# Patient Record
Sex: FEMALE | Race: BLACK OR AFRICAN AMERICAN | Employment: PART TIME | URBAN - METROPOLITAN AREA
[De-identification: names, ages, dates, MRNs, and addresses within clinical notes are randomized per-mention and may not be internally consistent; named-entity substitution may affect disease eponyms.]

---

## 2017-07-04 ENCOUNTER — HOSPITAL ENCOUNTER (EMERGENCY)
Age: 22
Discharge: HOME | End: 2017-07-04
Attending: EMERGENCY MEDICINE | Admitting: EMERGENCY MEDICINE
Payer: SELF-PAY

## 2017-07-04 VITALS
WEIGHT: 102 LBS | DIASTOLIC BLOOD PRESSURE: 75 MMHG | TEMPERATURE: 98.9 F | OXYGEN SATURATION: 98 % | SYSTOLIC BLOOD PRESSURE: 107 MMHG | BODY MASS INDEX: 18.07 KG/M2 | RESPIRATION RATE: 16 BRPM | HEART RATE: 95 BPM | HEIGHT: 63 IN

## 2017-07-04 DIAGNOSIS — A59.9 TRICHIMONIASIS: ICD-10-CM

## 2017-07-04 DIAGNOSIS — B96.89 BV (BACTERIAL VAGINOSIS): ICD-10-CM

## 2017-07-04 DIAGNOSIS — N76.0 BV (BACTERIAL VAGINOSIS): ICD-10-CM

## 2017-07-04 DIAGNOSIS — N30.00 ACUTE CYSTITIS WITHOUT HEMATURIA: ICD-10-CM

## 2017-07-04 DIAGNOSIS — K29.70 GASTRITIS WITHOUT BLEEDING, UNSPECIFIED CHRONICITY, UNSPECIFIED GASTRITIS TYPE: Primary | ICD-10-CM

## 2017-07-04 LAB
ALBUMIN SERPL BCP-MCNC: 3.7 G/DL (ref 3.5–5)
ALBUMIN/GLOB SERPL: 0.9 {RATIO} (ref 1.1–2.2)
ALP SERPL-CCNC: 85 U/L (ref 45–117)
ALT SERPL-CCNC: 16 U/L (ref 12–78)
ANION GAP BLD CALC-SCNC: 10 MMOL/L (ref 5–15)
APPEARANCE UR: ABNORMAL
AST SERPL W P-5'-P-CCNC: 17 U/L (ref 15–37)
BACTERIA URNS QL MICRO: ABNORMAL /HPF
BASOPHILS # BLD AUTO: 0 K/UL (ref 0–0.1)
BASOPHILS # BLD: 0 % (ref 0–1)
BILIRUB SERPL-MCNC: 0.6 MG/DL (ref 0.2–1)
BILIRUB UR QL CFM: NEGATIVE
BUN SERPL-MCNC: 8 MG/DL (ref 6–20)
BUN/CREAT SERPL: 11 (ref 12–20)
CALCIUM SERPL-MCNC: 8.5 MG/DL (ref 8.5–10.1)
CHLORIDE SERPL-SCNC: 102 MMOL/L (ref 97–108)
CLUE CELLS VAG QL WET PREP: NORMAL
CO2 SERPL-SCNC: 28 MMOL/L (ref 21–32)
COLOR UR: ABNORMAL
CREAT SERPL-MCNC: 0.73 MG/DL (ref 0.55–1.02)
EOSINOPHIL # BLD: 0.1 K/UL (ref 0–0.4)
EOSINOPHIL NFR BLD: 1 % (ref 0–7)
EPITH CASTS URNS QL MICRO: ABNORMAL /LPF
ERYTHROCYTE [DISTWIDTH] IN BLOOD BY AUTOMATED COUNT: 12 % (ref 11.5–14.5)
GLOBULIN SER CALC-MCNC: 4.3 G/DL (ref 2–4)
GLUCOSE SERPL-MCNC: 82 MG/DL (ref 65–100)
GLUCOSE UR STRIP.AUTO-MCNC: NEGATIVE MG/DL
HCG UR QL: NEGATIVE
HCT VFR BLD AUTO: 37.4 % (ref 35–47)
HGB BLD-MCNC: 12.7 G/DL (ref 11.5–16)
HGB UR QL STRIP: ABNORMAL
KETONES UR QL STRIP.AUTO: NEGATIVE MG/DL
KOH PREP SPEC: NORMAL
LACTATE SERPL-SCNC: 1 MMOL/L (ref 0.4–2)
LEUKOCYTE ESTERASE UR QL STRIP.AUTO: ABNORMAL
LIPASE SERPL-CCNC: 68 U/L (ref 73–393)
LYMPHOCYTES # BLD AUTO: 14 % (ref 12–49)
LYMPHOCYTES # BLD: 1.2 K/UL (ref 0.8–3.5)
MCH RBC QN AUTO: 29.6 PG (ref 26–34)
MCHC RBC AUTO-ENTMCNC: 34 G/DL (ref 30–36.5)
MCV RBC AUTO: 87.2 FL (ref 80–99)
MONOCYTES # BLD: 0.8 K/UL (ref 0–1)
MONOCYTES NFR BLD AUTO: 10 % (ref 5–13)
NEUTS SEG # BLD: 6.4 K/UL (ref 1.8–8)
NEUTS SEG NFR BLD AUTO: 75 % (ref 32–75)
NITRITE UR QL STRIP.AUTO: NEGATIVE
PH UR STRIP: 6 [PH] (ref 5–8)
PLATELET # BLD AUTO: 205 K/UL (ref 150–400)
POTASSIUM SERPL-SCNC: 3.8 MMOL/L (ref 3.5–5.1)
PROT SERPL-MCNC: 8 G/DL (ref 6.4–8.2)
PROT UR STRIP-MCNC: 100 MG/DL
RBC # BLD AUTO: 4.29 M/UL (ref 3.8–5.2)
RBC #/AREA URNS HPF: ABNORMAL /HPF (ref 0–5)
SERVICE CMNT-IMP: NORMAL
SODIUM SERPL-SCNC: 140 MMOL/L (ref 136–145)
SP GR UR REFRACTOMETRY: 1.02 (ref 1–1.03)
T VAGINALIS VAG QL WET PREP: NORMAL
UA: UC IF INDICATED,UAUC: ABNORMAL
UROBILINOGEN UR QL STRIP.AUTO: 1 EU/DL (ref 0.2–1)
WBC # BLD AUTO: 8.5 K/UL (ref 3.6–11)
WBC URNS QL MICRO: ABNORMAL /HPF (ref 0–4)

## 2017-07-04 PROCEDURE — 74011250637 HC RX REV CODE- 250/637: Performed by: PHYSICIAN ASSISTANT

## 2017-07-04 PROCEDURE — 81025 URINE PREGNANCY TEST: CPT

## 2017-07-04 PROCEDURE — 85025 COMPLETE CBC W/AUTO DIFF WBC: CPT | Performed by: PHYSICIAN ASSISTANT

## 2017-07-04 PROCEDURE — 87210 SMEAR WET MOUNT SALINE/INK: CPT | Performed by: PHYSICIAN ASSISTANT

## 2017-07-04 PROCEDURE — 80053 COMPREHEN METABOLIC PANEL: CPT | Performed by: PHYSICIAN ASSISTANT

## 2017-07-04 PROCEDURE — 87491 CHLMYD TRACH DNA AMP PROBE: CPT | Performed by: PHYSICIAN ASSISTANT

## 2017-07-04 PROCEDURE — 83690 ASSAY OF LIPASE: CPT | Performed by: PHYSICIAN ASSISTANT

## 2017-07-04 PROCEDURE — 81001 URINALYSIS AUTO W/SCOPE: CPT | Performed by: EMERGENCY MEDICINE

## 2017-07-04 PROCEDURE — 83605 ASSAY OF LACTIC ACID: CPT | Performed by: PHYSICIAN ASSISTANT

## 2017-07-04 PROCEDURE — 87147 CULTURE TYPE IMMUNOLOGIC: CPT | Performed by: EMERGENCY MEDICINE

## 2017-07-04 PROCEDURE — 87086 URINE CULTURE/COLONY COUNT: CPT | Performed by: EMERGENCY MEDICINE

## 2017-07-04 PROCEDURE — 96360 HYDRATION IV INFUSION INIT: CPT

## 2017-07-04 PROCEDURE — 99283 EMERGENCY DEPT VISIT LOW MDM: CPT

## 2017-07-04 PROCEDURE — 74011250636 HC RX REV CODE- 250/636: Performed by: PHYSICIAN ASSISTANT

## 2017-07-04 PROCEDURE — 36415 COLL VENOUS BLD VENIPUNCTURE: CPT | Performed by: PHYSICIAN ASSISTANT

## 2017-07-04 RX ORDER — METRONIDAZOLE 500 MG/1
500 TABLET ORAL 2 TIMES DAILY
Qty: 1414 TAB | Refills: 0 | Status: SHIPPED | OUTPATIENT
Start: 2017-07-04 | End: 2017-07-11

## 2017-07-04 RX ORDER — NITROFURANTOIN 25; 75 MG/1; MG/1
100 CAPSULE ORAL 2 TIMES DAILY
Qty: 10 CAP | Refills: 0 | Status: SHIPPED | OUTPATIENT
Start: 2017-07-04 | End: 2017-07-09

## 2017-07-04 RX ORDER — SODIUM CHLORIDE 0.9 % (FLUSH) 0.9 %
5-10 SYRINGE (ML) INJECTION AS NEEDED
Status: DISCONTINUED | OUTPATIENT
Start: 2017-07-04 | End: 2017-07-04 | Stop reason: HOSPADM

## 2017-07-04 RX ORDER — AZITHROMYCIN 250 MG/1
2000 TABLET, FILM COATED ORAL
Status: COMPLETED | OUTPATIENT
Start: 2017-07-04 | End: 2017-07-04

## 2017-07-04 RX ORDER — SODIUM CHLORIDE 0.9 % (FLUSH) 0.9 %
5-10 SYRINGE (ML) INJECTION EVERY 8 HOURS
Status: DISCONTINUED | OUTPATIENT
Start: 2017-07-04 | End: 2017-07-04 | Stop reason: HOSPADM

## 2017-07-04 RX ADMIN — AZITHROMYCIN 2000 MG: 250 TABLET, FILM COATED ORAL at 19:06

## 2017-07-04 RX ADMIN — SODIUM CHLORIDE 1000 ML: 900 INJECTION, SOLUTION INTRAVENOUS at 19:09

## 2017-07-04 NOTE — DISCHARGE INSTRUCTIONS
Gastritis: Care Instructions  Your Care Instructions    Gastritis is a sore and upset stomach. It happens when something irritates the stomach lining. Many things can cause it. These include an infection such as the flu or something you ate or drank. Medicines or a sore on the lining of the stomach (ulcer) also can cause it. Your belly may bloat and ache. You may belch, vomit, and feel sick to your stomach. You should be able to relieve the problem by taking medicine. And it may help to change your diet. If gastritis lasts, your doctor may prescribe medicine. Follow-up care is a key part of your treatment and safety. Be sure to make and go to all appointments, and call your doctor if you are having problems. It's also a good idea to know your test results and keep a list of the medicines you take. How can you care for yourself at home? · If your doctor prescribed antibiotics, take them as directed. Do not stop taking them just because you feel better. You need to take the full course of antibiotics. · Be safe with medicines. If your doctor prescribed medicine to decrease stomach acid, take it as directed. Call your doctor if you think you are having a problem with your medicine. · Do not take any other medicine, including over-the-counter pain relievers, without talking to your doctor first.  · If your doctor recommends over-the-counter medicine to reduce stomach acid, such as Pepcid AC, Prilosec, Tagamet HB, or Zantac 75, follow the directions on the label. · Drink plenty of fluids (enough so that your urine is light yellow or clear like water) to prevent dehydration. Choose water and other caffeine-free clear liquids. If you have kidney, heart, or liver disease and have to limit fluids, talk with your doctor before you increase the amount of fluids you drink. · Limit how much alcohol you drink. · Avoid coffee, tea, cola drinks, chocolate, and other foods with caffeine.  They increase stomach acid.  When should you call for help? Call 911 anytime you think you may need emergency care. For example, call if:  · You vomit blood or what looks like coffee grounds. · You pass maroon or very bloody stools. Call your doctor now or seek immediate medical care if:  · You start breathing fast and have not produced urine in the last 8 hours. · You cannot keep fluids down. Watch closely for changes in your health, and be sure to contact your doctor if:  · You do not get better as expected. Where can you learn more? Go to http://sandy-asmita.info/. Enter [id number] in the search box to learn more about \"Gastritis: Care Instructions. \"  Current as of: August 9, 2016  Content Version: 11.3  © 2999-5653 [employer]. Care instructions adapted under license by [employer] (which disclaims liability or warranty for this information). If you have questions about a medical condition or this instruction, always ask your healthcare professional. [place] [address] any warranty or liability for your use of this information. Bacterial Vaginosis: Care Instructions  Your Care Instructions    Bacterial vaginosis is a type of vaginal infection. It is caused by excess growth of certain bacteria that are normally found in the vagina. Symptoms can include itching, swelling, pain when you urinate or have sex, and a gray or yellow discharge with a \"fishy\" odor. It is not considered an infection that is spread through sexual contact. Although symptoms can be annoying and uncomfortable, bacterial vaginosis does not usually cause other health problems. However, if you have it while you are pregnant, it can cause complications. While the infection may go away on its own, most doctors use antibiotics to treat it. You may have been prescribed pills or vaginal cream. With treatment, bacterial vaginosis usually clears up in 5 to 7 days.   Follow-up care is a key part of your treatment and safety. Be sure to make and go to all appointments, and call your doctor if you are having problems. It's also a good idea to know your test results and keep a list of the medicines you take. How can you care for yourself at home? · Take your antibiotics as directed. Do not stop taking them just because you feel better. You need to take the full course of antibiotics. · Do not eat or drink anything that contains alcohol if you are taking metronidazole (Flagyl). · Keep using your medicine if you start your period. Use pads instead of tampons while using a vaginal cream or suppository. Tampons can absorb the medicine. · Wear loose cotton clothing. Do not wear nylon and other materials that hold body heat and moisture close to the skin. · Do not scratch. Relieve itching with a cold pack or a cool bath. · Do not wash your vaginal area more than once a day. Use plain water or a mild, unscented soap. Do not douche. When should you call for help? Watch closely for changes in your health, and be sure to contact your doctor if:  · You have unexpected vaginal bleeding. · You have a fever. · You have new or increased pain in your vagina or pelvis. · You are not getting better after 1 week. · Your symptoms return after you finish the course of your medicine. Where can you learn more? Go to http://sandy-asmita.info/. Tammi Brittle in the search box to learn more about \"Bacterial Vaginosis: Care Instructions. \"  Current as of: October 13, 2016  Content Version: 11.3  © 0105-3322 [employer]. Care instructions adapted under license by [employer] (which disclaims liability or warranty for this information). If you have questions about a medical condition or this instruction, always ask your healthcare professional. [place] [address] any warranty or liability for your use of this information.          Trichomoniasis: Care Instructions  Your Care Instructions  Trichomoniasis is a sexually transmitted infection (STI) that is spread by having sex with an infected partner. Trichomoniasis is commonly called trich (say \"trick\"). In women, trich may cause vaginal itching and a smelly discharge. But in many cases, especially in men, there are no symptoms. [place] is treated so that you do not spread it to others. Both you and your sex partner or partners should be treated at the same time so you do not infect each other again. Trich may cause problems with pregnancy. Your doctor will talk with you about treatment for Trich if you are pregnant. Follow-up care is a key part of your treatment and safety. Be sure to make and go to all appointments, and call your doctor if you are having problems. Its also a good idea to know your test results and keep a list of the medicines you take. How can you care for yourself at home? · Take your antibiotics as directed. Do not stop taking them just because you feel better. You need to take the full course of antibiotics. · Do not have sex while you are being treated. If your doctor gave you a single dose of antibiotics, do not have sex for one week after being treated and until your partner also has been treated. · Tell your sex partner (or partners) that he or she will also need to be tested and treated. · Use a cold water compress or cool baths to relieve itching. To prevent trichomoniasis in the future  · Use latex condoms every time you have sex. Use them from the beginning to the end of sexual contact. · Talk to your partner before having sex. Find out if he or she has or is at risk for trich or any other STI. Keep in mind that a person may be able to spread an STI even if he or she does not have symptoms. · Do not have sex if you are being treated for trich or any other STI. · Do not have sex with anyone who has symptoms of an STI, such as sores on the genitals or mouth.   · Having one sex partner (who does not have STIs and does not have sex with anyone else) is a good way to avoid STIs. When should you call for help? Call your doctor now or seek immediate medical care if:  · You have unusual vaginal bleeding. · You have a fever. · You have new discharge from the vagina or penis. · You have pelvic pain. Watch closely for changes in your health, and be sure to contact your doctor if:  · You do not get better as expected. · You have any new symptoms or your symptoms get worse. Where can you learn more? Go to http://sandy-asmita.info/. Enter V077 in the search box to learn more about \"Trichomoniasis: Care Instructions. \"  Current as of: March 20, 2017  Content Version: 11.3  © 6755-0984 [employer]. Care instructions adapted under license by [employer] (which disclaims liability or warranty for this information). If you have questions about a medical condition or this instruction, always ask your healthcare professional. [address] any warranty or liability for your use of this information.

## 2017-07-04 NOTE — ED PROVIDER NOTES
HPI Comments: Pt reports sharp epigastric pain that began today after eaing chicken. Pt reports others ate food. No one had same reaction. Reports nausea, no emesis. Reports BM shortly after pain. Denies blood or loose stool. Reports decreased appetite today. Denies fever/chills, urinary sx, change in vaginal discharge. Pt admits to unprotected intercourse recently, and she is concerned for STDs. Would like to be tested and treated. Patient is a 25 y.o. female presenting with abdominal pain. The history is provided by the patient. Abdominal Pain    This is a new problem. The pain is located in the epigastric region. Associated symptoms include anorexia, flatus and nausea. Pertinent negatives include no fever, no belching, no diarrhea, no hematochezia, no melena, no vomiting, no constipation, no dysuria, no frequency, no hematuria, no headaches, no arthralgias, no myalgias, no trauma, no chest pain and no back pain. Nothing worsens the pain. The pain is relieved by nothing. Past Medical History:   Diagnosis Date    Asthma     Second hand smoke exposure     Seizure [facility])     mother unsure what kind of seizure, last one was in 1st grade       History reviewed. No pertinent surgical history. History reviewed. No pertinent family history. Social History     Social History    Marital status: SINGLE     Spouse name: N/A    Number of children: N/A    Years of education: N/A     Occupational History    Not on file. Social History Main Topics    Smoking status: Former Smoker     Packs/day: 0.25    Smokeless tobacco: Never Used    Alcohol use No    Drug use: No    Sexual activity: Yes     Birth control/ protection: Condom     Other Topics Concern    Not on file     Social History Narrative         ALLERGIES: Amoxicillin    Review of Systems   Constitutional: Positive for appetite change. Negative for activity change, chills and fever.    Eyes: Negative for photophobia and visual disturbance. Respiratory: Positive for cough. Negative for chest tightness, wheezing and stridor. Cardiovascular: Negative for chest pain. Gastrointestinal: Positive for abdominal pain, anorexia, flatus and nausea. Negative for abdominal distention, blood in stool, constipation, diarrhea, hematochezia, melena, rectal pain and vomiting. Genitourinary: Negative for dysuria, flank pain, frequency and hematuria. Musculoskeletal: Negative for arthralgias, back pain and myalgias. Skin: Negative for color change, pallor, rash and wound. Neurological: Negative for dizziness, weakness, light-headedness and headaches. All other systems reviewed and are negative. Vitals:    07/04/17 1819   BP: 107/75   Pulse: 95   Resp: 16   Temp: 98.9 °F (37.2 °C)   SpO2: 98%   Weight: 46.3 kg (102 lb)   Height: 5' 3\" (1.6 m)            Physical Exam   Constitutional: She is oriented to person, place, and time. She appears well-developed and well-nourished. No distress. HENT:   Head: Normocephalic and atraumatic. Eyes: Conjunctivae are normal.   Cardiovascular: Normal rate, regular rhythm and normal heart sounds. Pulmonary/Chest: Effort normal and breath sounds normal. No respiratory distress. Abdominal: Soft. Bowel sounds are normal. She exhibits no distension. Genitourinary: No labial fusion. There is no rash, tenderness, lesion or injury on the right labia. There is no rash, tenderness, lesion or injury on the left labia. Uterus is not tender. Cervix exhibits discharge. Cervix exhibits no motion tenderness and no friability. Right adnexum displays no tenderness. Left adnexum displays no tenderness. No erythema, tenderness or bleeding in the vagina. No foreign body in the vagina. No signs of injury around the vagina. Vaginal discharge (frothy white discharge, no odor) found. Genitourinary Comments: Cervical os closed   Musculoskeletal: Normal range of motion.    Neurological: She is alert and oriented to person, place, and time. Skin: Skin is warm. No rash noted. No erythema. Psychiatric: She has a normal mood and affect. Her behavior is normal.   Nursing note and vitals reviewed. MDM  Number of Diagnoses or Management Options  Acute cystitis without hematuria:   BV (bacterial vaginosis):   Gastritis without bleeding, unspecified chronicity, unspecified gastritis type:   Trichimoniasis:   Diagnosis management comments: DDx: UTI, Trich, BV, Yeast, Gonorrhea, Chlamydia, Gastritis, Gastroenteritis, Pancreatitis, Viral Illness, Food Borne Illness       Amount and/or Complexity of Data Reviewed  Clinical lab tests: ordered and reviewed  Tests in the radiology section of CPT®: ordered and reviewed      ED Course       Procedures           Discussed results with pt. Discussed that partner needs to be treated. All questions answered. Pt voiced she understood. Pt reports improvement of epigastric pain after fluids.          LABORATORY TESTS:  Recent Results (from the past 12 hour(s))   URINALYSIS W/ REFLEX CULTURE    Collection Time: 07/04/17  6:37 PM   Result Value Ref Range    Color DARK YELLOW      Appearance HAZY (A) CLEAR      Specific gravity 1.025 1.003 - 1.030      pH (UA) 6.0 5.0 - 8.0      Protein 100 (A) NEG mg/dL    Glucose NEGATIVE  NEG mg/dL    Ketone NEGATIVE  NEG mg/dL    Blood TRACE (A) NEG      Urobilinogen 1.0 0.2 - 1.0 EU/dL    Nitrites NEGATIVE  NEG      Leukocyte Esterase SMALL (A) NEG      Bilirubin UA, confirm NEGATIVE  NEG      WBC 5-10 0 - 4 /hpf    RBC 5-10 0 - 5 /hpf    Epithelial cells MODERATE (A) FEW /lpf    Bacteria 2+ (A) NEG /hpf    UA:UC IF INDICATED URINE CULTURE ORDERED (A) CNI     HCG URINE, QL. - POC    Collection Time: 07/04/17  6:38 PM   Result Value Ref Range    Pregnancy test,urine (POC) NEGATIVE  NEG     CBC WITH AUTOMATED DIFF    Collection Time: 07/04/17  6:53 PM   Result Value Ref Range    WBC 8.5 3.6 - 11.0 K/uL    RBC 4.29 3.80 - 5.20 M/uL    HGB 12.7 11.5 - 16.0 g/dL HCT 37.4 35.0 - 47.0 %    MCV 87.2 80.0 - 99.0 FL    MCH 29.6 26.0 - 34.0 PG    MCHC 34.0 30.0 - 36.5 g/dL    RDW 12.0 11.5 - 14.5 %    PLATELET 346 825 - 919 K/uL    NEUTROPHILS 75 32 - 75 %    LYMPHOCYTES 14 12 - 49 %    MONOCYTES 10 5 - 13 %    EOSINOPHILS 1 0 - 7 %    BASOPHILS 0 0 - 1 %    ABS. NEUTROPHILS 6.4 1.8 - 8.0 K/UL    ABS. LYMPHOCYTES 1.2 0.8 - 3.5 K/UL    ABS. MONOCYTES 0.8 0.0 - 1.0 K/UL    ABS. EOSINOPHILS 0.1 0.0 - 0.4 K/UL    ABS. BASOPHILS 0.0 0.0 - 0.1 K/UL   METABOLIC PANEL, COMPREHENSIVE    Collection Time: 07/04/17  6:53 PM   Result Value Ref Range    Sodium 140 136 - 145 mmol/L    Potassium 3.8 3.5 - 5.1 mmol/L    Chloride 102 97 - 108 mmol/L    CO2 28 21 - 32 mmol/L    Anion gap 10 5 - 15 mmol/L    Glucose 82 65 - 100 mg/dL    BUN 8 6 - 20 MG/DL    Creatinine 0.73 0.55 - 1.02 MG/DL    BUN/Creatinine ratio 11 (L) 12 - 20      GFR est AA >60 >60 ml/min/1.73m2    GFR est non-AA >60 >60 ml/min/1.73m2    Calcium 8.5 8.5 - 10.1 MG/DL    Bilirubin, total 0.6 0.2 - 1.0 MG/DL    ALT (SGPT) 16 12 - 78 U/L    AST (SGOT) 17 15 - 37 U/L    Alk.  phosphatase 85 45 - 117 U/L    Protein, total 8.0 6.4 - 8.2 g/dL    Albumin 3.7 3.5 - 5.0 g/dL    Globulin 4.3 (H) 2.0 - 4.0 g/dL    A-G Ratio 0.9 (L) 1.1 - 2.2     LIPASE    Collection Time: 07/04/17  6:53 PM   Result Value Ref Range    Lipase 68 (L) 73 - 393 U/L   LACTIC ACID    Collection Time: 07/04/17  6:53 PM   Result Value Ref Range    Lactic acid 1.0 0.4 - 2.0 MMOL/L   RODY, OTHER SOURCES    Collection Time: 07/04/17  6:53 PM   Result Value Ref Range    Special Requests: NO SPECIAL REQUESTS      KOH NO YEAST SEEN     WET PREP    Collection Time: 07/04/17  6:53 PM   Result Value Ref Range    Clue cells CLUE CELLS PRESENT      Wet prep TRICHOMONAS PRESENT         IMAGING RESULTS:  No orders to display       MEDICATIONS GIVEN:  Medications   sodium chloride 0.9 % bolus infusion 1,000 mL (0 mL IntraVENous IV Completed 7/4/17 2007)   sodium chloride (NS) flush 5-10 mL (not administered)   sodium chloride (NS) flush 5-10 mL (not administered)   azithromycin (ZITHROMAX) tablet 2,000 mg (2,000 mg Oral Given 7/4/17 1902)       IMPRESSION:  1. Gastritis without bleeding, unspecified chronicity, unspecified gastritis type    2. Acute cystitis without hematuria    3. BV (bacterial vaginosis)    4. Trichimoniasis        PLAN:  1. Discharge Medication List as of 7/4/2017  7:48 PM      START taking these medications    Details   metroNIDAZOLE (FLAGYL) 500 mg tablet Take 1 Tab by mouth two (2) times a day for 7 days. , Normal, Disp-1414 Tab, R-0      nitrofurantoin, macrocrystal-monohydrate, (MACROBID) 100 mg capsule Take 1 Cap by mouth two (2) times a day for 5 days. , Normal, Disp-10 Cap, R-0           2.    Follow-up Information     Follow up With Details Comments 2001 for[MD] [facility],[address] [facility] Schedule an appointment as soon as possible for a visit in 2 days for PCP follow up [address] [facility] [address] [facility] [address]    Erwin Rucker NP Schedule an appointment as soon as possible for a visit in 1 day for gyn follow up [address] [facility]  [phone]          Return to ED if worse

## 2017-07-04 NOTE — LETTER
7/6/2017 Lee Gutiérrez [address] [id number] Dear Ms. Wenceslao Villanueva You were seen in the Emergency Department of [address] [place] [address] on 7/4/2017 and had lab and/or radiology tests performed. The Urine culture from your Emergency Department visit on 7/4/2017 was positive. If you have not improved or are worsening, please follow up with your primary care doctor or Emergency department as soon as possible. Your antibiotic may need to be changed. If you have any questions please contact the Emergency Department at [phone]. Sincerely, Laura Strauss PA-C 
 
[facility] EMERGENCY DEPT 
[address] [place] [phone] [phone]

## 2017-07-04 NOTE — ED TRIAGE NOTES
Intermittent sharp abd pain starting today with decreased appetite x years, denies n/v/diarrhea  Prod cough with yellow sputum and nasal congestion x 1 week or so

## 2017-07-05 LAB
C TRACH DNA SPEC QL NAA+PROBE: NEGATIVE
N GONORRHOEA DNA SPEC QL NAA+PROBE: NEGATIVE
SAMPLE TYPE: NORMAL
SERVICE CMNT-IMP: NORMAL
SPECIMEN SOURCE: NORMAL

## 2017-07-06 LAB
BACTERIA SPEC CULT: ABNORMAL
BACTERIA SPEC CULT: ABNORMAL
CC UR VC: ABNORMAL
SERVICE CMNT-IMP: ABNORMAL

## 2017-09-13 ENCOUNTER — HOSPITAL ENCOUNTER (EMERGENCY)
Age: 22
Discharge: HOME | End: 2017-09-13
Attending: EMERGENCY MEDICINE
Payer: SELF-PAY

## 2017-09-13 VITALS
HEART RATE: 76 BPM | DIASTOLIC BLOOD PRESSURE: 81 MMHG | SYSTOLIC BLOOD PRESSURE: 124 MMHG | OXYGEN SATURATION: 99 % | TEMPERATURE: 98.4 F | RESPIRATION RATE: 20 BRPM

## 2017-09-13 DIAGNOSIS — L03.116 CELLULITIS OF LEFT LOWER EXTREMITY: Primary | ICD-10-CM

## 2017-09-13 DIAGNOSIS — L02.91 ABSCESS: ICD-10-CM

## 2017-09-13 PROCEDURE — 74011250637 HC RX REV CODE- 250/637: Performed by: EMERGENCY MEDICINE

## 2017-09-13 PROCEDURE — 99282 EMERGENCY DEPT VISIT SF MDM: CPT

## 2017-09-13 RX ORDER — IBUPROFEN 400 MG/1
800 TABLET ORAL
Status: COMPLETED | OUTPATIENT
Start: 2017-09-13 | End: 2017-09-13

## 2017-09-13 RX ORDER — SULFAMETHOXAZOLE AND TRIMETHOPRIM 800; 160 MG/1; MG/1
2 TABLET ORAL 2 TIMES DAILY
Qty: 40 TAB | Refills: 0 | Status: SHIPPED | OUTPATIENT
Start: 2017-09-13 | End: 2017-09-23

## 2017-09-13 RX ORDER — CEPHALEXIN 250 MG/1
500 CAPSULE ORAL
Status: COMPLETED | OUTPATIENT
Start: 2017-09-13 | End: 2017-09-13

## 2017-09-13 RX ORDER — CEPHALEXIN 500 MG/1
500 CAPSULE ORAL 4 TIMES DAILY
Qty: 40 CAP | Refills: 0 | Status: SHIPPED | OUTPATIENT
Start: 2017-09-13 | End: 2017-09-23

## 2017-09-13 RX ORDER — SULFAMETHOXAZOLE AND TRIMETHOPRIM 800; 160 MG/1; MG/1
2 TABLET ORAL
Status: COMPLETED | OUTPATIENT
Start: 2017-09-13 | End: 2017-09-13

## 2017-09-13 RX ORDER — IBUPROFEN 800 MG/1
800 TABLET ORAL
Qty: 1 TAB | Refills: 0 | Status: SHIPPED | OUTPATIENT
Start: 2017-09-13 | End: 2018-04-20

## 2017-09-13 RX ADMIN — SULFAMETHOXAZOLE AND TRIMETHOPRIM 2 TABLET: 800; 160 TABLET ORAL at 01:00

## 2017-09-13 RX ADMIN — IBUPROFEN 800 MG: 400 TABLET, FILM COATED ORAL at 01:00

## 2017-09-13 RX ADMIN — CEPHALEXIN 500 MG: 250 CAPSULE ORAL at 01:00

## 2017-09-13 NOTE — ED NOTES
Discharge Instructions Reviewed with patient per this nurse. Discharge instructions given to patient per this nurse. Patient able to return verbalize discharge instructions. Paper copy of discharge instructions given. 3 RX given to patient per this ann. Patient condition stable, Respiratory status WNL, Neurostatus intact.  Ambultory out of er, to home with family

## 2017-09-13 NOTE — ED PROVIDER NOTES
HPI Comments: 20yo female presents with possible bug bite x several days which has become increasingly swollen and painful with an increasing area of surrounding erythema. Denies drainage, fever, injury to area or history of same. Patient is a 25 y.o. female presenting with skin problem. The history is provided by the patient. Skin Problem    This is a new problem. The current episode started more than 2 days ago. The problem has been gradually worsening. The problem is associated with an unknown factor. There has been no fever. The rash is present on the left upper leg. The pain is mild. The pain has been constant since onset. Treatments tried: warm compress. Past Medical History:   Diagnosis Date    Asthma     Second hand smoke exposure     Seizure [facility])     mother unsure what kind of seizure, last one was in 1st grade       No past surgical history on file. No family history on file. Social History     Social History    Marital status: SINGLE     Spouse name: N/A    Number of children: N/A    Years of education: N/A     Occupational History    Not on file. Social History Main Topics    Smoking status: Former Smoker     Packs/day: 0.25    Smokeless tobacco: Never Used    Alcohol use No    Drug use: No    Sexual activity: Yes     Birth control/ protection: Condom     Other Topics Concern    Not on file     Social History Narrative         ALLERGIES: Amoxicillin    Review of Systems   Constitutional: Negative. Negative for chills, fever and unexpected weight change. HENT: Negative. Negative for congestion and trouble swallowing. Eyes: Negative for discharge. Respiratory: Negative. Negative for cough, chest tightness and shortness of breath. Cardiovascular: Negative. Negative for chest pain. Gastrointestinal: Negative. Negative for abdominal distention, abdominal pain, constipation, diarrhea and nausea. Endocrine: Negative. Genitourinary: Negative.   Negative for difficulty urinating, dysuria, frequency and urgency. Musculoskeletal: Negative. Negative for arthralgias and myalgias. Skin: Positive for color change and rash. Wound: ?possible insect bit left proximal medial thigh. Allergic/Immunologic: Negative. Neurological: Negative. Negative for dizziness, speech difficulty and headaches. Hematological: Negative. Psychiatric/Behavioral: Negative. Negative for agitation and confusion. All other systems reviewed and are negative. Vitals:    09/13/17 0019   BP: 124/81   Pulse: 76   Resp: 20   Temp: 98.4 °F (36.9 °C)   SpO2: 99%            Physical Exam   Constitutional: She is oriented to person, place, and time. She appears well-developed and well-nourished. HENT:   Head: Normocephalic and atraumatic. Eyes: Conjunctivae and EOM are normal.   Neck: Neck supple. Cardiovascular: Normal rate, regular rhythm and intact distal pulses. Pulmonary/Chest: Effort normal. No respiratory distress. Abdominal: Soft. There is no tenderness. Musculoskeletal: Normal range of motion. She exhibits no deformity. Neurological: She is alert and oriented to person, place, and time. Skin: Skin is warm and dry. Abscess left inner thigh which is firm to palpation and exquisitely tender. No fluctuance, no active drainage. Surrounding erythema approximately 7cm in diameter. Psychiatric: She has a normal mood and affect. Her behavior is normal. Thought content normal.   Vitals reviewed. MDM  Number of Diagnoses or Management Options  Abscess:   Cellulitis of left lower extremity:     ED Course       Procedures    I recommended I&D, but patient was barely able tolerate palpation of area and had supreme anxiety about the procedure. She would like to try antibiotics, closely monitor the site, and return if she does not see significant improvement within several days.  Pt aware that infection could worsen or extend systemically without I&D but still refuses procedure.

## 2017-09-13 NOTE — DISCHARGE INSTRUCTIONS
Skin Abscess: Care Instructions  Your Care Instructions    A skin abscess is a bacterial infection that forms a pocket of pus. A boil is a kind of skin abscess. The doctor may have cut an opening in the abscess so that the pus can drain out. You may have gauze in the cut so that the abscess will stay open and keep draining. You may need antibiotics. You will need to follow up with your doctor to make sure the infection has gone away. The doctor has checked you carefully, but problems can develop later. If you notice any problems or new symptoms, get medical treatment right away. Follow-up care is a key part of your treatment and safety. Be sure to make and go to all appointments, and call your doctor if you are having problems. It's also a good idea to know your test results and keep a list of the medicines you take. How can you care for yourself at home? · Apply warm and dry compresses, a heating pad set on low, or a hot water bottle 3 or 4 times a day for pain. Keep a cloth between the heat source and your skin. · If your doctor prescribed antibiotics, take them as directed. Do not stop taking them just because you feel better. You need to take the full course of antibiotics. · Take pain medicines exactly as directed. ¨ If the doctor gave you a prescription medicine for pain, take it as prescribed. ¨ If you are not taking a prescription pain medicine, ask your doctor if you can take an over-the-counter medicine. · Keep your bandage clean and dry. Change the bandage whenever it gets wet or dirty, or at least one time a day. · If the abscess was packed with gauze:  ¨ Keep follow-up appointments to have the gauze changed or removed. If the doctor instructed you to remove the gauze, gently pull out all of the gauze when your doctor tells you to. ¨ After the gauze is removed, soak the area in warm water for 15 to 20 minutes 2 times a day, until the wound closes. When should you call for help?   Call your doctor now or seek immediate medical care if:  · You have signs of worsening infection, such as:  ¨ Increased pain, swelling, warmth, or redness. ¨ Red streaks leading from the infected skin. ¨ Pus draining from the wound. ¨ A fever. Watch closely for changes in your health, and be sure to contact your doctor if:  · You do not get better as expected. Where can you learn more? Go to http://sandy-asmita.info/. Enter Q721 in the search box to learn more about \"Skin Abscess: Care Instructions. \"  Current as of: October 13, 2016  Content Version: 11.3  © 5201-9918 [employer]. Care instructions adapted under license by [employer] (which disclaims liability or warranty for this information). If you have questions about a medical condition or this instruction, always ask your healthcare professional. [place] [address] any warranty or liability for your use of this information. Cellulitis: Care Instructions  Your Care Instructions    Cellulitis is a skin infection. It often occurs after a break in the skin from a scrape, cut, bite, or puncture, or after a rash. The doctor has checked you carefully, but problems can develop later. If you notice any problems or new symptoms, get medical treatment right away. Follow-up care is a key part of your treatment and safety. Be sure to make and go to all appointments, and call your doctor if you are having problems. It's also a good idea to know your test results and keep a list of the medicines you take. How can you care for yourself at home? · Take your antibiotics as directed. Do not stop taking them just because you feel better. You need to take the full course of antibiotics. · Prop up the infected area on pillows to reduce pain and swelling. Try to keep the area above the level of your heart as often as you can.   · If your doctor told you how to care for your wound, follow your doctor's instructions. If you did not get instructions, follow this general advice:  ¨ Wash the wound with clean water 2 times a day. Don't use hydrogen peroxide or alcohol, which can slow healing. ¨ You may cover the wound with a thin layer of petroleum jelly, such as Vaseline, and a nonstick bandage. ¨ Apply more petroleum jelly and replace the bandage as needed. · Be safe with medicines. Take pain medicines exactly as directed. ¨ If the doctor gave you a prescription medicine for pain, take it as prescribed. ¨ If you are not taking a prescription pain medicine, ask your doctor if you can take an over-the-counter medicine. To prevent cellulitis in the future  · Try to prevent cuts, scrapes, or other injuries to your skin. Cellulitis most often occurs where there is a break in the skin. · If you get a scrape, cut, mild burn, or bite, wash the wound with clean water as soon as you can to help avoid infection. Don't use hydrogen peroxide or alcohol, which can slow healing. · If you have swelling in your legs (edema), support stockings and good skin care may help prevent leg sores and cellulitis. · Take care of your feet, especially if you have diabetes or other conditions that increase the risk of infection. Wear shoes and socks. Do not go barefoot. If you have athlete's foot or other skin problems on your feet, talk to your doctor about how to treat them. When should you call for help? Call your doctor now or seek immediate medical care if:  · You have signs that your infection is getting worse, such as:  ¨ Increased pain, swelling, warmth, or redness. ¨ Red streaks leading from the area. ¨ Pus draining from the area. ¨ A fever. · You get a rash. Watch closely for changes in your health, and be sure to contact your doctor if:  · You are not getting better after 1 day (24 hours). · You do not get better as expected. Where can you learn more? Go to http://vladislav.info/.   Abby Ruvalcaba in the search box to learn more about \"Cellulitis: Care Instructions. \"  Current as of: October 13, 2016  Content Version: 11.3  © 8542-4542 [employer]. Care instructions adapted under license by [employer] (which disclaims liability or warranty for this information). If you have questions about a medical condition or this instruction, always ask your healthcare professional. [place] [address] any warranty or liability for your use of this information.

## 2017-09-28 ENCOUNTER — HOSPITAL ENCOUNTER (EMERGENCY)
Age: 22
Discharge: HOME | End: 2017-09-28
Attending: INTERNAL MEDICINE
Payer: SELF-PAY

## 2017-09-28 VITALS
SYSTOLIC BLOOD PRESSURE: 145 MMHG | TEMPERATURE: 98.7 F | HEART RATE: 79 BPM | WEIGHT: 102 LBS | BODY MASS INDEX: 18.77 KG/M2 | OXYGEN SATURATION: 100 % | HEIGHT: 62 IN | DIASTOLIC BLOOD PRESSURE: 89 MMHG | RESPIRATION RATE: 18 BRPM

## 2017-09-28 DIAGNOSIS — A59.9 TRICHIMONIASIS: ICD-10-CM

## 2017-09-28 DIAGNOSIS — N30.00 ACUTE CYSTITIS WITHOUT HEMATURIA: Primary | ICD-10-CM

## 2017-09-28 LAB
APPEARANCE UR: ABNORMAL
BACTERIA URNS QL MICRO: ABNORMAL /HPF
BILIRUB UR QL: NEGATIVE
COLOR UR: ABNORMAL
EPITH CASTS URNS QL MICRO: ABNORMAL /LPF
GLUCOSE UR STRIP.AUTO-MCNC: NEGATIVE MG/DL
HCG UR QL: NEGATIVE
HGB UR QL STRIP: ABNORMAL
KETONES UR QL STRIP.AUTO: NEGATIVE MG/DL
LEUKOCYTE ESTERASE UR QL STRIP.AUTO: ABNORMAL
NITRITE UR QL STRIP.AUTO: NEGATIVE
PH UR STRIP: 6.5 [PH] (ref 5–8)
PROT UR STRIP-MCNC: 30 MG/DL
RBC #/AREA URNS HPF: ABNORMAL /HPF (ref 0–5)
SP GR UR REFRACTOMETRY: 1.02 (ref 1–1.03)
TRICHOMONAS UR QL MICRO: PRESENT
UA: UC IF INDICATED,UAUC: ABNORMAL
UROBILINOGEN UR QL STRIP.AUTO: 1 EU/DL (ref 0.2–1)
WBC URNS QL MICRO: ABNORMAL /HPF (ref 0–4)

## 2017-09-28 PROCEDURE — 87086 URINE CULTURE/COLONY COUNT: CPT | Performed by: EMERGENCY MEDICINE

## 2017-09-28 PROCEDURE — 99283 EMERGENCY DEPT VISIT LOW MDM: CPT

## 2017-09-28 PROCEDURE — 81025 URINE PREGNANCY TEST: CPT

## 2017-09-28 PROCEDURE — 87077 CULTURE AEROBIC IDENTIFY: CPT | Performed by: EMERGENCY MEDICINE

## 2017-09-28 PROCEDURE — 81001 URINALYSIS AUTO W/SCOPE: CPT | Performed by: EMERGENCY MEDICINE

## 2017-09-28 RX ORDER — METRONIDAZOLE 500 MG/1
2000 TABLET ORAL
Qty: 4 TAB | Refills: 0 | Status: SHIPPED | OUTPATIENT
Start: 2017-09-28 | End: 2017-09-28

## 2017-09-28 RX ORDER — NITROFURANTOIN 25; 75 MG/1; MG/1
100 CAPSULE ORAL 2 TIMES DAILY
Qty: 10 CAP | Refills: 0 | Status: SHIPPED | OUTPATIENT
Start: 2017-09-28 | End: 2017-10-03

## 2017-09-28 RX ORDER — METRONIDAZOLE 500 MG/1
500 TABLET ORAL 2 TIMES DAILY
Qty: 14 TAB | Refills: 0 | Status: SHIPPED | OUTPATIENT
Start: 2017-09-28 | End: 2017-09-28

## 2017-09-29 NOTE — ED NOTES
NP Lisa Letters discharged pt and provided pt w/ after care instructions, no pain reassessment given to RN.

## 2017-09-29 NOTE — DISCHARGE INSTRUCTIONS
Urinary Tract Infection in Women: Care Instructions  Your Care Instructions    A urinary tract infection, or UTI, is a general term for an infection anywhere between the kidneys and the urethra (where urine comes out). Most UTIs are bladder infections. They often cause pain or burning when you urinate. UTIs are caused by bacteria and can be cured with antibiotics. Be sure to complete your treatment so that the infection goes away. Follow-up care is a key part of your treatment and safety. Be sure to make and go to all appointments, and call your doctor if you are having problems. It's also a good idea to know your test results and keep a list of the medicines you take. How can you care for yourself at home? · Take your antibiotics as directed. Do not stop taking them just because you feel better. You need to take the full course of antibiotics. · Drink extra water and other fluids for the next day or two. This may help wash out the bacteria that are causing the infection. (If you have kidney, heart, or liver disease and have to limit fluids, talk with your doctor before you increase your fluid intake.)  · Avoid drinks that are carbonated or have caffeine. They can irritate the bladder. · Urinate often. Try to empty your bladder each time. · To relieve pain, take a hot bath or lay a heating pad set on low over your lower belly or genital area. Never go to sleep with a heating pad in place. To prevent UTIs  · Drink plenty of water each day. This helps you urinate often, which clears bacteria from your system. (If you have kidney, heart, or liver disease and have to limit fluids, talk with your doctor before you increase your fluid intake.)  · Urinate when you need to. · Urinate right after you have sex. · Change sanitary pads often. · Avoid douches, bubble baths, feminine hygiene sprays, and other feminine hygiene products that have deodorants.   · After going to the bathroom, wipe from front to back.  When should you call for help? Call your doctor now or seek immediate medical care if:  · Symptoms such as fever, chills, nausea, or vomiting get worse or appear for the first time. · You have new pain in your back just below your rib cage. This is called flank pain. · There is new blood or pus in your urine. · You have any problems with your antibiotic medicine. Watch closely for changes in your health, and be sure to contact your doctor if:  · You are not getting better after taking an antibiotic for 2 days. · Your symptoms go away but then come back. Where can you learn more? Go to http://sandy-asmita.info/. Enter O127 in the search box to learn more about \"Urinary Tract Infection in Women: Care Instructions. \"  Current as of: November 28, 2016  Content Version: 11.3  © 5705-8111 [employer]. Care instructions adapted under license by [employer] (which disclaims liability or warranty for this information). If you have questions about a medical condition or this instruction, always ask your healthcare professional. [address] any warranty or liability for your use of this information. Trichomoniasis: Care Instructions  Your Care Instructions  Trichomoniasis is a sexually transmitted infection (STI) that is spread by having sex with an infected partner. Trichomoniasis is commonly called trich (say \"trick\"). In women, trich may cause vaginal itching and a smelly discharge. But in many cases, especially in men, there are no symptoms. Lafrances Mixer is treated so that you do not spread it to others. Both you and your sex partner or partners should be treated at the same time so you do not infect each other again. Trich may cause problems with pregnancy. Your doctor will talk with you about treatment for Trich if you are pregnant. Follow-up care is a key part of your treatment and safety.  Be sure to make and go to all appointments, and call your doctor if you are having problems. Its also a good idea to know your test results and keep a list of the medicines you take. How can you care for yourself at home? · Take your antibiotics as directed. Do not stop taking them just because you feel better. You need to take the full course of antibiotics. · Do not have sex while you are being treated. If your doctor gave you a single dose of antibiotics, do not have sex for one week after being treated and until your partner also has been treated. · Tell your sex partner (or partners) that he or she will also need to be tested and treated. · Use a cold water compress or cool baths to relieve itching. To prevent trichomoniasis in the future  · Use latex condoms every time you have sex. Use them from the beginning to the end of sexual contact. · Talk to your partner before having sex. Find out if he or she has or is at risk for trich or any other STI. Keep in mind that a person may be able to spread an STI even if he or she does not have symptoms. · Do not have sex if you are being treated for trich or any other STI. · Do not have sex with anyone who has symptoms of an STI, such as sores on the genitals or mouth. · Having one sex partner (who does not have STIs and does not have sex with anyone else) is a good way to avoid STIs. When should you call for help? Call your doctor now or seek immediate medical care if:  · You have unusual vaginal bleeding. · You have a fever. · You have new discharge from the vagina or penis. · You have pelvic pain. Watch closely for changes in your health, and be sure to contact your doctor if:  · You do not get better as expected. · You have any new symptoms or your symptoms get worse. Where can you learn more? Go to http://sandy-asmita.info/. Enter H937 in the search box to learn more about \"Trichomoniasis: Care Instructions. \"  Current as of: March 20, 2017  Content Version: 11.3  © 0331-5968 [employer]. Care instructions adapted under license by [employer] (which disclaims liability or warranty for this information). If you have questions about a medical condition or this instruction, always ask your healthcare professional. [place] [address] any warranty or liability for your use of this information.

## 2017-09-29 NOTE — ED NOTES
Pt in ED w/ complaint of dysuria w/ urinary frequency X 5 days. Pt denies vaginal discharge. Pt is A&O X 4 and appears in no distress. Emergency Department Nursing Plan of Care       The Nursing Plan of Care is developed from the Nursing assessment and Emergency Department Attending provider initial evaluation. The plan of care may be reviewed in the ED Provider note.     The Plan of Care was developed with the following considerations:   Patient / Family readiness to learn indicated by:verbalized understanding  Persons(s) to be included in education: patient  Barriers to Learning/Limitations:No    Signed     Jing De Guzman RN    9/28/2017   8:49 PM

## 2017-09-30 LAB
BACTERIA SPEC CULT: ABNORMAL
CC UR VC: ABNORMAL
SERVICE CMNT-IMP: ABNORMAL

## 2017-10-18 NOTE — ED PROVIDER NOTES
Patient is a 25 y.o. female presenting with urinary pain. The history is provided by the patient. Urinary Pain    This is a new problem. Episode onset: Pt reports urinary urgency x 5 days. Denies dysuria, hematuria, urinary frequency, fever/chills, abd pain, change in vaginal discharge. The pain is at a severity of 6/10. There has been no fever. She is not sexually active. Associated symptoms include urgency. Pertinent negatives include no chills, no sweats, no nausea, no vomiting, no discharge, no frequency, no hematuria, no hesitancy, no flank pain, no vaginal discharge, no abdominal pain and no back pain. She has tried nothing for the symptoms. Past Medical History:   Diagnosis Date    Asthma     Second hand smoke exposure     Seizure [facility])     mother unsure what kind of seizure, last one was in 1st grade       History reviewed. No pertinent surgical history. History reviewed. No pertinent family history. Social History     Social History    Marital status: SINGLE     Spouse name: N/A    Number of children: N/A    Years of education: N/A     Occupational History    Not on file. Social History Main Topics    Smoking status: Former Smoker     Packs/day: 0.25    Smokeless tobacco: Never Used    Alcohol use No    Drug use: No    Sexual activity: Yes     Birth control/ protection: Condom     Other Topics Concern    Not on file     Social History Narrative         ALLERGIES: Amoxicillin    Review of Systems   Constitutional: Negative for chills and fever. Eyes: Negative for photophobia and visual disturbance. Respiratory: Negative for chest tightness and shortness of breath. Gastrointestinal: Negative for abdominal pain, nausea and vomiting. Genitourinary: Positive for urgency. Negative for dyspareunia, dysuria, flank pain, frequency, hematuria, hesitancy, vaginal bleeding, vaginal discharge and vaginal pain. Musculoskeletal: Negative for back pain and myalgias.    Skin: Negative for color change, pallor, rash and wound. Neurological: Negative for dizziness, weakness and light-headedness. All other systems reviewed and are negative. Vitals:    09/28/17 1958   BP: 145/89   Pulse: 79   Resp: 18   Temp: 98.7 °F (37.1 °C)   SpO2: 100%   Weight: 46.3 kg (102 lb)   Height: 5' 2\" (1.575 m)            Physical Exam   Constitutional: She is oriented to person, place, and time. She appears well-developed and well-nourished. No distress. HENT:   Head: Normocephalic and atraumatic. Eyes: Conjunctivae are normal.   Cardiovascular: Normal rate, regular rhythm and normal heart sounds. Pulmonary/Chest: Effort normal and breath sounds normal. No respiratory distress. Abdominal: Soft. Bowel sounds are normal. She exhibits no distension. Musculoskeletal: Normal range of motion. Neurological: She is alert and oriented to person, place, and time. Skin: Skin is warm. No rash noted. No erythema. Psychiatric: She has a normal mood and affect. Her behavior is normal.   Nursing note and vitals reviewed. MDM  Number of Diagnoses or Management Options  Acute cystitis without hematuria:   Trichimoniasis:   Diagnosis management comments: DDx: UTI, Trich, Pyelo    ED Course       Procedures      LABORATORY TESTS:  No results found for this or any previous visit (from the past 12 hour(s)). IMAGING RESULTS:  No orders to display       MEDICATIONS GIVEN:  Medications - No data to display    IMPRESSION:  1. Acute cystitis without hematuria    2. Trichimoniasis        PLAN:  1. Discharge Medication List as of 9/28/2017  8:48 PM      START taking these medications    Details   nitrofurantoin, macrocrystal-monohydrate, (MACROBID) 100 mg capsule Take 1 Cap by mouth two (2) times a day for 5 days. , Normal, Disp-10 Cap, R-0      metroNIDAZOLE (FLAGYL) 500 mg tablet Take 4 Tabs by mouth now for 1 dose.  Take all 4 pills one time., Normal, Disp-4 Tab, R-0         CONTINUE these medications which have NOT CHANGED    Details   ibuprofen (MOTRIN) 800 mg tablet Take 1 Tab by mouth every eight (8) hours as needed for Pain for up to 30 doses. , Print, Disp-1 Tab, R-0           2.    Follow-up Information     Follow up With Details Comments Contact Info    [facility] Schedule an appointment as soon as possible for a visit in 2 days As needed [address]  [phone]        Return to ED if worse

## 2017-12-15 ENCOUNTER — HOSPITAL ENCOUNTER (EMERGENCY)
Age: 22
Discharge: HOME | End: 2017-12-15
Attending: EMERGENCY MEDICINE
Payer: SELF-PAY

## 2017-12-15 VITALS
BODY MASS INDEX: 17.72 KG/M2 | SYSTOLIC BLOOD PRESSURE: 122 MMHG | RESPIRATION RATE: 16 BRPM | DIASTOLIC BLOOD PRESSURE: 85 MMHG | HEART RATE: 67 BPM | OXYGEN SATURATION: 100 % | WEIGHT: 100 LBS | TEMPERATURE: 97.8 F | HEIGHT: 63 IN

## 2017-12-15 DIAGNOSIS — R11.11 NON-INTRACTABLE VOMITING WITHOUT NAUSEA, UNSPECIFIED VOMITING TYPE: Primary | ICD-10-CM

## 2017-12-15 LAB
APPEARANCE UR: ABNORMAL
BACTERIA URNS QL MICRO: ABNORMAL /HPF
BILIRUB UR QL: NEGATIVE
COLOR UR: ABNORMAL
EPITH CASTS URNS QL MICRO: ABNORMAL /LPF
GLUCOSE UR STRIP.AUTO-MCNC: NEGATIVE MG/DL
HCG UR QL: NEGATIVE
HGB UR QL STRIP: ABNORMAL
KETONES UR QL STRIP.AUTO: NEGATIVE MG/DL
LEUKOCYTE ESTERASE UR QL STRIP.AUTO: NEGATIVE
MUCOUS THREADS URNS QL MICRO: ABNORMAL /LPF
NITRITE UR QL STRIP.AUTO: NEGATIVE
PH UR STRIP: 7.5 [PH] (ref 5–8)
PROT UR STRIP-MCNC: 30 MG/DL
RBC #/AREA URNS HPF: ABNORMAL /HPF (ref 0–5)
SP GR UR REFRACTOMETRY: 1.02 (ref 1–1.03)
UA: UC IF INDICATED,UAUC: ABNORMAL
UROBILINOGEN UR QL STRIP.AUTO: 1 EU/DL (ref 0.2–1)
WBC URNS QL MICRO: ABNORMAL /HPF (ref 0–4)

## 2017-12-15 PROCEDURE — 81001 URINALYSIS AUTO W/SCOPE: CPT | Performed by: PHYSICIAN ASSISTANT

## 2017-12-15 PROCEDURE — 87086 URINE CULTURE/COLONY COUNT: CPT | Performed by: PHYSICIAN ASSISTANT

## 2017-12-15 PROCEDURE — 81025 URINE PREGNANCY TEST: CPT

## 2017-12-15 PROCEDURE — 99283 EMERGENCY DEPT VISIT LOW MDM: CPT

## 2017-12-15 NOTE — LETTER
[facility] EMERGENCY DEPT 
[address] [facility] [address] [id number] [phone] Work/School Note Date: 12/15/2017 To Whom It May concern: 
 
Kirstie Ramires was seen and treated today in the emergency room by the following provider(s): 
Physician Assistant: Denisha Pastrana PA-C. Kirstie Ramires may return to work on 12/16/2017. Sincerely, Denisha Pastrana PA-C

## 2017-12-16 NOTE — ED NOTES
Patient given copy of dc instructions. Patient verbalized understanding of instructions and script(s). Patient given a current medication reconciliation form and verbalized understanding of their medications. Patient verbalized understanding of the importance of discussing medications with  his or her physician or clinic when they follow up. Patient alert and oriented and in no acute distress. Pt verbalizes pain scale of 0 out of 10. Patient discharged home without assistance. Wheelchair was declined.

## 2017-12-16 NOTE — ED NOTES
Pt sts had abdominal pain and vomited X 1 at work today. Pt is asymptomatic at this time. Pt sts was sent home from work and advised that she needed clearance to return to work. Pt was eating when RN entered room. Pt denies fever, chills, body aches, congestion, cough. Emergency Department Nursing Plan of Care       The Nursing Plan of Care is developed from the Nursing assessment and Emergency Department Attending provider initial evaluation. The plan of care may be reviewed in the ED Provider note.     The Plan of Care was developed with the following considerations:   Patient / Family readiness to learn indicated by:verbalized understanding  Persons(s) to be included in education: patient  Barriers to Learning/Limitations:No    Signed     Joseph Strauss RN    12/15/2017   10:17 PM

## 2017-12-16 NOTE — ED PROVIDER NOTES
EMERGENCY DEPARTMENT HISTORY AND PHYSICAL EXAM      Date: 12/15/2017  Patient Name: Gricelda Camarena    History of Presenting Illness     Chief Complaint   Patient presents with    Letter for School/Work     reports vomited once at work today and her job made her come in       History Provided By: Patient    HPI: Gricelda Camarena, 25 y.o. female with PMHx significant for asthma, childhood seizures, and tobacco abuse, presents ambulatory to the ED with cc of 1 episode of emesis today at work and intermittent mild stomach cramping 3 x just pta. Pt denies nausea at this time and is tolerating po food. 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 Denies fever, chills, abd pain, dysuria, urgency, vaginal discharge, headache, cough, sob, wheezing. Endorses mild congestion. LMP unknown. Had Depo. No aggravating or relieving factors. PCP: None    There are no other complaints, changes, or physical findings at this time. Current Outpatient Prescriptions   Medication Sig Dispense Refill    ibuprofen (MOTRIN) 800 mg tablet Take 1 Tab by mouth every eight (8) hours as needed for Pain for up to 30 doses. 1 Tab 0       Past History     Past Medical History:  Past Medical History:   Diagnosis Date    Asthma     Second hand smoke exposure     Seizure [facility])     mother unsure what kind of seizure, last one was in 1st grade       Past Surgical History:  History reviewed. No pertinent surgical history. Family History:  History reviewed. No pertinent family history. Social History:  Social History   Substance Use Topics    Smoking status: Former Smoker     Packs/day: 0.25    Smokeless tobacco: Never Used    Alcohol use No       Allergies: Allergies   Allergen Reactions    Amoxicillin Hives         Review of Systems   Review of Systems   Constitutional: Negative for activity change, appetite change, chills, diaphoresis, fatigue and fever.    HENT: Positive for congestion. Negative for postnasal drip, rhinorrhea, sinus pain, sinus pressure, sore throat and voice change. Eyes: Negative. Respiratory: Negative. Negative for cough, chest tightness, shortness of breath and wheezing. Cardiovascular: Negative. Negative for chest pain. Gastrointestinal: Positive for vomiting. Negative for abdominal pain, constipation, diarrhea and nausea. Genitourinary: Positive for frequency. Negative for difficulty urinating, dysuria, flank pain, hematuria, pelvic pain, urgency, vaginal bleeding, vaginal discharge and vaginal pain. Musculoskeletal: Negative. Negative for back pain. Skin: Negative. Negative for rash. Neurological: Negative. Negative for headaches. Psychiatric/Behavioral: Negative. Physical Exam   Physical Exam   Constitutional: She is oriented to person, place, and time. She appears well-developed and well-nourished. No distress. Pt eating a sandwich in room in NAD. HENT:   Head: Normocephalic and atraumatic. Right Ear: Hearing, tympanic membrane, external ear and ear canal normal.   Left Ear: Hearing, tympanic membrane, external ear and ear canal normal.   Nose: Nose normal. No mucosal edema or rhinorrhea. Right sinus exhibits no maxillary sinus tenderness and no frontal sinus tenderness. Left sinus exhibits no maxillary sinus tenderness and no frontal sinus tenderness. Mouth/Throat: Uvula is midline, oropharynx is clear and moist and mucous membranes are normal. Mucous membranes are not dry. No oropharyngeal exudate, posterior oropharyngeal edema, posterior oropharyngeal erythema or tonsillar abscesses. Eyes: Conjunctivae and EOM are normal. Pupils are equal, round, and reactive to light. Neck: Normal range of motion. Cardiovascular: Intact distal pulses. Pulmonary/Chest: Effort normal. No respiratory distress. Abdominal: Soft. Normal appearance and bowel sounds are normal. She exhibits no distension and no mass.  There is no hepatosplenomegaly. There is no tenderness. There is no rigidity, no rebound, no guarding, no CVA tenderness, no tenderness at McBurney's point and negative Martino's sign. Musculoskeletal: Normal range of motion. Neurological: She is alert and oriented to person, place, and time. Skin: Skin is warm and dry. She is not diaphoretic. Psychiatric: She has a normal mood and affect. Her behavior is normal. Judgment and thought content normal.   Nursing note and vitals reviewed. Diagnostic Study Results     Labs -     Recent Results (from the past 12 hour(s))   URINALYSIS W/ REFLEX CULTURE    Collection Time: 12/15/17  7:42 PM   Result Value Ref Range    Color YELLOW/STRAW      Appearance CLOUDY (A) CLEAR      Specific gravity 1.025 1.003 - 1.030      pH (UA) 7.5 5.0 - 8.0      Protein 30 (A) NEG mg/dL    Glucose NEGATIVE  NEG mg/dL    Ketone NEGATIVE  NEG mg/dL    Bilirubin NEGATIVE  NEG      Blood SMALL (A) NEG      Urobilinogen 1.0 0.2 - 1.0 EU/dL    Nitrites NEGATIVE  NEG      Leukocyte Esterase NEGATIVE  NEG      WBC 0-4 0 - 4 /hpf    RBC 5-10 0 - 5 /hpf    Epithelial cells MODERATE (A) FEW /lpf    Bacteria 2+ (A) NEG /hpf    UA:UC IF INDICATED URINE CULTURE ORDERED (A) CNI      Mucus 3+ (A) NEG /lpf   HCG URINE, QL. - POC    Collection Time: 12/15/17  7:45 PM   Result Value Ref Range    Pregnancy test,urine (POC) NEGATIVE  NEG         Radiologic Studies -   No orders to display     CT Results  (Last 48 hours)    None        CXR Results  (Last 48 hours)    None            Medical Decision Making   I am the first provider for this patient. I reviewed the vital signs, available nursing notes, past medical history, past surgical history, family history and social history. Vital Signs-Reviewed the patient's vital signs.   Patient Vitals for the past 12 hrs:   Temp Pulse Resp BP SpO2   12/15/17 1917 97.8 °F (36.6 °C) 67 16 122/85 100 %       Records Reviewed: Nursing Notes    Provider Notes (Medical Decision Making):   DDx: gastroenteritis, food poisoning, uti, pregnancy, viral illness, flu    ED Course:   Initial assessment performed. The patients presenting problems have been discussed, and they are in agreement with the care plan formulated and outlined with them. I have encouraged them to ask questions as they arise throughout their visit. 8:19 PM  Moderate epithelial cells in urine sample w/ hx of similar. No urinary sxs; will not tx with abx at this time. Reinforce PO fluid hydration. Pt refused influenza rapid test. Tolerating PO food and fluids in room. Disposition:  8:19 PM  I have discussed with patient their diagnosis, treatment, and follow up plan. The patient agrees to follow up as outlined in discharge paperwork and also to return to the ED with any worsening. Lucas Barrios PA-C    PLAN:  1. Current Discharge Medication List      CONTINUE these medications which have NOT CHANGED    Details   ibuprofen (MOTRIN) 800 mg tablet Take 1 Tab by mouth every eight (8) hours as needed for Pain for up to 30 doses. Qty: 1 Tab, Refills: 0           2. Follow-up Information     Follow up With Details Comments [id number] [facility] [place] Schedule an appointment as soon as possible for a visit in 1 week As needed, If symptoms worsen [address] Gonzalez  [phone]        Return to ED if worse     Diagnosis     Clinical Impression:   1.  Non-intractable vomiting without nausea, unspecified vomiting type

## 2017-12-16 NOTE — ED TRIAGE NOTES
Pt noted to have a bag with a large subway sub, chips, a soda and a cookie in triage. Pt states \"I haven't eaten anything yet. \"

## 2017-12-16 NOTE — DISCHARGE INSTRUCTIONS
Nausea and Vomiting: Care Instructions  Your Care Instructions    When you are nauseated, you may feel weak and sweaty and notice a lot of saliva in your mouth. Nausea often leads to vomiting. Most of the time you do not need to worry about nausea and vomiting, but they can be signs of other illnesses. Two common causes of nausea and vomiting are stomach flu and food poisoning. Nausea and vomiting from viral stomach flu will usually start to improve within 24 hours. Nausea and vomiting from food poisoning may last from 12 to 48 hours. The doctor has checked you carefully, but problems can develop later. If you notice any problems or new symptoms, get medical treatment right away. Follow-up care is a key part of your treatment and safety. Be sure to make and go to all appointments, and call your doctor if you are having problems. It's also a good idea to know your test results and keep a list of the medicines you take. How can you care for yourself at home? · To prevent dehydration, drink plenty of fluids, enough so that your urine is light yellow or clear like water. Choose water and other caffeine-free clear liquids until you feel better. If you have kidney, heart, or liver disease and have to limit fluids, talk with your doctor before you increase the amount of fluids you drink. · Rest in bed until you feel better. · When you are able to eat, try clear soups, mild foods, and liquids until all symptoms are gone for 12 to 48 hours. Other good choices include dry toast, crackers, cooked cereal, and gelatin dessert, such as Jell-O. When should you call for help? Call 911 anytime you think you may need emergency care. For example, call if:  ? · You passed out (lost consciousness). ?Call your doctor now or seek immediate medical care if:  ? · You have symptoms of dehydration, such as:  ¨ Dry eyes and a dry mouth. ¨ Passing only a little dark urine.   ¨ Feeling thirstier than usual.   ? · You have new or worsening belly pain. ? · You have a new or higher fever. ? · You vomit blood or what looks like coffee grounds. ? Watch closely for changes in your health, and be sure to contact your doctor if:  ? · You have ongoing nausea and vomiting. ? · Your vomiting is getting worse. ? · Your vomiting lasts longer than 2 days. ? · You are not getting better as expected. Where can you learn more? Go to http://sandy-asmita.info/. Enter 25 [id number] in the search box to learn more about \"Nausea and Vomiting: Care Instructions. \"  Current as of: March 20, 2017  Content Version: 11.4  © 1529-9511 [employer]. Care instructions adapted under license by [employer] (which disclaims liability or warranty for this information). If you have questions about a medical condition or this instruction, always ask your healthcare professional. [address] any warranty or liability for your use of this information.

## 2017-12-17 LAB
BACTERIA SPEC CULT: NORMAL
CC UR VC: NORMAL
SERVICE CMNT-IMP: NORMAL

## 2018-04-20 ENCOUNTER — HOSPITAL ENCOUNTER (EMERGENCY)
Age: 23
Discharge: HOME | End: 2018-04-20
Attending: EMERGENCY MEDICINE
Payer: SELF-PAY

## 2018-04-20 VITALS
WEIGHT: 108.03 LBS | RESPIRATION RATE: 18 BRPM | OXYGEN SATURATION: 100 % | TEMPERATURE: 97.6 F | SYSTOLIC BLOOD PRESSURE: 146 MMHG | HEIGHT: 62 IN | HEART RATE: 87 BPM | DIASTOLIC BLOOD PRESSURE: 99 MMHG | BODY MASS INDEX: 19.88 KG/M2

## 2018-04-20 DIAGNOSIS — N91.2 AMENORRHEA: Primary | ICD-10-CM

## 2018-04-20 LAB
APPEARANCE UR: ABNORMAL
BACTERIA URNS QL MICRO: NEGATIVE /HPF
BILIRUB UR QL: NEGATIVE
CLUE CELLS VAG QL WET PREP: NORMAL
COLOR UR: ABNORMAL
EPITH CASTS URNS QL MICRO: ABNORMAL /LPF
GLUCOSE UR STRIP.AUTO-MCNC: NEGATIVE MG/DL
HCG UR QL: NEGATIVE
HGB UR QL STRIP: NEGATIVE
KETONES UR QL STRIP.AUTO: NEGATIVE MG/DL
KOH PREP SPEC: NORMAL
LEUKOCYTE ESTERASE UR QL STRIP.AUTO: ABNORMAL
NITRITE UR QL STRIP.AUTO: NEGATIVE
PH UR STRIP: 6 [PH] (ref 5–8)
PROT UR STRIP-MCNC: NEGATIVE MG/DL
RBC #/AREA URNS HPF: ABNORMAL /HPF (ref 0–5)
SERVICE CMNT-IMP: NORMAL
SP GR UR REFRACTOMETRY: 1.02 (ref 1–1.03)
T VAGINALIS VAG QL WET PREP: NORMAL
UA: UC IF INDICATED,UAUC: ABNORMAL
UROBILINOGEN UR QL STRIP.AUTO: 1 EU/DL (ref 0.2–1)
WBC URNS QL MICRO: ABNORMAL /HPF (ref 0–4)

## 2018-04-20 PROCEDURE — 81025 URINE PREGNANCY TEST: CPT

## 2018-04-20 PROCEDURE — 87210 SMEAR WET MOUNT SALINE/INK: CPT | Performed by: PHYSICIAN ASSISTANT

## 2018-04-20 PROCEDURE — 81001 URINALYSIS AUTO W/SCOPE: CPT | Performed by: PHYSICIAN ASSISTANT

## 2018-04-20 PROCEDURE — 99284 EMERGENCY DEPT VISIT MOD MDM: CPT

## 2018-04-20 PROCEDURE — 87491 CHLMYD TRACH DNA AMP PROBE: CPT | Performed by: PHYSICIAN ASSISTANT

## 2018-04-20 NOTE — ED NOTES
Pt presents ambulatory to ED complaining of not having a period since January. Pt reports she has been off Depo for 2 years and got her periods regularly November-January, but has not had a period since. Pt is alert and oriented x 4, RR even and unlabored, skin is warm and dry. Assesment completed and pt updated on plan of care. Emergency Department Nursing Plan of Care       The Nursing Plan of Care is developed from the Nursing assessment and Emergency Department Attending provider initial evaluation. The plan of care may be reviewed in the ED Provider note.     The Plan of Care was developed with the following considerations:   Patient / Family readiness to learn indicated by:verbalized understanding  Persons(s) to be included in education: patient  Barriers to Learning/Limitations:No    Signed     Lexie Dozier RN    4/20/2018   12:07 PM

## 2018-04-20 NOTE — ED PROVIDER NOTES
EMERGENCY DEPARTMENT HISTORY AND PHYSICAL EXAM    Date: 4/20/2018  Patient Name: Gely Lee    History of Presenting Illness     Chief Complaint   Patient presents with    Missed Menses     taken off Depo, LMP in January, had spotting in Feb, home test negative          History Provided By: Patient      HPI: Gely Lee is a 26 y.o. female with a PMH of asthma, seizure and tobacco exposure who presents with acute concern for pregnancy w/ LMP 2/2018. Pt denies any pain presently. Endorses hx of depo shots but discontinued 2 years ago. MP began 9/2017 and were regular. Last full MP in 1/2018 and spotting in 2/2018. Endorses intermittent mild lower abd cramping and urinary frequency. Endorses she has unprotected sex and would like to be checked for STI. Denies any new vaginal discharge, vaginal bleeding, dysuria, urgency, n/v, fever, chills, diarrhea, constipation. No modifying factors. PCP: None        Past History     Past Medical History:  Past Medical History:   Diagnosis Date    Asthma     Second hand smoke exposure     Seizure [facility])     mother unsure what kind of seizure, last one was in 1st grade       Past Surgical History:  History reviewed. No pertinent surgical history. Family History:  History reviewed. No pertinent family history. Social History:  Social History   Substance Use Topics    Smoking status: Former Smoker     Packs/day: 0.25    Smokeless tobacco: Never Used    Alcohol use No       Allergies: Allergies   Allergen Reactions    Amoxicillin Hives         Review of Systems   Review of Systems   Constitutional: Negative. Negative for activity change, appetite change, chills and fever. HENT: Negative. Negative for congestion, ear pain, postnasal drip and sore throat. Eyes: Negative. Negative for pain and visual disturbance. Respiratory: Negative. Negative for cough and shortness of breath. Cardiovascular: Negative. Negative for chest pain.    Gastrointestinal: Negative for abdominal pain, anal bleeding, diarrhea, nausea, rectal pain and vomiting. Genitourinary: Positive for frequency and menstrual problem. Negative for difficulty urinating, dysuria, flank pain, pelvic pain, urgency, vaginal bleeding, vaginal discharge and vaginal pain. Musculoskeletal: Negative. Negative for joint swelling. Skin: Negative. Negative for rash. Neurological: Negative. Negative for dizziness, light-headedness and headaches. Psychiatric/Behavioral: Negative. Physical Exam     Vitals:    04/20/18 1145   BP: (!) 146/99   Pulse: 87   Resp: 18   Temp: 97.6 °F (36.4 °C)   SpO2: 100%   Weight: 49 kg (108 lb 0.4 oz)   Height: 5' 2\" (1.575 m)     Physical Exam   Constitutional: She is oriented to person, place, and time. She appears well-developed and well-nourished. No distress. HENT:   Head: Normocephalic and atraumatic. Right Ear: Hearing and external ear normal.   Left Ear: Hearing and external ear normal.   Nose: Nose normal.   Eyes: Conjunctivae and EOM are normal. Pupils are equal, round, and reactive to light. Neck: Normal range of motion. Pulmonary/Chest: Effort normal. No respiratory distress. Musculoskeletal: Normal range of motion. Neurological: She is alert and oriented to person, place, and time. Skin: Skin is warm and dry. She is not diaphoretic. Psychiatric: She has a normal mood and affect. Her behavior is normal. Judgment and thought content normal.   Nursing note and vitals reviewed.         Diagnostic Study Results     Labs -     Recent Results (from the past 12 hour(s))   HCG URINE, QL. - POC    Collection Time: 04/20/18 11:57 AM   Result Value Ref Range    Pregnancy test,urine (POC) NEGATIVE  NEG     URINALYSIS W/ REFLEX CULTURE    Collection Time: 04/20/18 12:13 PM   Result Value Ref Range    Color YELLOW/STRAW      Appearance CLOUDY (A) CLEAR      Specific gravity 1.020 1.003 - 1.030      pH (UA) 6.0 5.0 - 8.0      Protein NEGATIVE  NEG mg/dL Glucose NEGATIVE  NEG mg/dL    Ketone NEGATIVE  NEG mg/dL    Bilirubin NEGATIVE  NEG      Blood NEGATIVE  NEG      Urobilinogen 1.0 0.2 - 1.0 EU/dL    Nitrites NEGATIVE  NEG      Leukocyte Esterase MODERATE (A) NEG      WBC 0-4 0 - 4 /hpf    RBC 0-5 0 - 5 /hpf    Epithelial cells MODERATE (A) FEW /lpf    Bacteria NEGATIVE  NEG /hpf    UA:UC IF INDICATED CULTURE NOT INDICATED BY UA RESULT CNI     WET PREP    Collection Time: 04/20/18 12:13 PM   Result Value Ref Range    Clue cells CLUE CELLS ABSENT      Wet prep NO TRICHOMONAS SEEN     KOH, OTHER SOURCES    Collection Time: 04/20/18 12:13 PM   Result Value Ref Range    Special Requests: NO SPECIAL REQUESTS      KOH NO YEAST SEEN         Radiologic Studies -   No orders to display     CT Results  (Last 48 hours)    None        CXR Results  (Last 48 hours)    None            Medical Decision Making   I am the first provider for this patient. I reviewed the vital signs, available nursing notes, past medical history, past surgical history, family history and social history. Vital Signs-Reviewed the patient's vital signs. Records Reviewed: Nursing Notes, Old Medical Records and Previous Laboratory Studies    ED Course:   Pt opting for self-directed vaginal sampling based on CDC recommendations. No pain, vaginal discharge, bleeding, rash. Pt denies concern for STI and refuses tx at this time. Educated pt on risk of PID, infertility and possible sepsis/ death. Pt endorses understanding. Disposition:    DISCHARGE NOTE:   12:39 PM      Care plan outlined and precautions discussed. Patient has no new complaints, changes, or physical findings. Results of labs were reviewed with the patient. All medications were reviewed with the patient; will d/c home with no medications. All of pt's questions and concerns were addressed. Patient was instructed and agrees to follow up with GYN, as well as to return to the ED upon further deterioration.  Patient is ready to go home.    Follow-up Information     Follow up With Details Comments [address] NP Schedule an appointment as soon as possible for a visit in 1 week As needed, If symptoms worsen [address] [place] [address]  [phone]            There are no discharge medications for this patient. Provider Notes (Medical Decision Making):   DDx: secondary amenorrhea, pregnancy, uti, bv, vaginal candidiasis, sti    Procedures:  Procedures        Diagnosis     Clinical Impression:   1.  Amenorrhea

## 2018-04-20 NOTE — ED NOTES
Discharge instructions were given to the patient by FAUZIA Jade. The patient left the Emergency Department ambulatory, alert and oriented and in no acute distress with 0 prescriptions. The patient was encouraged to call or return to the ED for worsening issues or problems and was encouraged to schedule a follow up appointment for continuing care. The patient verbalized understanding of discharge instructions and prescriptions, all questions were answered. The patient has no further concerns at this time.

## 2018-04-20 NOTE — DISCHARGE INSTRUCTIONS
Secondary Amenorrhea in Teens: Care Instructions  Your Care Instructions    Amenorrhea means you do not have menstrual periods. There are two types. Primary amenorrhea means you never start your periods. Secondary amenorrhea means you have had periods, and then they stop, especially for more than 3 months. Even if you don't have periods, you could still get pregnant. You may not know what caused your periods to stop. Possible causes include pregnancy, hormonal changes, or losing or gaining a lot of weight quickly. Some medicines and stress could also cause it. Being active in endurance sports can also cause you to miss your period or stop menstruating. Female athletes may try to lose or maintain weight in harmful ways. These include dieting too much or binging and purging. But doing these things can lead to eating disorders, amenorrhea, and osteoporosis. If you exercise less or gain a little weight, your periods will probably start again. Your doctor may order tests to find out why your periods have stopped. Your doctor may give you the hormone progestin. It can cause you to have a period. Follow-up care is a key part of your treatment and safety. Be sure to make and go to all appointments, and call your doctor if you are having problems. It's also a good idea to know your test results and keep a list of the medicines you take. How can you care for yourself at home? · Eat a healthy, balanced diet. This includes fruits, vegetables, whole grains, proteins, and low-fat dairy products. · Try not to overdo it when it comes to exercise, unless your doctor told you not to exercise at all. · Use birth control if you do not want to get pregnant. · Tell your doctor about any changes in your menstrual periods. When should you call for help? Call your doctor now or seek immediate medical care if:  ? · You have severe vaginal bleeding. ? · You have new or worse belly or pelvic pain. ? Watch closely for changes in your health, and be sure to contact your doctor if:  ? · You have unusual vaginal bleeding. ? · You think you might be pregnant. ? · You do not get better as expected. Where can you learn more? Go to http://sandy-asmita.info/. Enter I852 in the search box to learn more about \"Secondary Amenorrhea in Teens: Care Instructions. \"  Current as of: October 13, 2016  Content Version: 11.4  © 8172-0995 [employer]. Care instructions adapted under license by [employer] (which disclaims liability or warranty for this information). If you have questions about a medical condition or this instruction, always ask your healthcare professional. [address] any warranty or liability for your use of this information.

## 2018-07-15 ENCOUNTER — HOSPITAL ENCOUNTER (EMERGENCY)
Age: 23
Discharge: HOME | End: 2018-07-15
Attending: EMERGENCY MEDICINE
Payer: SELF-PAY

## 2018-07-15 VITALS
WEIGHT: 108 LBS | OXYGEN SATURATION: 99 % | HEART RATE: 86 BPM | TEMPERATURE: 97.9 F | SYSTOLIC BLOOD PRESSURE: 131 MMHG | RESPIRATION RATE: 18 BRPM | HEIGHT: 63 IN | BODY MASS INDEX: 19.14 KG/M2 | DIASTOLIC BLOOD PRESSURE: 93 MMHG

## 2018-07-15 DIAGNOSIS — N39.0 URINARY TRACT INFECTION WITHOUT HEMATURIA, SITE UNSPECIFIED: Primary | ICD-10-CM

## 2018-07-15 LAB
APPEARANCE UR: ABNORMAL
BACTERIA URNS QL MICRO: ABNORMAL /HPF
BILIRUB UR QL: NEGATIVE
CLUE CELLS VAG QL WET PREP: NORMAL
COLOR UR: ABNORMAL
EPITH CASTS URNS QL MICRO: ABNORMAL /LPF
GLUCOSE UR STRIP.AUTO-MCNC: NEGATIVE MG/DL
HCG UR QL: NEGATIVE
HGB UR QL STRIP: NEGATIVE
KETONES UR QL STRIP.AUTO: NEGATIVE MG/DL
KOH PREP SPEC: NORMAL
LEUKOCYTE ESTERASE UR QL STRIP.AUTO: ABNORMAL
MUCOUS THREADS URNS QL MICRO: ABNORMAL /LPF
NITRITE UR QL STRIP.AUTO: NEGATIVE
PH UR STRIP: 6.5 [PH] (ref 5–8)
PROT UR STRIP-MCNC: 30 MG/DL
RBC #/AREA URNS HPF: ABNORMAL /HPF (ref 0–5)
SERVICE CMNT-IMP: NORMAL
SP GR UR REFRACTOMETRY: 1.02 (ref 1–1.03)
T VAGINALIS VAG QL WET PREP: NORMAL
UA: UC IF INDICATED,UAUC: ABNORMAL
UROBILINOGEN UR QL STRIP.AUTO: 2 EU/DL (ref 0.2–1)
WBC URNS QL MICRO: >100 /HPF (ref 0–4)

## 2018-07-15 PROCEDURE — 87210 SMEAR WET MOUNT SALINE/INK: CPT | Performed by: PHYSICIAN ASSISTANT

## 2018-07-15 PROCEDURE — 74011250636 HC RX REV CODE- 250/636: Performed by: PHYSICIAN ASSISTANT

## 2018-07-15 PROCEDURE — 87086 URINE CULTURE/COLONY COUNT: CPT | Performed by: PHYSICIAN ASSISTANT

## 2018-07-15 PROCEDURE — 74011000250 HC RX REV CODE- 250: Performed by: PHYSICIAN ASSISTANT

## 2018-07-15 PROCEDURE — 87491 CHLMYD TRACH DNA AMP PROBE: CPT | Performed by: PHYSICIAN ASSISTANT

## 2018-07-15 PROCEDURE — 74011250637 HC RX REV CODE- 250/637: Performed by: PHYSICIAN ASSISTANT

## 2018-07-15 PROCEDURE — 81001 URINALYSIS AUTO W/SCOPE: CPT | Performed by: PHYSICIAN ASSISTANT

## 2018-07-15 PROCEDURE — 81025 URINE PREGNANCY TEST: CPT

## 2018-07-15 PROCEDURE — 96372 THER/PROPH/DIAG INJ SC/IM: CPT

## 2018-07-15 PROCEDURE — 99284 EMERGENCY DEPT VISIT MOD MDM: CPT

## 2018-07-15 RX ORDER — NITROFURANTOIN 25; 75 MG/1; MG/1
100 CAPSULE ORAL 2 TIMES DAILY
Qty: 14 CAP | Refills: 0 | Status: SHIPPED | OUTPATIENT
Start: 2018-07-15 | End: 2018-07-22

## 2018-07-15 RX ORDER — AZITHROMYCIN 500 MG/1
1000 TABLET, FILM COATED ORAL
Status: COMPLETED | OUTPATIENT
Start: 2018-07-15 | End: 2018-07-15

## 2018-07-15 RX ORDER — FLUCONAZOLE 150 MG/1
150 TABLET ORAL
Qty: 1 TAB | Refills: 0 | Status: SHIPPED | OUTPATIENT
Start: 2018-07-16 | End: 2018-07-16

## 2018-07-15 RX ADMIN — AZITHROMYCIN 1000 MG: 500 TABLET, FILM COATED ORAL at 22:41

## 2018-07-15 RX ADMIN — LIDOCAINE HYDROCHLORIDE 250 MG: 10 INJECTION, SOLUTION EPIDURAL; INFILTRATION; INTRACAUDAL; PERINEURAL at 22:41

## 2018-07-15 NOTE — LETTER
7/16/2018 Farzane[address] Beth Byron Willis [address] [facility] [address] [id number] Dear Ms. Mendota Gagrover You were seen in the Emergency Department of [address] [place] [address] on 7/15/18 and had lab and/or radiology tests performed. The gonorrhea from your Emergency Department visit on 7/15/18 was positive. You were treated appropriately during your visit. No further treatment is required. If you have not improved or are worsening, please follow up with your primary care doctor or Emergency department as soon as possible. Your partner needs to be treated. If you have any questions please contact the Emergency Department at [phone]. Sincerely, Fatmata Corrales PA-C 
 
[facility] EMERGENCY DEPT 
[address] [place] [address] [phone] [phone]

## 2018-07-16 LAB
C TRACH DNA SPEC QL NAA+PROBE: NEGATIVE
N GONORRHOEA DNA SPEC QL NAA+PROBE: POSITIVE
SAMPLE TYPE: ABNORMAL
SERVICE CMNT-IMP: ABNORMAL
SPECIMEN SOURCE: ABNORMAL

## 2018-07-16 NOTE — DISCHARGE INSTRUCTIONS
Urinary Tract Infection in Women: Care Instructions  Your Care Instructions    A urinary tract infection, or UTI, is a general term for an infection anywhere between the kidneys and the urethra (where urine comes out). Most UTIs are bladder infections. They often cause pain or burning when you urinate. UTIs are caused by bacteria and can be cured with antibiotics. Be sure to complete your treatment so that the infection goes away. Follow-up care is a key part of your treatment and safety. Be sure to make and go to all appointments, and call your doctor if you are having problems. It's also a good idea to know your test results and keep a list of the medicines you take. How can you care for yourself at home? · Take your antibiotics as directed. Do not stop taking them just because you feel better. You need to take the full course of antibiotics. · Drink extra water and other fluids for the next day or two. This may help wash out the bacteria that are causing the infection. (If you have kidney, heart, or liver disease and have to limit fluids, talk with your doctor before you increase your fluid intake.)  · Avoid drinks that are carbonated or have caffeine. They can irritate the bladder. · Urinate often. Try to empty your bladder each time. · To relieve pain, take a hot bath or lay a heating pad set on low over your lower belly or genital area. Never go to sleep with a heating pad in place. To prevent UTIs  · Drink plenty of water each day. This helps you urinate often, which clears bacteria from your system. (If you have kidney, heart, or liver disease and have to limit fluids, talk with your doctor before you increase your fluid intake.)  · Urinate when you need to. · Urinate right after you have sex. · Change sanitary pads often. · Avoid douches, bubble baths, feminine hygiene sprays, and other feminine hygiene products that have deodorants.   · After going to the bathroom, wipe from front to back.  When should you call for help? Call your doctor now or seek immediate medical care if:    · Symptoms such as fever, chills, nausea, or vomiting get worse or appear for the first time.     · You have new pain in your back just below your rib cage. This is called flank pain.     · There is new blood or pus in your urine.     · You have any problems with your antibiotic medicine.    Watch closely for changes in your health, and be sure to contact your doctor if:    · You are not getting better after taking an antibiotic for 2 days.     · Your symptoms go away but then come back. Where can you learn more? Go to http://sandy-asmita.info/. Enter H535 in the search box to learn more about \"Urinary Tract Infection in Women: Care Instructions. \"  Current as of: May 12, 2017  Content Version: 11.7  © 4960-6120 [employer]. Care instructions adapted under license by [employer] (which disclaims liability or warranty for this information). If you have questions about a medical condition or this instruction, always ask your healthcare professional. [address] any warranty or liability for your use of this information.

## 2018-07-16 NOTE — ED NOTES
Pt presents to ED ambulatory complaining of vaginal discharge x a few days. Pt states she thinks it's either a UTI or a yeast infection. Pt states she does have a new sexual partner but denies any chance of STDs or pregnancy. Pt is alert and oriented x 4, RR even and unlabored, skin is warm and dry. Assessment completed and pt updated on plan of care. Emergency Department Nursing Plan of Care       The Nursing Plan of Care is developed from the Nursing assessment and Emergency Department Attending provider initial evaluation. The plan of care may be reviewed in the ED Provider note.     The Plan of Care was developed with the following considerations:   Patient / Family readiness to learn indicated by:verbalized understanding  Persons(s) to be included in education: patient  Barriers to Learning/Limitations:No    Signed     Karla Lebron    7/15/2018   10:25 PM

## 2018-07-16 NOTE — ED PROVIDER NOTES
EMERGENCY DEPARTMENT HISTORY AND PHYSICAL EXAM    Date: 7/15/2018  Patient Name: Raine Crawford    History of Presenting Illness     Chief Complaint   Patient presents with   [facility] Vaginal Discharge     White chunky X 1 week w/ \"yeasty\" odor, itching, and dysuria         History Provided By: Patient      HPI: Raine Crawford is a 26 y.o. female with a PMH of asthma and seizure who presents with acute moderate dysuria and thick cheese-like malodorous pruritic vaginal discharge x 1 week. No modifying factors. Denies fever, chills, n/v, abd pain, pelvic pain, vaginal bleeding, constipation, diarrhea. LMP 6/18/18. No BC; +sexually active. PCP: None    Current Outpatient Prescriptions   Medication Sig Dispense Refill    nitrofurantoin, macrocrystal-monohydrate, (MACROBID) 100 mg capsule Take 1 Cap by mouth two (2) times a day for 7 days. 14 Cap 0    [START ON 7/16/2018] fluconazole (DIFLUCAN) 150 mg tablet Take 1 Tab by mouth now for 1 dose. FDA advises cautious prescribing of oral fluconazole in pregnancy. 1 Tab 0       Past History     Past Medical History:  Past Medical History:   Diagnosis Date    Asthma     Second hand smoke exposure     Seizure [facility])     mother unsure what kind of seizure, last one was in 1st grade       Past Surgical History:  History reviewed. No pertinent surgical history. Family History:  History reviewed. No pertinent family history. Social History:  Social History   Substance Use Topics    Smoking status: Former Smoker     Packs/day: 0.25    Smokeless tobacco: Never Used    Alcohol use No       Allergies: Allergies   Allergen Reactions    Amoxicillin Hives         Review of Systems   Review of Systems   Constitutional: Negative. Negative for activity change, appetite change, chills and fever. HENT: Negative. Negative for congestion, ear pain, postnasal drip and sore throat. Eyes: Negative. Negative for pain and visual disturbance. Respiratory: Negative.   Negative for cough and shortness of breath. Cardiovascular: Negative. Negative for chest pain. Gastrointestinal: Negative for abdominal pain, anal bleeding, diarrhea, nausea, rectal pain and vomiting. Genitourinary: Positive for dysuria and vaginal discharge. Negative for difficulty urinating, flank pain, frequency, menstrual problem, pelvic pain, urgency, vaginal bleeding and vaginal pain. Musculoskeletal: Negative. Negative for joint swelling. Skin: Negative. Negative for rash. Neurological: Negative. Negative for dizziness, light-headedness and headaches. Psychiatric/Behavioral: Negative. Physical Exam     Vitals:    07/15/18 2216   BP: (!) 131/93   Pulse: 86   Resp: 18   Temp: 97.9 °F (36.6 °C)   SpO2: 99%   Weight: 49 kg (108 lb)   Height: 5' 2.5\" (1.588 m)     Physical Exam   Constitutional: She is oriented to person, place, and time. She appears well-developed and well-nourished. No distress. HENT:   Head: Normocephalic and atraumatic. Right Ear: Hearing and external ear normal.   Left Ear: Hearing and external ear normal.   Nose: Nose normal.   Eyes: Conjunctivae and EOM are normal. Pupils are equal, round, and reactive to light. Neck: Normal range of motion. Cardiovascular: Normal rate, regular rhythm, normal heart sounds and intact distal pulses. Pulmonary/Chest: Effort normal and breath sounds normal. No respiratory distress. Abdominal: Soft. Bowel sounds are normal. There is no tenderness. There is no rebound and no guarding. Musculoskeletal: Normal range of motion. Neurological: She is alert and oriented to person, place, and time. Skin: Skin is warm and dry. She is not diaphoretic. Psychiatric: She has a normal mood and affect. Her behavior is normal. Judgment and thought content normal.   Nursing note and vitals reviewed.         Diagnostic Study Results     Labs -     Recent Results (from the past 12 hour(s))   HCG URINE, QL. - POC    Collection Time: 07/15/18 10:25 PM   Result Value Ref Range    Pregnancy test,urine (POC) NEGATIVE  NEG     URINALYSIS W/ REFLEX CULTURE    Collection Time: 07/15/18 10:27 PM   Result Value Ref Range    Color YELLOW/STRAW      Appearance CLOUDY (A) CLEAR      Specific gravity 1.020 1.003 - 1.030      pH (UA) 6.5 5.0 - 8.0      Protein 30 (A) NEG mg/dL    Glucose NEGATIVE  NEG mg/dL    Ketone NEGATIVE  NEG mg/dL    Bilirubin NEGATIVE  NEG      Blood NEGATIVE  NEG      Urobilinogen 2.0 (H) 0.2 - 1.0 EU/dL    Nitrites NEGATIVE  NEG      Leukocyte Esterase LARGE (A) NEG      WBC >100 (H) 0 - 4 /hpf    RBC 0-5 0 - 5 /hpf    Epithelial cells FEW FEW /lpf    Bacteria 1+ (A) NEG /hpf    UA:UC IF INDICATED URINE CULTURE ORDERED (A) CNI      Mucus 2+ (A) NEG /lpf   WET PREP    Collection Time: 07/15/18 10:46 PM   Result Value Ref Range    Clue cells CLUE CELLS ABSENT      Wet prep NO TRICHOMONAS SEEN     KOH, OTHER SOURCES    Collection Time: 07/15/18 10:46 PM   Result Value Ref Range    Special Requests: NO SPECIAL REQUESTS      KOH NO YEAST SEEN         Radiologic Studies -   No orders to display     CT Results  (Last 48 hours)    None        CXR Results  (Last 48 hours)    None            Medical Decision Making   I am the first provider for this patient. I reviewed the vital signs, available nursing notes, past medical history, past surgical history, family history and social history. Vital Signs-Reviewed the patient's vital signs. Records Reviewed: Nursing Notes, Old Medical Records and Previous Laboratory Studies    ED Course:     Disposition:    DISCHARGE NOTE:   11:08 PM      Care plan outlined and precautions discussed. Patient has no new complaints, changes, or physical findings. Results of labs were reviewed with the patient. All medications were reviewed with the patient; will d/c home with macrobid and diflucan. All of pt's questions and concerns were addressed.  Patient was instructed and agrees to follow up with PCP/ GYN, as well as to return to the ED upon further deterioration. Patient is ready to go home. Follow-up Information     Follow up With Details Comments [address] [place] NP Schedule an appointment as soon as possible for a visit in 1 week As needed, If symptoms worsen [address] [place] [address]  [phone]            Current Discharge Medication List      START taking these medications    Details   nitrofurantoin, macrocrystal-monohydrate, (MACROBID) 100 mg capsule Take 1 Cap by mouth two (2) times a day for 7 days. Qty: 14 Cap, Refills: 0      fluconazole (DIFLUCAN) 150 mg tablet Take 1 Tab by mouth now for 1 dose. FDA advises cautious prescribing of oral fluconazole in pregnancy. Qty: 1 Tab, Refills: 0             Provider Notes (Medical Decision Making):   DDx: bv, vaginal candidiasis, sti, pid, uti, pregnancy    Procedures:  Procedures        Diagnosis     Clinical Impression:   1.  Urinary tract infection without hematuria, site unspecified

## 2018-07-16 NOTE — ED NOTES
This RN assumes role as preceptor to Nurse Bill Dyer. This RN reviews all assessments, charting, medication administration, and skills performed by the preceptee.

## 2018-07-17 LAB
BACTERIA SPEC CULT: NORMAL
CC UR VC: NORMAL
SERVICE CMNT-IMP: NORMAL

## 2019-03-06 ENCOUNTER — HOSPITAL ENCOUNTER (EMERGENCY)
Age: 24
Discharge: HOME | End: 2019-03-06
Attending: EMERGENCY MEDICINE
Payer: COMMERCIAL

## 2019-03-06 VITALS
SYSTOLIC BLOOD PRESSURE: 122 MMHG | HEART RATE: 84 BPM | RESPIRATION RATE: 16 BRPM | TEMPERATURE: 97.9 F | HEIGHT: 63 IN | DIASTOLIC BLOOD PRESSURE: 84 MMHG | OXYGEN SATURATION: 100 % | WEIGHT: 140 LBS | BODY MASS INDEX: 24.8 KG/M2

## 2019-03-06 DIAGNOSIS — J06.9 VIRAL URI: Primary | ICD-10-CM

## 2019-03-06 LAB
FLUAV AG NPH QL IA: NEGATIVE
FLUBV AG NOSE QL IA: NEGATIVE

## 2019-03-06 PROCEDURE — 87804 INFLUENZA ASSAY W/OPTIC: CPT

## 2019-03-06 PROCEDURE — 99282 EMERGENCY DEPT VISIT SF MDM: CPT

## 2019-03-06 RX ORDER — OXYMETAZOLINE HCL 0.05 %
2 SPRAY, NON-AEROSOL (ML) NASAL 2 TIMES DAILY
Qty: 1 BOTTLE | Refills: 0 | Status: SHIPPED | OUTPATIENT
Start: 2019-03-06 | End: 2019-03-09

## 2019-03-06 NOTE — DISCHARGE INSTRUCTIONS
Patient Education        Saline Nasal Washes: Care Instructions  Your Care Instructions  Saline nasal washes help keep the nasal passages open by washing out thick or dried mucus. This simple remedy can help relieve symptoms of allergies, sinusitis, and colds. It also can make the nose feel more comfortable by keeping the mucous membranes moist. You may notice a little burning sensation in your nose the first few times you use the solution, but this usually gets better in a few days. Follow-up care is a key part of your treatment and safety. Be sure to make and go to all appointments, and call your doctor if you are having problems. It's also a good idea to know your test results and keep a list of the medicines you take. How can you care for yourself at home? · You can buy premixed saline solution in a squeeze bottle or other sinus rinse products at a drugstore. Read and follow the instructions on the label. · You also can make your own saline solution by adding 1 teaspoon of salt and 1 teaspoon of baking soda to 2 cups of distilled water. · If you use a homemade solution, pour a small amount into a clean bowl. Using a rubber bulb syringe, squeeze the syringe and place the tip in the salt water. Pull a small amount of the salt water into the syringe by relaxing your hand. · Sit down with your head tilted slightly back. Do not lie down. Put the tip of the bulb syringe or the squeeze bottle a little way into one of your nostrils. Gently drip or squirt a few drops into the nostril. Repeat with the other nostril. Some sneezing and gagging are normal at first.  · Gently blow your nose. · Wipe the syringe or bottle tip clean after each use. · Repeat this 2 or 3 times a day. · Use nasal washes gently if you have nosebleeds often. When should you call for help?   Watch closely for changes in your health, and be sure to contact your doctor if:    · You often get nosebleeds.     · You have problems doing the nasal washes. Where can you learn more? Go to http://sandy-asmita.info/. Enter [id number] in the search box to learn more about \"Saline Nasal Washes: Care Instructions. \"  Current as of: March 27, 2018  Content Version: 11.9  © 6894-1549 [employer]. Care instructions adapted under license by [employer] (which disclaims liability or warranty for this information). If you have questions about a medical condition or this instruction, always ask your healthcare professional. [place] [address] any warranty or liability for your use of this information. Patient Education        Upper Respiratory Infection (Cold): Care Instructions  Your Care Instructions    An upper respiratory infection, or URI, is an infection of the nose, sinuses, or throat. URIs are spread by coughs, sneezes, and direct contact. The common cold is the most frequent kind of URI. The flu and sinus infections are other kinds of URIs. Almost all URIs are caused by viruses. Antibiotics won't cure them. But you can treat most infections with home care. This may include drinking lots of fluids and taking over-the-counter pain medicine. You will probably feel better in 4 to 10 days. The doctor has checked you carefully, but problems can develop later. If you notice any problems or new symptoms, get medical treatment right away. Follow-up care is a key part of your treatment and safety. Be sure to make and go to all appointments, and call your doctor if you are having problems. It's also a good idea to know your test results and keep a list of the medicines you take. How can you care for yourself at home? · To prevent dehydration, drink plenty of fluids, enough so that your urine is light yellow or clear like water. Choose water and other caffeine-free clear liquids until you feel better.  If you have kidney, heart, or liver disease and have to limit fluids, talk with your doctor before you increase the amount of fluids you drink. · Take an over-the-counter pain medicine, such as acetaminophen (Tylenol), ibuprofen (Advil, Motrin), or naproxen (Aleve). Read and follow all instructions on the label. · Before you use cough and cold medicines, check the label. These medicines may not be safe for young children or for people with certain health problems. · Be careful when taking over-the-counter cold or flu medicines and Tylenol at the same time. Many of these medicines have acetaminophen, which is Tylenol. Read the labels to make sure that you are not taking more than the recommended dose. Too much acetaminophen (Tylenol) can be harmful. · Get plenty of rest.  · Do not smoke or allow others to smoke around you. If you need help quitting, talk to your doctor about stop-smoking programs and medicines. These can increase your chances of quitting for good. When should you call for help? Call 911 anytime you think you may need emergency care. For example, call if:    · You have severe trouble breathing.    Call your doctor now or seek immediate medical care if:    · You seem to be getting much sicker.     · You have new or worse trouble breathing.     · You have a new or higher fever.     · You have a new rash.    Watch closely for changes in your health, and be sure to contact your doctor if:    · You have a new symptom, such as a sore throat, an earache, or sinus pain.     · You cough more deeply or more often, especially if you notice more mucus or a change in the color of your mucus.     · You do not get better as expected. Where can you learn more? Go to http://sandy-asmita.info/. Enter N208 in the search box to learn more about \"Upper Respiratory Infection (Cold): Care Instructions. \"  Current as of: September 5, 2018  Content Version: 11.9  © 0930-1141 [employer]  Care instructions adapted under license by [employer] (which disclaims liability or warranty for this information). If you have questions about a medical condition or this instruction, always ask your healthcare professional. [place] [address] any warranty or liability for your use of this information.

## 2019-03-06 NOTE — ED PROVIDER NOTES
EMERGENCY DEPARTMENT HISTORY AND PHYSICAL EXAM      Date: 3/6/2019  Patient Name: Carmen Friedman    History of Presenting Illness     HPI: Carmen Friedman is a 22 y.o. female with PMHx of asthma, current smoker, seizure, presents to the ED for nasal congestion and dry cough x 2 days. Pt says she has generalized, 6/10, intermittent, body aches as well. She denies fever/chills, n/v, abd pain, among other assoc sx's. PCP: None        Past History     Past Medical History:  Past Medical History:   Diagnosis Date    Asthma     Second hand smoke exposure     Seizure [facility])     mother unsure what kind of seizure, last one was in 1st grade       Past Surgical History:  History reviewed. No pertinent surgical history. Family History:  History reviewed. No pertinent family history. Social History:  Social History     Tobacco Use    Smoking status: Former Smoker     Packs/day: 0.25    Smokeless tobacco: Never Used   Substance Use Topics    Alcohol use: No    Drug use: No       Allergies: Allergies   Allergen Reactions    Amoxicillin Hives         Review of Systems   Review of Systems   Constitutional: Negative for activity change, appetite change, chills, diaphoresis, fatigue, fever and unexpected weight change. HENT: Positive for congestion. Negative for dental problem, ear pain, facial swelling, postnasal drip, sinus pressure, sinus pain, sore throat, trouble swallowing and voice change. Eyes: Negative for photophobia and pain. Respiratory: Positive for cough. Negative for shortness of breath and wheezing. Cardiovascular: Negative for chest pain, palpitations and leg swelling. Gastrointestinal: Negative for abdominal pain, blood in stool, constipation, diarrhea, nausea and vomiting. Endocrine: Negative for polydipsia, polyphagia and polyuria. Genitourinary: Negative for dysuria, flank pain, frequency, hematuria, menstrual problem, pelvic pain, urgency, vaginal bleeding and vaginal discharge. Denies pregnancy   Musculoskeletal: Negative for back pain, joint swelling, myalgias, neck pain and neck stiffness. Skin: Negative for color change, pallor, rash and wound. Neurological: Negative for dizziness, syncope, speech difficulty, weakness, light-headedness, numbness and headaches. Physical Exam     Vitals:    03/06/19 1646   BP: 122/84   Pulse: 84   Resp: 16   Temp: 97.9 °F (36.6 °C)   SpO2: 100%   Weight: 63.5 kg (140 lb)   Height: 5' 3\" (1.6 m)     Physical Exam   Constitutional: She is oriented to person, place, and time. She appears well-developed and well-nourished. No distress. HENT:   Head: Normocephalic and atraumatic. Right Ear: External ear normal.   Left Ear: External ear normal.   Mouth/Throat: Oropharynx is clear and moist. No oropharyngeal exudate. TM's normal bilaterally   Neck: Normal range of motion. Neck supple. Cardiovascular: Normal rate, regular rhythm, normal heart sounds and intact distal pulses. Pulmonary/Chest: Effort normal and breath sounds normal. No respiratory distress. She has no wheezes. Musculoskeletal: She exhibits no edema. Lymphadenopathy:     She has no cervical adenopathy. Neurological: She is alert and oriented to person, place, and time. Skin: Skin is warm and dry. No rash noted. She is not diaphoretic. No erythema. No pallor. Psychiatric: She has a normal mood and affect. Her behavior is normal. Judgment and thought content normal.   Nursing note and vitals reviewed.         Diagnostic Study Results     Labs -     Recent Results (from the past 12 hour(s))   INFLUENZA A & B AG (RAPID TEST)    Collection Time: 03/06/19  5:46 PM   Result Value Ref Range    Influenza A Antigen NEGATIVE  NEG      Influenza B Antigen NEGATIVE  NEG         Radiologic Studies -   No orders to display     CT Results  (Last 48 hours)    None        CXR Results  (Last 48 hours)    None            Medical Decision Making   I am the first provider for this patient. I reviewed the vital signs, available nursing notes, past medical history, past surgical history, family history, social history    ED Course:   Initial assessment performed. The patients presenting problems have been discussed, and they are in agreement with the care plan formulated and outlined with them. I have encouraged them to ask questions as they arise throughout their visit. Vital Signs-Reviewed the patient's vital signs. Vitals:    03/06/19 1646   BP: 122/84   BP 1 Location: Left arm   BP Patient Position: Sitting   Pulse: 84   Resp: 16   Temp: 97.9 °F (36.6 °C)   SpO2: 100%   Weight: 63.5 kg (140 lb)   Height: 5' 3\" (1.6 m)       Medications Administered During ED Course  Medications - No data to display    Progress Note:  On re evaluation pt is resting comfortably, is requesting discharge, and has no new complaints, changes, or physical findings. Disposition:  D/c home    DISCHARGE NOTE:   I Counseled the patient on diagnosis and care plan. All available lab and imaging results have been reviewed by me and were discussed with the patient, including all incidental findings. The likelihood of other entities in the differential is insufficient to justify any further testing for them. This was explained to the patient. Patient agrees with plan and agrees to follow up with PCP as recommended, or return to the ED if their symptoms worsen. All medications were reviewed with the patient. All of pt's questions and concerns were addressed. The patient was advised that new or worsening symptoms would require further evaluation and should prompt immediate return to the Emergency Department. Discharge instructions have been provided and explained to the patient, along with reasons to return to the ED. Patient voices understanding and is agreeable with the plan for discharge. Patient is ready to go home.     Follow-up Information     Follow up With Specialties Details Why Contact Info    [facility]  Schedule an appointment as soon as possible for a visit  [address] [facility]  [place], [address] [place] [address] [phone]    [facility] EMERGENCY DEPT Emergency Medicine Go to If symptoms worsen Calista Hernandez          Discharge Medication List as of 3/6/2019  6:15 PM      START taking these medications    Details   oxymetazoline (AFRIN, OXYMETAZOLINE,) 0.05 % nasal spray 2 Sprays by Both Nostrils route two (2) times a day for 3 days. , Normal, Disp-1 Bottle, R-0             Provider Notes (Medical Decision Making):   DDx: viral rhinitis, allergic rhinitis, sinusitis, vasomotor rhinitis, nasal polyposis, rhinitis medicamentosa    Procedures:  Procedures    Critical Care Time: none      Diagnosis     Clinical Impression:   1.  Viral URI

## 2019-03-06 NOTE — LETTER
[facility] EMERGENCY DEPT 
[address] [facility] [address] [address] 
[phone] Work/School Note Date: 3/6/2019 To Whom It May concern: 
 
Pennie White was seen and treated today in the emergency room by the following provider(s): 
Attending Provider: Heavenly Schultz MD 
Physician Assistant: Blake Suresh   
 
Pennie White may return to work on 3/7/18 Sincerely, 
 
 
 
 
FAUZIA Gomez

## 2021-04-07 PROCEDURE — 96365 THER/PROPH/DIAG IV INF INIT: CPT

## 2021-04-07 PROCEDURE — 36415 COLL VENOUS BLD VENIPUNCTURE: CPT

## 2021-04-07 PROCEDURE — 96366 THER/PROPH/DIAG IV INF ADDON: CPT

## 2021-04-07 PROCEDURE — 99283 EMERGENCY DEPT VISIT LOW MDM: CPT

## 2021-04-07 PROCEDURE — 86140 C-REACTIVE PROTEIN: CPT

## 2021-04-07 PROCEDURE — 96375 TX/PRO/DX INJ NEW DRUG ADDON: CPT

## 2021-04-07 PROCEDURE — 80053 COMPREHEN METABOLIC PANEL: CPT

## 2021-04-07 PROCEDURE — 85025 COMPLETE CBC W/AUTO DIFF WBC: CPT

## 2021-04-08 ENCOUNTER — HOSPITAL ENCOUNTER (EMERGENCY)
Age: 26
Discharge: HOME | End: 2021-04-08
Attending: EMERGENCY MEDICINE

## 2021-04-08 VITALS
TEMPERATURE: 101.3 F | SYSTOLIC BLOOD PRESSURE: 105 MMHG | HEART RATE: 115 BPM | DIASTOLIC BLOOD PRESSURE: 69 MMHG | BODY MASS INDEX: 23.92 KG/M2 | RESPIRATION RATE: 16 BRPM | WEIGHT: 135 LBS | HEIGHT: 63 IN | OXYGEN SATURATION: 100 %

## 2021-04-08 DIAGNOSIS — R50.9 FEVER, UNSPECIFIED FEVER CAUSE: Primary | ICD-10-CM

## 2021-04-08 DIAGNOSIS — N39.0 URINARY TRACT INFECTION WITHOUT HEMATURIA, SITE UNSPECIFIED: ICD-10-CM

## 2021-04-08 LAB
ALBUMIN SERPL-MCNC: 3.7 G/DL (ref 3.5–5)
ALBUMIN/GLOB SERPL: 1 {RATIO} (ref 1.1–2.2)
ALP SERPL-CCNC: 73 U/L (ref 45–117)
ALT SERPL-CCNC: 18 U/L (ref 12–78)
AMORPH CRY URNS QL MICRO: ABNORMAL
ANION GAP SERPL CALC-SCNC: 8 MMOL/L (ref 5–15)
APPEARANCE UR: ABNORMAL
AST SERPL-CCNC: 17 U/L (ref 15–37)
BACTERIA URNS QL MICRO: ABNORMAL /HPF
BASOPHILS # BLD: 0 K/UL (ref 0–0.1)
BASOPHILS NFR BLD: 0 % (ref 0–1)
BILIRUB SERPL-MCNC: 0.4 MG/DL (ref 0.2–1)
BILIRUB UR QL: NEGATIVE
BUN SERPL-MCNC: 7 MG/DL (ref 6–20)
BUN/CREAT SERPL: 8 (ref 12–20)
CALCIUM SERPL-MCNC: 8.7 MG/DL (ref 8.5–10.1)
CHLORIDE SERPL-SCNC: 107 MMOL/L (ref 97–108)
CO2 SERPL-SCNC: 25 MMOL/L (ref 21–32)
COLOR UR: ABNORMAL
COMMENT, HOLDF: NORMAL
CREAT SERPL-MCNC: 0.86 MG/DL (ref 0.55–1.02)
CRP SERPL-MCNC: 10.5 MG/DL (ref 0–0.6)
DIFFERENTIAL METHOD BLD: ABNORMAL
EOSINOPHIL # BLD: 0 K/UL (ref 0–0.4)
EOSINOPHIL NFR BLD: 0 % (ref 0–7)
EPITH CASTS URNS QL MICRO: ABNORMAL /LPF
ERYTHROCYTE [DISTWIDTH] IN BLOOD BY AUTOMATED COUNT: 13.3 % (ref 11.5–14.5)
GLOBULIN SER CALC-MCNC: 3.7 G/DL (ref 2–4)
GLUCOSE SERPL-MCNC: 147 MG/DL (ref 65–100)
GLUCOSE UR STRIP.AUTO-MCNC: NEGATIVE MG/DL
HCT VFR BLD AUTO: 35.6 % (ref 35–47)
HGB BLD-MCNC: 11.8 G/DL (ref 11.5–16)
HGB UR QL STRIP: NEGATIVE
IMM GRANULOCYTES # BLD AUTO: 0 K/UL
IMM GRANULOCYTES NFR BLD AUTO: 0 %
KETONES UR QL STRIP.AUTO: ABNORMAL MG/DL
LACTATE SERPL-SCNC: 1.8 MMOL/L (ref 0.4–2)
LEUKOCYTE ESTERASE UR QL STRIP.AUTO: ABNORMAL
LYMPHOCYTES # BLD: 0.8 K/UL (ref 0.8–3.5)
LYMPHOCYTES NFR BLD: 8 % (ref 12–49)
MCH RBC QN AUTO: 30.7 PG (ref 26–34)
MCHC RBC AUTO-ENTMCNC: 33.1 G/DL (ref 30–36.5)
MCV RBC AUTO: 92.7 FL (ref 80–99)
MONOCYTES # BLD: 0.7 K/UL (ref 0–1)
MONOCYTES NFR BLD: 7 % (ref 5–13)
MUCOUS THREADS URNS QL MICRO: ABNORMAL /LPF
NEUTS BAND NFR BLD MANUAL: 8 % (ref 0–6)
NEUTS SEG # BLD: 9 K/UL (ref 1.8–8)
NEUTS SEG NFR BLD: 77 % (ref 32–75)
NITRITE UR QL STRIP.AUTO: NEGATIVE
NRBC # BLD: 0 K/UL (ref 0–0.01)
NRBC BLD-RTO: 0 PER 100 WBC
PH UR STRIP: 6 [PH] (ref 5–8)
PLATELET # BLD AUTO: 135 K/UL (ref 150–400)
PMV BLD AUTO: 12.2 FL (ref 8.9–12.9)
POTASSIUM SERPL-SCNC: 3.3 MMOL/L (ref 3.5–5.1)
PROT SERPL-MCNC: 7.4 G/DL (ref 6.4–8.2)
PROT UR STRIP-MCNC: 30 MG/DL
RBC # BLD AUTO: 3.84 M/UL (ref 3.8–5.2)
RBC #/AREA URNS HPF: ABNORMAL /HPF (ref 0–5)
RBC MORPH BLD: ABNORMAL
SAMPLES BEING HELD,HOLD: NORMAL
SODIUM SERPL-SCNC: 140 MMOL/L (ref 136–145)
SP GR UR REFRACTOMETRY: 1.03 (ref 1–1.03)
UR CULT HOLD, URHOLD: NORMAL
UROBILINOGEN UR QL STRIP.AUTO: 1 EU/DL (ref 0.2–1)
WBC # BLD AUTO: 10.5 K/UL (ref 3.6–11)
WBC URNS QL MICRO: ABNORMAL /HPF (ref 0–4)

## 2021-04-08 PROCEDURE — 74011250636 HC RX REV CODE- 250/636: Performed by: EMERGENCY MEDICINE

## 2021-04-08 PROCEDURE — 87086 URINE CULTURE/COLONY COUNT: CPT

## 2021-04-08 PROCEDURE — 74011000258 HC RX REV CODE- 258: Performed by: EMERGENCY MEDICINE

## 2021-04-08 PROCEDURE — 81001 URINALYSIS AUTO W/SCOPE: CPT

## 2021-04-08 PROCEDURE — 83605 ASSAY OF LACTIC ACID: CPT

## 2021-04-08 RX ORDER — KETOROLAC TROMETHAMINE 30 MG/ML
30 INJECTION, SOLUTION INTRAMUSCULAR; INTRAVENOUS
Status: COMPLETED | OUTPATIENT
Start: 2021-04-08 | End: 2021-04-08

## 2021-04-08 RX ORDER — CEPHALEXIN 500 MG/1
500 CAPSULE ORAL 3 TIMES DAILY
Qty: 21 CAP | Refills: 0 | Status: SHIPPED | OUTPATIENT
Start: 2021-04-08 | End: 2021-04-15

## 2021-04-08 RX ADMIN — SODIUM CHLORIDE 1000 ML: 9 INJECTION, SOLUTION INTRAVENOUS at 01:52

## 2021-04-08 RX ADMIN — KETOROLAC TROMETHAMINE 30 MG: 30 INJECTION, SOLUTION INTRAMUSCULAR at 01:53

## 2021-04-08 RX ADMIN — CEFTRIAXONE SODIUM 1 G: 1 INJECTION, POWDER, FOR SOLUTION INTRAMUSCULAR; INTRAVENOUS at 01:55

## 2021-04-08 NOTE — ED TRIAGE NOTES
Triage:  Pt to the ED due to concerns over generalized muscle pain, and periods of nausea and vomiting. 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 Pt states has taking Ibuprofen with slight relief.

## 2021-04-08 NOTE — LETTER
Brian Macias was seen and treated in our emergency department on 4/7/2021. She may return to work on 4/9/21 If you have any questions or concerns, please don't hesitate to call. Dr. Rubin Cantor

## 2021-04-08 NOTE — ED PROVIDER NOTES
History of asthma. She presents with complaints of a 2-day history of muscle and joint pain. She describes pains in her bilateral shoulders, wrists, and knees. She also reports generalized muscle aches. She denies any known fever until she had 1 at arrival here. She had chills at work earlier. She denies cough and congestion. 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 Her mom states that she has had chronic issues keeping weight on. Past Medical History:   Diagnosis Date    Asthma     Second hand smoke exposure     Seizure [facility])     mother unsure what kind of seizure, last one was in 1st grade       No past surgical history on file. History reviewed. No pertinent family history.     Social History     Socioeconomic History    Marital status: SINGLE     Spouse name: Not on file    Number of children: Not on file    Years of education: Not on file    Highest education level: Not on file   Occupational History    Not on file   Social Needs    Financial resource strain: Not on file    Food insecurity     Worry: Not on file     Inability: Not on file    Transportation needs     Medical: Not on file     Non-medical: Not on file   Tobacco Use    Smoking status: Former Smoker     Packs/day: 0.25    Smokeless tobacco: Never Used   Substance and Sexual Activity    Alcohol use: No    Drug use: No    Sexual activity: Yes     Birth control/protection: Condom   Lifestyle    Physical activity     Days per week: Not on file     Minutes per session: Not on file    Stress: Not on file   Relationships    Social connections     Talks on phone: Not on file     Gets together: Not on file     Attends Faith service: Not on file     Active member of club or organization: Not on file     Attends meetings of clubs or organizations: Not on file     Relationship status: Not on file    Intimate partner violence     Fear of current or ex partner: Not on file Emotionally abused: Not on file     Physically abused: Not on file     Forced sexual activity: Not on file   Other Topics Concern    Not on file   Social History Narrative    Not on file         ALLERGIES: Amoxicillin    Review of Systems   All other systems reviewed and are negative. Vitals:    04/07/21 2333 04/08/21 0215   BP: 106/70 109/66   Pulse: (!) 115    Resp: 16    Temp: (!) 101.3 °F (38.5 °C)    SpO2: 98% 100%   Weight: 61.2 kg (135 lb)    Height: 5' 3\" (1.6 m)             Physical Exam  Vitals signs and nursing note reviewed. Constitutional:       Appearance: She is well-developed. Comments: Thin   HENT:      Head: Normocephalic and atraumatic. Eyes:      Conjunctiva/sclera: Conjunctivae normal.   Neck:      Musculoskeletal: Neck supple. Trachea: No tracheal deviation. Cardiovascular:      Rate and Rhythm: Normal rate and regular rhythm. Heart sounds: Normal heart sounds. No murmur. No friction rub. No gallop. Pulmonary:      Effort: Pulmonary effort is normal.      Breath sounds: Normal breath sounds. Abdominal:      Palpations: Abdomen is soft. Tenderness: There is no abdominal tenderness. Musculoskeletal:         General: No deformity. Comments: Mild bilateral wrist and knee tenderness. No swelling noted. No redness or warmth. Skin:     General: Skin is warm and dry. Neurological:      Mental Status: She is alert. Comments: oriented          MDM       Procedures    Progress Note:  Results, treatment, and follow up plan have been discussed with patient/mom. Questions were answered. Mary Sánchez MD  3:23 AM      Assessment/plan: 2-day history of joint and muscle pain. She was febrile to 1-1.4 upon arrival.  Initial tachycardia has resolved. Reassuring appearance/exam with stable vital signs. CBC remarkable for a normal white blood cell count but she does have 77% neutrophils and 8% bands which is somewhat concerning.   Her urine is contaminated but shows moderate leukocyte esterase,  white blood cells, and 1+ bacteria. She received Rocephin in the ED. CMP and lactate are unremarkable. CRP is 10.5. Home with Keflex and PCP follow-up. Return precautions discussed.   Mary Sánchez MD  3:25 AM

## 2021-04-09 LAB
BACTERIA SPEC CULT: NORMAL
SERVICE CMNT-IMP: NORMAL

## 2021-12-01 ENCOUNTER — HOSPITAL ENCOUNTER (EMERGENCY)
Age: 26
Discharge: LEFT BEFORE DISCHARGE | End: 2021-12-01

## 2021-12-01 PROCEDURE — 75810000275 HC EMERGENCY DEPT VISIT NO LEVEL OF CARE

## 2021-12-06 ENCOUNTER — HOSPITAL ENCOUNTER (EMERGENCY)
Age: 26
Discharge: HOME | End: 2021-12-06
Attending: EMERGENCY MEDICINE

## 2021-12-06 VITALS
BODY MASS INDEX: 18.96 KG/M2 | OXYGEN SATURATION: 99 % | RESPIRATION RATE: 18 BRPM | WEIGHT: 107 LBS | HEART RATE: 82 BPM | DIASTOLIC BLOOD PRESSURE: 83 MMHG | TEMPERATURE: 98.2 F | HEIGHT: 63 IN | SYSTOLIC BLOOD PRESSURE: 115 MMHG

## 2021-12-06 DIAGNOSIS — N61.1 ABSCESS OF RIGHT BREAST: Primary | ICD-10-CM

## 2021-12-06 PROCEDURE — 99282 EMERGENCY DEPT VISIT SF MDM: CPT

## 2021-12-06 RX ORDER — CLINDAMYCIN HYDROCHLORIDE 300 MG/1
300 CAPSULE ORAL 4 TIMES DAILY
Qty: 28 CAPSULE | Refills: 0 | Status: SHIPPED | OUTPATIENT
Start: 2021-12-06 | End: 2021-12-13

## 2021-12-07 NOTE — DISCHARGE INSTRUCTIONS
It was a pleasure taking care of you at [facility] Emergency Department today. We know that when you come to [facility], you are entrusting us with your health, comfort, and safety. Our physicians and nurses honor that trust, and we truly appreciate the opportunity to care for you and your loved ones. We also value our feedback. If you receive a survey about your Emergency Department experience today, please fill it out. We care about our patients' feedback, and we listen to what you have to say. Thank you!

## 2021-12-07 NOTE — ED PROVIDER NOTES
EMERGENCY DEPARTMENT HISTORY AND PHYSICAL EXAM      Date: 12/6/2021  Patient Name: Rafia Jackson    History of Presenting Illness     Chief Complaint   Patient presents with    Skin Problem     History Provided By: Patient    HPI: Rafia Jackson, 23 y.o. female with medical history significant for asthma, seizure disorder who presents via self to the ED with cc of acute moderate aching right breast rash X 1 week. No fever, chills, nausea, vomiting, numbness, tingling, focal weakness, nipple discoloration or drainage. No medications or modifying factors. PCP: None    There are no other complaints, changes, or physical findings at this time. No current facility-administered medications on file prior to encounter. No current outpatient medications on file prior to encounter. Past History     Past Medical History:  Past Medical History:   Diagnosis Date    Asthma     Second hand smoke exposure     Seizure [facility])     mother unsure what kind of seizure, last one was in 1st grade     Past Surgical History:  No past surgical history on file. Family History:  History reviewed. No pertinent family history. Social History:  Social History     Tobacco Use    Smoking status: Former Smoker     Packs/day: 0.25    Smokeless tobacco: Never Used   Substance Use Topics    Alcohol use: No    Drug use: No     Allergies: Allergies   Allergen Reactions    Amoxicillin Hives     Review of Systems   Review of Systems   Constitutional: Negative. Negative for activity change, chills and fever. HENT: Negative. Negative for congestion, ear pain, facial swelling and sore throat. Eyes: Negative. Negative for pain. Respiratory: Negative. Negative for cough and shortness of breath. Cardiovascular: Negative for chest pain. Gastrointestinal: Negative. Negative for abdominal pain, constipation, diarrhea, nausea and vomiting. Genitourinary: Negative. Negative for dysuria. Musculoskeletal: Negative. Negative for joint swelling. Skin: Positive for rash. Neurological: Negative for dizziness, light-headedness, numbness and headaches. Psychiatric/Behavioral: Negative. Physical Exam   Physical Exam  Vitals and nursing note reviewed. Exam conducted with a chaperone present. Constitutional:       General: She is not in acute distress. Appearance: Normal appearance. She is well-developed. She is not ill-appearing, toxic-appearing or diaphoretic. HENT:      Head: Normocephalic and atraumatic. Right Ear: Hearing and external ear normal.      Left Ear: Hearing and external ear normal.      Nose: Nose normal.   Eyes:      Conjunctiva/sclera: Conjunctivae normal.      Pupils: Pupils are equal, round, and reactive to light. Pulmonary:      Effort: Pulmonary effort is normal. No respiratory distress. Chest:      Chest wall: Tenderness present. No lacerations, deformity, swelling, crepitus or edema. Breasts:      Right: No swelling, bleeding, inverted nipple, mass, nipple discharge, axillary adenopathy or supraclavicular adenopathy. Left: No swelling, bleeding, inverted nipple, mass, nipple discharge, skin change, tenderness, axillary adenopathy or supraclavicular adenopathy. Musculoskeletal:         General: Normal range of motion. Cervical back: Normal range of motion. Lymphadenopathy:      Upper Body:      Right upper body: No supraclavicular, axillary or pectoral adenopathy. Left upper body: No supraclavicular, axillary or pectoral adenopathy. Skin:     General: Skin is warm and dry. Findings: Abscess present. Neurological:      Mental Status: She is alert and oriented to person, place, and time. Psychiatric:         Behavior: Behavior normal.         Thought Content: Thought content normal.         Judgment: Judgment normal.       Diagnostic Study Results   Labs -   No results found for this or any previous visit (from the past 12 hour(s)).     Radiologic Studies -   No orders to display     No results found. Medical Decision Making   I am the first provider for this patient. I reviewed the vital signs, available nursing notes, past medical history, past surgical history, family history and social history. Vital Signs-Reviewed the patient's vital signs. Patient Vitals for the past 24 hrs:   Temp Pulse Resp BP SpO2   12/06/21 1924 98.2 °F (36.8 °C) 82 18 115/83 99 %     Pulse Oximetry Analysis - 99% on RA (normal)    Records Reviewed: Nursing Notes, Old Medical Records, Previous Radiology Studies and Previous Laboratory Studies    Provider Notes (Medical Decision Making):   Patient presents with fluctuant warm painful lesion concerning for abscess. No signs of sepsis at this time. Will do I+D in ER, send home with antibiotics and give abscess management and prevention instructions. DDx: mastitis, abscess, cellulitis, mrsa, mssa. ED Course:   Initial assessment performed. The patients presenting problems have been discussed, and they are in agreement with the care plan formulated and outlined with them. I have encouraged them to ask questions as they arise throughout their visit. ED Course as of 12/06/21 2024   Mon Dec 06, 2021   2004 Pt declining I&D today. Requesting abx and warm compresses at home. Will follow up in 2 days if sxs have not improved, or sooner if new/worsening sxs. Strict ED precautions and red flags sxs advised. [SM]      ED Course User Index  [SM] Bogdan Hernandez PA-C       Progress Note:   Updated pt on all returned results and findings. Discussed the importance of proper follow up as referred below along with return precautions. Pt in agreement with the care plan and expresses agreement with and understanding of all items discussed. Disposition:  8:09 PM  I have discussed with patient their diagnosis, treatment, and follow up plan.  The patient agrees to follow up as outlined in discharge paperwork and also to return to the ED with any worsening. Elmira Cross PA-C      PLAN:  1. Current Discharge Medication List      START taking these medications    Details   clindamycin (CLEOCIN) 300 mg capsule Take 1 Capsule by mouth four (4) times daily for 7 days. Qty: 28 Capsule, Refills: 0  Start date: 12/6/2021, End date: 12/13/2021           2. Follow-up Information     Follow up With Specialties Details Why Josee Paul  Schedule an appointment as soon as possible for a visit in 2 days As needed, If symptoms worsen [facility]  [phone]    [facility] EMERGENCY DEPT Emergency Medicine Go to  As needed, If symptoms worsen [address] [facility]    Shayy Ma MD General Surgery, Breast Surgery, Oncology Schedule an appointment as soon as possible for a visit in 1 week As needed [address] [place] [address]  [address] [facility]  [phone]          Return to ED if worse     Diagnosis     Clinical Impression:   1. Abscess of right breast            Please note that this dictation was completed with Dragon, computer voice recognition software. Quite often unanticipated grammatical, syntax, homophones, and other interpretive errors are inadvertently transcribed by the computer software. Please disregard these errors. Additionally, please excuse any errors that have escaped final proofreading.

## 2021-12-07 NOTE — ED NOTES
Pt presents to ED ambulatory complaining of skin problem to right breast x 1 week. Pt is alert and oriented x 4, RR even and unlabored. Assessment completed and pt updated on plan of care. Call bell in reach. Emergency Department Nursing Plan of Care       The Nursing Plan of Care is developed from the Nursing assessment and Emergency Department Attending provider initial evaluation. The plan of care may be reviewed in the ED Provider note.     The Plan of Care was developed with the following considerations:   Patient / Family readiness to learn indicated by:verbalized understanding  Persons(s) to be included in education: patient  Barriers to Learning/Limitations:No    Signed     Yonatan Cee    12/6/2021   7:57 PM

## 2021-12-07 NOTE — ED NOTES
Discharge instructions were given to the patient by Magi Herrmann RN. The patient left the Emergency Department ambulatory, alert and oriented and in no acute distress with 1 prescription. The patient was encouraged to call or return to the ED for worsening issues or problems and was encouraged to schedule a follow up appointment for continuing care. The patient verbalized understanding of discharge instructions and prescriptions, all questions were answered. The patient has no further concerns at this time.

## 2021-12-21 ENCOUNTER — HOSPITAL ENCOUNTER (EMERGENCY)
Age: 26
Discharge: HOME | End: 2021-12-21
Attending: EMERGENCY MEDICINE

## 2021-12-21 VITALS
TEMPERATURE: 98.7 F | HEIGHT: 63 IN | HEART RATE: 106 BPM | OXYGEN SATURATION: 98 % | BODY MASS INDEX: 18.96 KG/M2 | RESPIRATION RATE: 18 BRPM | WEIGHT: 107 LBS | SYSTOLIC BLOOD PRESSURE: 98 MMHG | DIASTOLIC BLOOD PRESSURE: 63 MMHG

## 2021-12-21 DIAGNOSIS — N76.0 BV (BACTERIAL VAGINOSIS): Primary | ICD-10-CM

## 2021-12-21 DIAGNOSIS — S01.81XA FACIAL LACERATION, INITIAL ENCOUNTER: ICD-10-CM

## 2021-12-21 DIAGNOSIS — B96.89 BV (BACTERIAL VAGINOSIS): Primary | ICD-10-CM

## 2021-12-21 DIAGNOSIS — A59.9 TRICHIMONIASIS: ICD-10-CM

## 2021-12-21 LAB
ALBUMIN SERPL-MCNC: 4 G/DL (ref 3.5–5)
ALBUMIN/GLOB SERPL: 1 {RATIO} (ref 1.1–2.2)
ALP SERPL-CCNC: 73 U/L (ref 45–117)
ALT SERPL-CCNC: 23 U/L (ref 12–78)
ANION GAP SERPL CALC-SCNC: 7 MMOL/L (ref 5–15)
APPEARANCE UR: ABNORMAL
AST SERPL-CCNC: 21 U/L (ref 15–37)
BACTERIA URNS QL MICRO: ABNORMAL /HPF
BASOPHILS # BLD: 0 K/UL (ref 0–0.1)
BASOPHILS NFR BLD: 1 % (ref 0–1)
BILIRUB SERPL-MCNC: 0.3 MG/DL (ref 0.2–1)
BILIRUB UR QL CFM: NEGATIVE
BUN SERPL-MCNC: 10 MG/DL (ref 6–20)
BUN/CREAT SERPL: 11 (ref 12–20)
CALCIUM SERPL-MCNC: 8.8 MG/DL (ref 8.5–10.1)
CHLORIDE SERPL-SCNC: 101 MMOL/L (ref 97–108)
CLUE CELLS VAG QL WET PREP: NORMAL
CO2 SERPL-SCNC: 30 MMOL/L (ref 21–32)
COLOR UR: ABNORMAL
CREAT SERPL-MCNC: 0.89 MG/DL (ref 0.55–1.02)
DIFFERENTIAL METHOD BLD: ABNORMAL
EOSINOPHIL # BLD: 0 K/UL (ref 0–0.4)
EOSINOPHIL NFR BLD: 0 % (ref 0–7)
EPITH CASTS URNS QL MICRO: ABNORMAL /LPF
ERYTHROCYTE [DISTWIDTH] IN BLOOD BY AUTOMATED COUNT: 13.6 % (ref 11.5–14.5)
GLOBULIN SER CALC-MCNC: 3.9 G/DL (ref 2–4)
GLUCOSE SERPL-MCNC: 91 MG/DL (ref 65–100)
GLUCOSE UR STRIP.AUTO-MCNC: NEGATIVE MG/DL
HCG UR QL: NEGATIVE
HCT VFR BLD AUTO: 40.7 % (ref 35–47)
HGB BLD-MCNC: 13.4 G/DL (ref 11.5–16)
HGB UR QL STRIP: NEGATIVE
IMM GRANULOCYTES # BLD AUTO: 0 K/UL (ref 0–0.04)
IMM GRANULOCYTES NFR BLD AUTO: 1 % (ref 0–0.5)
KETONES UR QL STRIP.AUTO: ABNORMAL MG/DL
KOH PREP SPEC: NORMAL
LEUKOCYTE ESTERASE UR QL STRIP.AUTO: ABNORMAL
LYMPHOCYTES # BLD: 1.2 K/UL (ref 0.8–3.5)
LYMPHOCYTES NFR BLD: 33 % (ref 12–49)
MCH RBC QN AUTO: 30.2 PG (ref 26–34)
MCHC RBC AUTO-ENTMCNC: 32.9 G/DL (ref 30–36.5)
MCV RBC AUTO: 91.9 FL (ref 80–99)
MONOCYTES # BLD: 0.6 K/UL (ref 0–1)
MONOCYTES NFR BLD: 18 % (ref 5–13)
NEUTS SEG # BLD: 1.7 K/UL (ref 1.8–8)
NEUTS SEG NFR BLD: 47 % (ref 32–75)
NITRITE UR QL STRIP.AUTO: NEGATIVE
NRBC # BLD: 0 K/UL (ref 0–0.01)
NRBC BLD-RTO: 0 PER 100 WBC
PH UR STRIP: 6 [PH] (ref 5–8)
PLATELET # BLD AUTO: 184 K/UL (ref 150–400)
PMV BLD AUTO: 11.1 FL (ref 8.9–12.9)
POTASSIUM SERPL-SCNC: 3.7 MMOL/L (ref 3.5–5.1)
PROT SERPL-MCNC: 7.9 G/DL (ref 6.4–8.2)
PROT UR STRIP-MCNC: 100 MG/DL
RBC # BLD AUTO: 4.43 M/UL (ref 3.8–5.2)
RBC #/AREA URNS HPF: ABNORMAL /HPF (ref 0–5)
SERVICE CMNT-IMP: NORMAL
SODIUM SERPL-SCNC: 138 MMOL/L (ref 136–145)
SP GR UR REFRACTOMETRY: 1.03 (ref 1–1.03)
T VAGINALIS VAG QL WET PREP: NORMAL
TRICHOMONAS UR QL MICRO: PRESENT
UA: UC IF INDICATED,UAUC: ABNORMAL
UROBILINOGEN UR QL STRIP.AUTO: 0.2 EU/DL (ref 0.2–1)
WBC # BLD AUTO: 3.6 K/UL (ref 3.6–11)
WBC URNS QL MICRO: ABNORMAL /HPF (ref 0–4)

## 2021-12-21 PROCEDURE — 87210 SMEAR WET MOUNT SALINE/INK: CPT

## 2021-12-21 PROCEDURE — 96372 THER/PROPH/DIAG INJ SC/IM: CPT

## 2021-12-21 PROCEDURE — 99284 EMERGENCY DEPT VISIT MOD MDM: CPT

## 2021-12-21 PROCEDURE — 87491 CHLMYD TRACH DNA AMP PROBE: CPT

## 2021-12-21 PROCEDURE — 74011250636 HC RX REV CODE- 250/636: Performed by: NURSE PRACTITIONER

## 2021-12-21 PROCEDURE — 96361 HYDRATE IV INFUSION ADD-ON: CPT

## 2021-12-21 PROCEDURE — 96360 HYDRATION IV INFUSION INIT: CPT

## 2021-12-21 PROCEDURE — 81025 URINE PREGNANCY TEST: CPT

## 2021-12-21 PROCEDURE — 74011250637 HC RX REV CODE- 250/637: Performed by: NURSE PRACTITIONER

## 2021-12-21 PROCEDURE — 75810000215 HC WOUND REPAIR OTHER

## 2021-12-21 PROCEDURE — 81001 URINALYSIS AUTO W/SCOPE: CPT

## 2021-12-21 PROCEDURE — 36415 COLL VENOUS BLD VENIPUNCTURE: CPT

## 2021-12-21 PROCEDURE — 80053 COMPREHEN METABOLIC PANEL: CPT

## 2021-12-21 PROCEDURE — 85025 COMPLETE CBC W/AUTO DIFF WBC: CPT

## 2021-12-21 PROCEDURE — 74011000250 HC RX REV CODE- 250: Performed by: NURSE PRACTITIONER

## 2021-12-21 RX ORDER — ONDANSETRON 8 MG/1
8 TABLET, ORALLY DISINTEGRATING ORAL
Qty: 21 TABLET | Refills: 0 | Status: SHIPPED | OUTPATIENT
Start: 2021-12-21 | End: 2022-05-05

## 2021-12-21 RX ORDER — METRONIDAZOLE 500 MG/1
500 TABLET ORAL 2 TIMES DAILY
Qty: 14 TABLET | Refills: 0 | Status: SHIPPED | OUTPATIENT
Start: 2021-12-21 | End: 2021-12-28

## 2021-12-21 RX ORDER — LIDOCAINE HYDROCHLORIDE AND EPINEPHRINE 10; 10 MG/ML; UG/ML
1.5 INJECTION, SOLUTION INFILTRATION; PERINEURAL
Status: COMPLETED | OUTPATIENT
Start: 2021-12-21 | End: 2021-12-21

## 2021-12-21 RX ORDER — AZITHROMYCIN 500 MG/1
1000 TABLET, FILM COATED ORAL
Status: COMPLETED | OUTPATIENT
Start: 2021-12-21 | End: 2021-12-21

## 2021-12-21 RX ADMIN — LIDOCAINE HYDROCHLORIDE 500 MG: 10 INJECTION, SOLUTION EPIDURAL; INFILTRATION; INTRACAUDAL; PERINEURAL at 23:07

## 2021-12-21 RX ADMIN — SODIUM CHLORIDE 1000 ML: 9 INJECTION, SOLUTION INTRAVENOUS at 20:44

## 2021-12-21 RX ADMIN — LIDOCAINE HYDROCHLORIDE AND EPINEPHRINE 15 MG: 10; 10 INJECTION, SOLUTION INFILTRATION; PERINEURAL at 20:51

## 2021-12-21 RX ADMIN — AZITHROMYCIN MONOHYDRATE 1000 MG: 500 TABLET ORAL at 23:07

## 2021-12-21 RX ADMIN — Medication 2 ML: at 20:51

## 2021-12-21 RX ADMIN — BACITRACIN ZINC, NEOMYCIN, POLYMYXIN B 1 PACKET: 400; 3.5; 5 OINTMENT TOPICAL at 20:51

## 2021-12-21 NOTE — ED TRIAGE NOTES
Also reports one inch laceration under chin sustained while vomiting today. Bleeding controlled. Appears to need sutures.

## 2021-12-22 NOTE — DISCHARGE INSTRUCTIONS
It was a pleasure taking care of you at [facility] Emergency Department today. We know that when you come to [facility], you are entrusting us with your health, comfort, and safety. Our physicians and nurses honor that trust, and we truly appreciate the opportunity to care for you and your loved ones. We also value our feedback. If you receive a survey about your Emergency Department experience today, please fill it out. We care about our patients' feedback, and we listen to what you have to say. Thank you! Please fax the preop note along with copy of the EKG to the surgeon's office. Thanks.

## 2021-12-22 NOTE — ED NOTES
Discharge instructions were given to the patient by Melecio Pham RN. The patient left the Emergency Department ambulatory, alert and oriented and in no acute distress with 2 prescriptions. The patient was encouraged to call or return to the ED for worsening issues or problems and was encouraged to schedule a follow up appointment for continuing care. The patient verbalized understanding of discharge instructions and prescriptions, all questions were answered. The patient has no further concerns at this time.

## 2021-12-22 NOTE — ED NOTES
Pt arrives ambulatory with C/O headache & vomiting x Monday, and a laceration under her chin x PTA. Pt reports while at home she began to feel hot and lightheaded while vomiting so she got closer to the floor where it was cold. Pt states she passed out and when she woke up she noticed blood coming from her chin. Pt has a 0.5in laceration under chin that is not actively bleeding at this time. Pt states her nausea and headache are feeling better as of right now. Pt denies chest pain, SOB, fevers, cough, and urinary symptoms. Pt is alert and oriented x 4. RR even and unlabored. Pt updated on plan of care and has no questions or concerns at this time. Pt is in NAD at this time. Call bell is within reach. Emergency Department Nursing Plan of Care       The Nursing Plan of Care is developed from the Nursing assessment and Emergency Department Attending provider initial evaluation. The plan of care may be reviewed in the ED Provider note.     The Plan of Care was developed with the following considerations:   Patient / Family readiness to learn indicated by:verbalized understanding  Persons(s) to be included in education: patient  Barriers to Learning/Limitations:No    Signed     Cheo Cardona RN    12/21/2021   8:38 PM

## 2021-12-22 NOTE — ED PROVIDER NOTES
EMERGENCY DEPARTMENT HISTORY AND PHYSICAL EXAM      Date: 12/21/2021  Patient Name: Lorie Martin    History of Presenting Illness     Chief Complaint   Patient presents with    Vomiting    Headache    Laceration       History Provided By: Patient    Additional History (Context): Lorie Martin is a 23 y.o. female with  Asthma, Seizure who presents with vomiting. Onset 2 days ago. Reports last episode this morning. States she felt warm and nauseous prior to arrival. She attempted to lay down and then noticed she hit her chin. She sustained a laceration and reports bleeding controlled. Reports tetanus is UTD. Denies syncope or LOC. Denies CP, SOB, fever, chills. Reports headache. Denies abd pain, diarrhea. PCP: None    Current Facility-Administered Medications   Medication Dose Route Frequency Provider Last Rate Last Admin    cefTRIAXone (ROCEPHIN) 500 mg in lidocaine (PF) (XYLOCAINE) 10 mg/mL (1 %) IM injection  500 mg IntraMUSCular NOW Milka Baker, COSME        azithromycin (ZITHROMAX) tablet 1,000 mg  1,000 mg Oral NOW Milka Baker NP         Current Outpatient Medications   Medication Sig Dispense Refill    ondansetron (ZOFRAN ODT) 8 mg disintegrating tablet Take 1 Tablet by mouth every eight (8) hours as needed for Nausea or Vomiting. 21 Tablet 0    metroNIDAZOLE (FlagyL) 500 mg tablet Take 1 Tablet by mouth two (2) times a day for 7 days. 14 Tablet 0       Past History     Past Medical History:  Past Medical History:   Diagnosis Date    Asthma     Second hand smoke exposure     Seizure [facility])     mother unsure what kind of seizure, last one was in 1st grade       Past Surgical History:  History reviewed. No pertinent surgical history. Family History:  History reviewed. No pertinent family history.     Social History:  Social History     Tobacco Use    Smoking status: Former Smoker     Packs/day: 0.25    Smokeless tobacco: Never Used   Substance Use Topics    Alcohol use: No    Drug use: No       Allergies: Allergies   Allergen Reactions    Amoxicillin Hives         Review of Systems   Review of Systems   Constitutional: Negative for appetite change, chills, fatigue and fever. HENT: Negative for congestion, ear pain, rhinorrhea and sore throat. Eyes: Negative for pain and itching. Respiratory: Negative for cough, chest tightness, shortness of breath and wheezing. Cardiovascular: Negative for chest pain, palpitations and leg swelling. Gastrointestinal: Positive for nausea and vomiting. Negative for abdominal pain, constipation and diarrhea. Genitourinary: Negative for dysuria, frequency and urgency. Musculoskeletal: Positive for arthralgias. Negative for back pain and joint swelling. Skin: Positive for wound. Negative for color change and rash. Neurological: Positive for headaches. Negative for dizziness and numbness. All other systems reviewed and are negative. Physical Exam     Vitals:    12/21/21 1827   BP: 98/63   Pulse: (!) 106   Resp: 18   Temp: 98.7 °F (37.1 °C)   SpO2: 98%   Weight: 48.5 kg (107 lb)   Height: 5' 3\" (1.6 m)     Physical Exam  Vitals and nursing note reviewed. Constitutional:       General: She is not in acute distress. Appearance: She is well-developed. She is not ill-appearing. HENT:      Head: Normocephalic and atraumatic. Right Ear: Tympanic membrane and ear canal normal.      Left Ear: Tympanic membrane and ear canal normal.      Nose: Nose normal.      Mouth/Throat:      Mouth: Mucous membranes are moist.      Pharynx: Oropharynx is clear. No oropharyngeal exudate or posterior oropharyngeal erythema. Eyes:      Extraocular Movements: Extraocular movements intact. Conjunctiva/sclera: Conjunctivae normal.      Pupils: Pupils are equal, round, and reactive to light. Cardiovascular:      Rate and Rhythm: Normal rate and regular rhythm. Pulses: Normal pulses. Heart sounds: Normal heart sounds.    Pulmonary: Effort: Pulmonary effort is normal.      Breath sounds: Normal breath sounds. Abdominal:      General: Bowel sounds are normal.      Palpations: Abdomen is soft. Tenderness: There is no abdominal tenderness. There is no right CVA tenderness, left CVA tenderness, guarding or rebound. Musculoskeletal:      Cervical back: Normal range of motion and neck supple. Skin:     General: Skin is warm and dry. Findings: Laceration (2 cm jhorizontal to the chin ) present. Neurological:      Mental Status: She is alert and oriented to person, place, and time. GCS: GCS eye subscore is 4. GCS verbal subscore is 5. GCS motor subscore is 6. Cranial Nerves: Cranial nerves are intact. Sensory: Sensation is intact. Motor: Motor function is intact. Coordination: Coordination is intact. Gait: Gait is intact. Diagnostic Study Results     Labs -     Recent Results (from the past 12 hour(s))   METABOLIC PANEL, COMPREHENSIVE    Collection Time: 12/21/21  8:42 PM   Result Value Ref Range    Sodium 138 136 - 145 mmol/L    Potassium 3.7 3.5 - 5.1 mmol/L    Chloride 101 97 - 108 mmol/L    CO2 30 21 - 32 mmol/L    Anion gap 7 5 - 15 mmol/L    Glucose 91 65 - 100 mg/dL    BUN 10 6 - 20 MG/DL    Creatinine 0.89 0.55 - 1.02 MG/DL    BUN/Creatinine ratio 11 (L) 12 - 20      GFR est AA >60 >60 ml/min/1.73m2    GFR est non-AA >60 >60 ml/min/1.73m2    Calcium 8.8 8.5 - 10.1 MG/DL    Bilirubin, total 0.3 0.2 - 1.0 MG/DL    ALT (SGPT) 23 12 - 78 U/L    AST (SGOT) 21 15 - 37 U/L    Alk.  phosphatase 73 45 - 117 U/L    Protein, total 7.9 6.4 - 8.2 g/dL    Albumin 4.0 3.5 - 5.0 g/dL    Globulin 3.9 2.0 - 4.0 g/dL    A-G Ratio 1.0 (L) 1.1 - 2.2     CBC WITH AUTOMATED DIFF    Collection Time: 12/21/21  8:42 PM   Result Value Ref Range    WBC 3.6 3.6 - 11.0 K/uL    RBC 4.43 3.80 - 5.20 M/uL    HGB 13.4 11.5 - 16.0 g/dL    HCT 40.7 35.0 - 47.0 %    MCV 91.9 80.0 - 99.0 FL    MCH 30.2 26.0 - 34.0 PG    MCHC 32.9 30.0 - 36.5 g/dL    RDW 13.6 11.5 - 14.5 %    PLATELET 209 445 - 276 K/uL    MPV 11.1 8.9 - 12.9 FL    NRBC 0.0 0  WBC    ABSOLUTE NRBC 0.00 0.00 - 0.01 K/uL    NEUTROPHILS 47 32 - 75 %    LYMPHOCYTES 33 12 - 49 %    MONOCYTES 18 (H) 5 - 13 %    EOSINOPHILS 0 0 - 7 %    BASOPHILS 1 0 - 1 %    IMMATURE GRANULOCYTES 1 (H) 0.0 - 0.5 %    ABS. NEUTROPHILS 1.7 (L) 1.8 - 8.0 K/UL    ABS. LYMPHOCYTES 1.2 0.8 - 3.5 K/UL    ABS. MONOCYTES 0.6 0.0 - 1.0 K/UL    ABS. EOSINOPHILS 0.0 0.0 - 0.4 K/UL    ABS. BASOPHILS 0.0 0.0 - 0.1 K/UL    ABS. IMM. GRANS. 0.0 0.00 - 0.04 K/UL    DF AUTOMATED     URINALYSIS W/ REFLEX CULTURE    Collection Time: 12/21/21  8:42 PM    Specimen: Miscellaneous sample; Urine    Urine specimen   Result Value Ref Range    Color DARK YELLOW      Appearance CLOUDY (A) CLEAR      Specific gravity 1.030 1.003 - 1.030      pH (UA) 6.0 5.0 - 8.0      Protein 100 (A) NEG mg/dL    Glucose Negative NEG mg/dL    Ketone TRACE (A) NEG mg/dL    Blood Negative NEG      Urobilinogen 0.2 0.2 - 1.0 EU/dL    Nitrites Negative NEG      Leukocyte Esterase SMALL (A) NEG      WBC 5-10 0 - 4 /hpf    RBC 0-5 0 - 5 /hpf    Epithelial cells MANY (A) FEW /lpf    Bacteria 1+ (A) NEG /hpf    UA:UC IF INDICATED CULTURE NOT INDICATED BY UA RESULT CNI      Trichomonas PRESENT (A) NEG     BILIRUBIN, CONFIRM    Collection Time: 12/21/21  8:42 PM   Result Value Ref Range    Bilirubin UA, confirm Negative NEG     HCG URINE, QL. - POC    Collection Time: 12/21/21  8:55 PM   Result Value Ref Range    Pregnancy test,urine (POC) Negative NEG     WET PREP    Collection Time: 12/21/21 10:01 PM    Specimen: Miscellaneous sample   Result Value Ref Range    Clue cells CLUE CELLS PRESENT      Wet prep TRICHOMONAS PRESENT     KOH, OTHER SOURCES    Collection Time: 12/21/21 10:01 PM    Specimen: Vagina;  Other   Result Value Ref Range    Special Requests: NO SPECIAL REQUESTS      KOH NO YEAST SEEN         Radiologic Studies -   No orders to display     CT Results  (Last 48 hours)    None        CXR Results  (Last 48 hours)    None            Medical Decision Making   I am the first provider for this patient. I reviewed the vital signs, available nursing notes, past medical history, past surgical history, family history and social history. Vital Signs-Reviewed the patient's vital signs. Records Reviewed: Nursing Notes, Old Medical Records, Previous Radiology Studies and Previous Laboratory Studies         ED COURSE:   Initial assessment performed. The patients presenting problems have been discussed, and they are in agreement with the care plan formulated and outlined with them. I have encouraged them to ask questions as they arise throughout their visit. 22 yo F presents for vomiting exhibiting no tenderness or guarding on exam. Pt is neurologically benign. There is a laceration to the chin but no other visible injury to the face. Plan to obtain labs in addition will rehydrate IVF. DDX: Gastritis, Gastroenteritis, UTI, Dehydration, laceration, abrasion     ED Course:   ED Course as of 12/21/21 2232   Tue Dec 21, 2021   2143 Progress Note:   Pt reports feeling better after IVF and medication. UA + for trichomoniasis. Pt is requesting for additional testing and empiric treatment. [NA]      ED Course User Index  [NA] Milka Baker NP         Disposition:  Discharge   DISCHARGE NOTE:     Pt has been reexamined. Patient has no new complaints, changes, or physical findings. Care plan outlined and precautions discussed. All of pt's questions and concerns were addressed. Patient was instructed and agrees to follow up with PCP, as well as to return to the ED upon further deterioration. Patient is ready to go home.     Follow-up Information     Follow up With Specialties Details Why [address] EMERGENCY DEPT Emergency Medicine  For suture removal in 5 days Calista Hernandez          Current Discharge Medication List      START taking these medications    Details   ondansetron (ZOFRAN ODT) 8 mg disintegrating tablet Take 1 Tablet by mouth every eight (8) hours as needed for Nausea or Vomiting. Qty: 21 Tablet, Refills: 0  Start date: 12/21/2021      metroNIDAZOLE (FlagyL) 500 mg tablet Take 1 Tablet by mouth two (2) times a day for 7 days. Qty: 14 Tablet, Refills: 0  Start date: 12/21/2021, End date: 12/28/2021             Provider Notes (Medical Decision Making):     Procedures:  Wound Repair    Date/Time: 12/21/2021 8:06 PM  Performed by: NPPreparation: skin prepped with Betadine  Location details: face (chin)  Wound length:2.5 cm or less  Anesthesia: local infiltration    Anesthesia:  Anesthetic total: 3 mL  Foreign bodies: no foreign bodies  Irrigation solution: saline  Irrigation method: syringe  Number of sutures: 5  Technique: simple  Approximation: close  Dressing: antibiotic ointment  Patient tolerance: patient tolerated the procedure well with no immediate complications  My total time at bedside, performing this procedure was 31-45 minutes. Diagnosis     Clinical Impression:   1. BV (bacterial vaginosis)    2. Facial laceration, initial encounter    3.  Trichimoniasis

## 2021-12-24 LAB
C TRACH RRNA SPEC QL NAA+PROBE: NEGATIVE
N GONORRHOEA RRNA SPEC QL NAA+PROBE: NEGATIVE
SPECIMEN SOURCE: NORMAL

## 2022-05-05 ENCOUNTER — HOSPITAL ENCOUNTER (EMERGENCY)
Age: 27
Discharge: HOME | End: 2022-05-06
Attending: EMERGENCY MEDICINE
Payer: MEDICAID

## 2022-05-05 VITALS
BODY MASS INDEX: 19.96 KG/M2 | HEART RATE: 75 BPM | SYSTOLIC BLOOD PRESSURE: 117 MMHG | HEIGHT: 62 IN | DIASTOLIC BLOOD PRESSURE: 78 MMHG | OXYGEN SATURATION: 100 % | WEIGHT: 108.5 LBS | RESPIRATION RATE: 16 BRPM | TEMPERATURE: 99.7 F

## 2022-05-05 DIAGNOSIS — F17.200 TOBACCO DEPENDENCE: ICD-10-CM

## 2022-05-05 DIAGNOSIS — R11.2 NAUSEA AND VOMITING, UNSPECIFIED VOMITING TYPE: Primary | ICD-10-CM

## 2022-05-05 DIAGNOSIS — R19.7 DIARRHEA, UNSPECIFIED TYPE: ICD-10-CM

## 2022-05-05 LAB
ALBUMIN SERPL-MCNC: 3.9 G/DL (ref 3.5–5)
ALBUMIN/GLOB SERPL: 1.1 {RATIO} (ref 1.1–2.2)
ALP SERPL-CCNC: 83 U/L (ref 45–117)
ALT SERPL-CCNC: 30 U/L (ref 12–78)
ANION GAP SERPL CALC-SCNC: 11 MMOL/L (ref 5–15)
APPEARANCE UR: ABNORMAL
AST SERPL-CCNC: 22 U/L (ref 15–37)
BACTERIA URNS QL MICRO: NEGATIVE /HPF
BASOPHILS # BLD: 0 K/UL (ref 0–0.1)
BASOPHILS NFR BLD: 0 % (ref 0–1)
BILIRUB SERPL-MCNC: 0.9 MG/DL (ref 0.2–1)
BILIRUB UR QL: NEGATIVE
BUN SERPL-MCNC: 10 MG/DL (ref 6–20)
BUN/CREAT SERPL: 15 (ref 12–20)
CALCIUM SERPL-MCNC: 8.7 MG/DL (ref 8.5–10.1)
CHLORIDE SERPL-SCNC: 100 MMOL/L (ref 97–108)
CO2 SERPL-SCNC: 25 MMOL/L (ref 21–32)
COLOR UR: ABNORMAL
CREAT SERPL-MCNC: 0.66 MG/DL (ref 0.55–1.02)
DIFFERENTIAL METHOD BLD: ABNORMAL
EOSINOPHIL # BLD: 0 K/UL (ref 0–0.4)
EOSINOPHIL NFR BLD: 0 % (ref 0–7)
EPITH CASTS URNS QL MICRO: ABNORMAL /LPF
ERYTHROCYTE [DISTWIDTH] IN BLOOD BY AUTOMATED COUNT: 13 % (ref 11.5–14.5)
FLUAV RNA SPEC QL NAA+PROBE: NOT DETECTED
FLUBV RNA SPEC QL NAA+PROBE: NOT DETECTED
GLOBULIN SER CALC-MCNC: 3.6 G/DL (ref 2–4)
GLUCOSE SERPL-MCNC: 91 MG/DL (ref 65–100)
GLUCOSE UR STRIP.AUTO-MCNC: NEGATIVE MG/DL
HCT VFR BLD AUTO: 36.8 % (ref 35–47)
HGB BLD-MCNC: 12.3 G/DL (ref 11.5–16)
HGB UR QL STRIP: NEGATIVE
IMM GRANULOCYTES # BLD AUTO: 0 K/UL (ref 0–0.04)
IMM GRANULOCYTES NFR BLD AUTO: 0 % (ref 0–0.5)
KETONES UR QL STRIP.AUTO: 15 MG/DL
LEUKOCYTE ESTERASE UR QL STRIP.AUTO: ABNORMAL
LIPASE SERPL-CCNC: 27 U/L (ref 73–393)
LYMPHOCYTES # BLD: 0.9 K/UL (ref 0.8–3.5)
LYMPHOCYTES NFR BLD: 15 % (ref 12–49)
MCH RBC QN AUTO: 29.9 PG (ref 26–34)
MCHC RBC AUTO-ENTMCNC: 33.4 G/DL (ref 30–36.5)
MCV RBC AUTO: 89.3 FL (ref 80–99)
MONOCYTES # BLD: 0.5 K/UL (ref 0–1)
MONOCYTES NFR BLD: 8 % (ref 5–13)
NEUTS SEG # BLD: 4.4 K/UL (ref 1.8–8)
NEUTS SEG NFR BLD: 77 % (ref 32–75)
NITRITE UR QL STRIP.AUTO: NEGATIVE
NRBC # BLD: 0 K/UL (ref 0–0.01)
NRBC BLD-RTO: 0 PER 100 WBC
PH UR STRIP: 7 [PH] (ref 5–8)
PLATELET # BLD AUTO: 193 K/UL (ref 150–400)
PMV BLD AUTO: 11 FL (ref 8.9–12.9)
POTASSIUM SERPL-SCNC: 3.6 MMOL/L (ref 3.5–5.1)
PROT SERPL-MCNC: 7.5 G/DL (ref 6.4–8.2)
PROT UR STRIP-MCNC: ABNORMAL MG/DL
RBC # BLD AUTO: 4.12 M/UL (ref 3.8–5.2)
RBC #/AREA URNS HPF: ABNORMAL /HPF (ref 0–5)
SARS-COV-2, COV2: NOT DETECTED
SODIUM SERPL-SCNC: 136 MMOL/L (ref 136–145)
SP GR UR REFRACTOMETRY: 1.01 (ref 1–1.03)
UA: UC IF INDICATED,UAUC: ABNORMAL
UROBILINOGEN UR QL STRIP.AUTO: 0.2 EU/DL (ref 0.2–1)
WBC # BLD AUTO: 5.7 K/UL (ref 3.6–11)
WBC URNS QL MICRO: ABNORMAL /HPF (ref 0–4)

## 2022-05-05 PROCEDURE — 80053 COMPREHEN METABOLIC PANEL: CPT

## 2022-05-05 PROCEDURE — 87636 SARSCOV2 & INF A&B AMP PRB: CPT

## 2022-05-05 PROCEDURE — 81025 URINE PREGNANCY TEST: CPT

## 2022-05-05 PROCEDURE — 81001 URINALYSIS AUTO W/SCOPE: CPT

## 2022-05-05 PROCEDURE — 85025 COMPLETE CBC W/AUTO DIFF WBC: CPT

## 2022-05-05 PROCEDURE — 96374 THER/PROPH/DIAG INJ IV PUSH: CPT

## 2022-05-05 PROCEDURE — 36415 COLL VENOUS BLD VENIPUNCTURE: CPT

## 2022-05-05 PROCEDURE — 99284 EMERGENCY DEPT VISIT MOD MDM: CPT

## 2022-05-05 PROCEDURE — 83690 ASSAY OF LIPASE: CPT

## 2022-05-05 PROCEDURE — 74011250636 HC RX REV CODE- 250/636: Performed by: PHYSICIAN ASSISTANT

## 2022-05-05 RX ORDER — ONDANSETRON 4 MG/1
4 TABLET, ORALLY DISINTEGRATING ORAL
Qty: 15 TABLET | Refills: 0 | Status: SHIPPED | OUTPATIENT
Start: 2022-05-05

## 2022-05-05 RX ORDER — ONDANSETRON 2 MG/ML
4 INJECTION INTRAMUSCULAR; INTRAVENOUS
Status: COMPLETED | OUTPATIENT
Start: 2022-05-05 | End: 2022-05-05

## 2022-05-05 RX ORDER — DICYCLOMINE HYDROCHLORIDE 20 MG/1
20 TABLET ORAL EVERY 6 HOURS
Qty: 15 TABLET | Refills: 0 | Status: SHIPPED | OUTPATIENT
Start: 2022-05-05 | End: 2022-05-09

## 2022-05-05 RX ADMIN — ONDANSETRON 4 MG: 2 INJECTION INTRAMUSCULAR; INTRAVENOUS at 22:11

## 2022-05-05 RX ADMIN — SODIUM CHLORIDE 1000 ML: 9 INJECTION, SOLUTION INTRAVENOUS at 22:11

## 2022-05-05 NOTE — Clinical Note
[facility] EMERGENCY DEPT  [address] [address] [phone]    Work/School Note    Date: 5/5/2022    To Whom It May concern:    Emma Armenta was seen and treated today in the emergency room by the following provider(s):  Attending Provider: Loreli Runner, MD  Physician Assistant: Jose Grande is excused from work/school on 5/5/2022 through 5/7/2022. She is medically clear to return to work/school on 5/8/2022.          Sincerely,          [place]

## 2022-05-06 LAB — HCG UR QL: NEGATIVE

## 2022-05-06 NOTE — ED TRIAGE NOTES
Pt reports vomiting onset this morning, states that her legs are aching and states diarrhea today as well

## 2022-05-06 NOTE — DISCHARGE INSTRUCTIONS
Rest, push fluids, alternate Tylenol and Motrin for fever, and follow up with PCP for recheck. Avoid any and all tobacco products. Liquid diet x 24 hours, then gradually advance as tolerated. Return to the emergency department for any worsening fever, cough, chest pain, shortness of breath, decreased oral intake, or decreased urine output.

## 2022-05-06 NOTE — ED NOTES
Discharge instructions were given to the patient by Jhony Moran RN. The patient left the Emergency Department ambulatory, alert and oriented and in no acute distress with 2 prescriptions. The patient was encouraged to call or return to the ED for worsening issues or problems and was encouraged to schedule a follow up appointment for continuing care. The patient verbalized understanding of discharge instructions and prescriptions, all questions were answered. The patient has no further concerns at this time.

## 2022-05-06 NOTE — ED PROVIDER NOTES
EMERGENCY DEPARTMENT HISTORY AND PHYSICAL EXAM      Date: 5/5/2022  Patient Name: Emma Armenta    History of Presenting Illness     Chief Complaint   Patient presents with    Vomiting       History Provided By: Patient    HPI: Emma Armenta, 33 y.o. female presents ambulatory to the Emergency Dept with c/o abrupt onset N/V and watery diarrhea upon awakening this am. No BRBPR/melena. She denied known ill contacts saying \"I don't really go anywhere since Jose Alexadnra. \" she denied fever/chills. She has had similar sx in the past. Diana Pressley never figure out what's wrong. \" She states she's been told it may be viral. She does not currently have a PCP. She denied pelvic pain, bleeding, or discharge. She denied concern for pregnancy. No dysuria/hematuria. She relates her abdominal discomfort to repeated vomiting and diarrhea. She denied recent alcohol use. No personal or family h/o chronic abdominal issues. She is a smoker. She rates her discomfort a 7/10 on the pain scale and describes it as crampy ache. Pt is o/w healthy without cough, congestion, ST, shortness of breath, chest pain, rash/lesion. There are no other complaints, changes, or physical findings at this time. PCP: None    Current Outpatient Medications   Medication Sig Dispense Refill    dicyclomine (BENTYL) 20 mg tablet Take 1 Tablet by mouth every six (6) hours for 15 doses. 15 Tablet 0    ondansetron (ZOFRAN ODT) 4 mg disintegrating tablet Take 1 Tablet by mouth every eight (8) hours as needed for Nausea or Vomiting for up to 15 doses. 15 Tablet 0       Past History     Past Medical History:  Past Medical History:   Diagnosis Date    Asthma     Second hand smoke exposure     Seizure [facility])     mother unsure what kind of seizure, last one was in 1st grade       Past Surgical History:  History reviewed. No pertinent surgical history. Family History:  History reviewed. No pertinent family history.     Social History:  Social History     Tobacco Use    Smoking status: Current Some Day Smoker     Packs/day: 0.25    Smokeless tobacco: Never Used    Tobacco comment: black and troy   Substance Use Topics    Alcohol use: No    Drug use: Yes     Types: Marijuana       Allergies: Allergies   Allergen Reactions    Amoxicillin Hives         Review of Systems   Review of Systems   Constitutional: Positive for appetite change. Negative for chills and fever. HENT: Negative for congestion, rhinorrhea and sore throat. Respiratory: Negative for cough and shortness of breath. Cardiovascular: Negative for chest pain and palpitations. Gastrointestinal: Positive for abdominal pain, diarrhea, nausea and vomiting. Negative for abdominal distention, blood in stool and constipation. Endocrine: Negative for polydipsia, polyphagia and polyuria. Genitourinary: Negative for decreased urine volume, difficulty urinating, dysuria, flank pain, hematuria, pelvic pain, urgency, vaginal bleeding, vaginal discharge and vaginal pain. Musculoskeletal: Negative for back pain, neck pain and neck stiffness. Skin: Negative for rash and wound. Allergic/Immunologic: Negative for food allergies and immunocompromised state. Neurological: Negative for dizziness and headaches. Hematological: Negative for adenopathy. Does not bruise/bleed easily. Psychiatric/Behavioral: Negative for agitation and confusion. All other systems reviewed and are negative. Physical Exam   Physical Exam  Vitals and nursing note reviewed. Constitutional:       General: She is not in acute distress. Appearance: Normal appearance. She is well-developed and normal weight. She is not ill-appearing, toxic-appearing or diaphoretic. HENT:      Head: Normocephalic and atraumatic. Nose: Nose normal. No congestion or rhinorrhea. Mouth/Throat:      Mouth: Mucous membranes are dry. Pharynx: Oropharynx is clear. Eyes:      General: No scleral icterus. Right eye: No discharge. Left eye: No discharge. Extraocular Movements: Extraocular movements intact. Conjunctiva/sclera: Conjunctivae normal.      Pupils: Pupils are equal, round, and reactive to light. Neck:      Thyroid: No thyromegaly. Vascular: No JVD. Trachea: No tracheal deviation. Cardiovascular:      Rate and Rhythm: Normal rate and regular rhythm. Pulses: Normal pulses. Heart sounds: Normal heart sounds. Pulmonary:      Effort: Pulmonary effort is normal. No respiratory distress. Breath sounds: Normal breath sounds. No wheezing. Abdominal:      General: Abdomen is flat. There is no distension. Palpations: Abdomen is soft. Tenderness: There is no abdominal tenderness. There is no right CVA tenderness, left CVA tenderness, guarding or rebound. Musculoskeletal:         General: No deformity. Normal range of motion. Cervical back: Normal range of motion and neck supple. Right lower leg: No edema. Left lower leg: No edema. Skin:     General: Skin is warm and dry. Coloration: Skin is not pale. Findings: No erythema or rash. Neurological:      General: No focal deficit present. Mental Status: She is alert and oriented to person, place, and time. Motor: No weakness or abnormal muscle tone.       Coordination: Coordination normal.   Psychiatric:         Mood and Affect: Mood normal.         Behavior: Behavior normal.         Judgment: Judgment normal.         Diagnostic Study Results     Labs -     Recent Results (from the past 12 hour(s))   URINALYSIS W/ REFLEX CULTURE    Collection Time: 05/05/22  9:17 PM    Specimen: Urine   Result Value Ref Range    Color YELLOW/STRAW      Appearance CLOUDY (A) CLEAR      Specific gravity 1.015 1.003 - 1.030      pH (UA) 7.0 5.0 - 8.0      Protein TRACE (A) NEG mg/dL    Glucose Negative NEG mg/dL    Ketone 15 (A) NEG mg/dL    Bilirubin Negative NEG      Blood Negative NEG      Urobilinogen 0.2 0.2 - 1.0 EU/dL    Nitrites Negative NEG      Leukocyte Esterase TRACE (A) NEG      WBC 0-4 0 - 4 /hpf    RBC 0-5 0 - 5 /hpf    Epithelial cells FEW FEW /lpf    Bacteria Negative NEG /hpf    UA:UC IF INDICATED CULTURE NOT INDICATED BY UA RESULT CNI     CBC WITH AUTOMATED DIFF    Collection Time: 05/05/22 10:10 PM   Result Value Ref Range    WBC 5.7 3.6 - 11.0 K/uL    RBC 4.12 3.80 - 5.20 M/uL    HGB 12.3 11.5 - 16.0 g/dL    HCT 36.8 35.0 - 47.0 %    MCV 89.3 80.0 - 99.0 FL    MCH 29.9 26.0 - 34.0 PG    MCHC 33.4 30.0 - 36.5 g/dL    RDW 13.0 11.5 - 14.5 %    PLATELET 167 273 - 027 K/uL    MPV 11.0 8.9 - 12.9 FL    NRBC 0.0 0  WBC    ABSOLUTE NRBC 0.00 0.00 - 0.01 K/uL    NEUTROPHILS 77 (H) 32 - 75 %    LYMPHOCYTES 15 12 - 49 %    MONOCYTES 8 5 - 13 %    EOSINOPHILS 0 0 - 7 %    BASOPHILS 0 0 - 1 %    IMMATURE GRANULOCYTES 0 0.0 - 0.5 %    ABS. NEUTROPHILS 4.4 1.8 - 8.0 K/UL    ABS. LYMPHOCYTES 0.9 0.8 - 3.5 K/UL    ABS. MONOCYTES 0.5 0.0 - 1.0 K/UL    ABS. EOSINOPHILS 0.0 0.0 - 0.4 K/UL    ABS. BASOPHILS 0.0 0.0 - 0.1 K/UL    ABS. IMM. GRANS. 0.0 0.00 - 0.04 K/UL    DF AUTOMATED     METABOLIC PANEL, COMPREHENSIVE    Collection Time: 05/05/22 10:10 PM   Result Value Ref Range    Sodium 136 136 - 145 mmol/L    Potassium 3.6 3.5 - 5.1 mmol/L    Chloride 100 97 - 108 mmol/L    CO2 25 21 - 32 mmol/L    Anion gap 11 5 - 15 mmol/L    Glucose 91 65 - 100 mg/dL    BUN 10 6 - 20 MG/DL    Creatinine 0.66 0.55 - 1.02 MG/DL    BUN/Creatinine ratio 15 12 - 20      GFR est AA >60 >60 ml/min/1.73m2    GFR est non-AA >60 >60 ml/min/1.73m2    Calcium 8.7 8.5 - 10.1 MG/DL    Bilirubin, total 0.9 0.2 - 1.0 MG/DL    ALT (SGPT) 30 12 - 78 U/L    AST (SGOT) 22 15 - 37 U/L    Alk.  phosphatase 83 45 - 117 U/L    Protein, total 7.5 6.4 - 8.2 g/dL    Albumin 3.9 3.5 - 5.0 g/dL    Globulin 3.6 2.0 - 4.0 g/dL    A-G Ratio 1.1 1.1 - 2.2     LIPASE    Collection Time: 05/05/22 10:10 PM   Result Value Ref Range    Lipase 27 (L) 73 - 393 U/L   COVID-19 WITH INFLUENZA A/B    Collection Time: 05/05/22 10:10 PM   Result Value Ref Range    SARS-CoV-2 by PCR Not detected NOTD      Influenza A by PCR Not detected      Influenza B by PCR Not detected         Radiologic Studies -   No orders to display         Medical Decision Making   I am the first provider for this patient. I reviewed the vital signs, available nursing notes, past medical history, past surgical history, family history and social history. Vital Signs-Reviewed the patient's vital signs. Patient Vitals for the past 12 hrs:   Temp Pulse Resp BP SpO2   05/05/22 2106 99.7 °F (37.6 °C) 75 16 117/78 100 %           Records Reviewed: Nursing Notes, Old Medical Records, Previous Radiology Studies and Previous Laboratory Studies    Provider Notes (Medical Decision Making):   UTI, gastroenteritis, dehydration, electrolyte dysfunction    ED Course:   Initial assessment performed. The patients presenting problems have been discussed, and they are in agreement with the care plan formulated and outlined with them. I have encouraged them to ask questions as they arise throughout their visit. TOBACCO CESSATION COUNSELING  The patient was counseled on the dangers of tobacco use, and was advised to quit. Reviewed strategies to maximize success, including written materials. DISCHARGE NOTE:  The care plan has been outline with the patient and/or family, who verbally conveyed understanding and agreement. Available results have been reviewed. Patient and/or family understand the follow up plan as outlined and discharge instructions. Should their condition deterioration at any time after discharge the patient agrees to return, follow up sooner than outlined or seek medical assistance at the closest Emergency Room as soon as possible. Questions have been answered.  Discharge instructions and educational information regarding the patient's diagnosis as well a list of reasons why the patient would want to seek immediate medical attention, should their condition change, were reviewed directly with the patient/family          PLAN:  1. Current Discharge Medication List      START taking these medications    Details   dicyclomine (BENTYL) 20 mg tablet Take 1 Tablet by mouth every six (6) hours for 15 doses. Qty: 15 Tablet, Refills: 0  Start date: 5/5/2022, End date: 5/9/2022      ondansetron (ZOFRAN ODT) 4 mg disintegrating tablet Take 1 Tablet by mouth every eight (8) hours as needed for Nausea or Vomiting for up to 15 doses. Qty: 15 Tablet, Refills: 0  Start date: 5/5/2022           2. Follow-up Information     Follow up With Specialties Details Why Shy Moon  [facility], [address] [facility] [facility] [address]  [phone]    [address] [place] [address] EMERGENCY DEPT Emergency Medicine  If symptoms worsen [place]  [phone]        Return to ED if worse     Diagnosis     Clinical Impression:   1. Nausea and vomiting, unspecified vomiting type    2. Diarrhea, unspecified type    3.  Tobacco dependence

## 2022-05-06 NOTE — ED NOTES
Pt presents to ED ambulatory complaining of N/V/D and mild abd pain x today. Pt also reporting bilateral leg aches. Pt is alert and oriented x 4, RR even and unlabored, skin is warm and dry. Assessment completed and pt updated on plan of care. Call bell in reach. Emergency Department Nursing Plan of Care       The Nursing Plan of Care is developed from the Nursing assessment and Emergency Department Attending provider initial evaluation. The plan of care may be reviewed in the ED Provider note.     The Plan of Care was developed with the following considerations:   Patient / Family readiness to learn indicated by:verbalized understanding  Persons(s) to be included in education: patient  Barriers to Learning/Limitations:No    Signed     Dawn Grajeda    5/5/2022   9:54 PM

## 2022-05-16 VITALS
HEART RATE: 70 BPM | DIASTOLIC BLOOD PRESSURE: 82 MMHG | RESPIRATION RATE: 16 BRPM | TEMPERATURE: 98.2 F | SYSTOLIC BLOOD PRESSURE: 132 MMHG | OXYGEN SATURATION: 99 %

## 2022-05-16 PROCEDURE — 99283 EMERGENCY DEPT VISIT LOW MDM: CPT

## 2022-05-16 PROCEDURE — 90791 PSYCH DIAGNOSTIC EVALUATION: CPT

## 2022-05-17 ENCOUNTER — HOSPITAL ENCOUNTER (EMERGENCY)
Age: 27
Discharge: HOME | End: 2022-05-17
Attending: EMERGENCY MEDICINE | Admitting: EMERGENCY MEDICINE
Payer: MEDICAID

## 2022-05-17 DIAGNOSIS — R45.4 OUTBURSTS OF ANGER: Primary | ICD-10-CM

## 2022-05-17 DIAGNOSIS — G47.00 INSOMNIA, UNSPECIFIED TYPE: ICD-10-CM

## 2022-05-17 DIAGNOSIS — F12.90 MARIJUANA USER: ICD-10-CM

## 2022-05-17 PROCEDURE — 74011250637 HC RX REV CODE- 250/637: Performed by: PHYSICIAN ASSISTANT

## 2022-05-17 RX ORDER — HYDROXYZINE 50 MG/1
50 TABLET, FILM COATED ORAL
Qty: 6 TABLET | Refills: 0 | Status: SHIPPED | OUTPATIENT
Start: 2022-05-17

## 2022-05-17 RX ORDER — HYDROXYZINE 25 MG/1
50 TABLET, FILM COATED ORAL
Status: COMPLETED | OUTPATIENT
Start: 2022-05-17 | End: 2022-05-17

## 2022-05-17 RX ADMIN — HYDROXYZINE HYDROCHLORIDE 50 MG: 25 TABLET ORAL at 01:28
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
## 2022-05-17 NOTE — BSMART NOTE
BSMART assessment completed, and suicide risk level noted to be no Risk. Primary Nurse Raul Del Cid and Charge Nurse Juan Carlos Silva and Physician Ramakrishna Lopez notified. Concerns not observed. Security/Off- has not been notified.

## 2022-05-17 NOTE — ED PROVIDER NOTES
60-year-old female presents with mother, for evaluation of increased anger outbursts. Patient has been having anger outbursts with friends and family over the past few weeks. Patient denies suicidal or homicidal thoughts, self harmful thoughts. Mother agrees there has been no threats or concerns for safety however states that patient has been calling friends and family and \"cursing them out randomly and is more aggressive at times. \"  Mom is requesting a mental health evaluation. No history of mental health problems per mom or patient. Patient has no complaints currently. Mom states she also has not been sleeping well. Patient endorses eating 3 mushrooms on 5/14. She admits to smoking marijuana occasionally but not since 5/14. She denies other drug use. Past Medical History:   Diagnosis Date    Asthma     Second hand smoke exposure     Seizure [facility])     mother unsure what kind of seizure, last one was in 1st grade       History reviewed. No pertinent surgical history. No family history on file.     Social History     Socioeconomic History    Marital status: SINGLE     Spouse name: Not on file    Number of children: Not on file    Years of education: Not on file    Highest education level: Not on file   Occupational History    Not on file   Tobacco Use    Smoking status: Former Smoker     Packs/day: 0.25    Smokeless tobacco: Never Used    Tobacco comment: black and milds   Substance and Sexual Activity    Alcohol use: No    Drug use: Yes     Types: Marijuana    Sexual activity: Yes     Birth control/protection: Condom   Other Topics Concern    Not on file   Social History Narrative    Not on file     Social Determinants of Health     Financial Resource Strain:     Difficulty of Paying Living Expenses: Not on file   Food Insecurity:     Worried About Running Out of Food in the Last Year: Not on file    Emily of Food in the Last Year: Not on file   Transportation Needs:  Lack of Transportation (Medical): Not on file    Lack of Transportation (Non-Medical): Not on file   Physical Activity:     Days of Exercise per Week: Not on file    Minutes of Exercise per Session: Not on file   Stress:     Feeling of Stress : Not on file   Social Connections:     Frequency of Communication with Friends and Family: Not on file    Frequency of Social Gatherings with Friends and Family: Not on file    Attends Caodaism Services: Not on file    Active Member of [address] [place] [address] or Organizations: Not on file    Attends Club or Organization Meetings: Not on file    Marital Status: Not on file   Intimate Partner Violence:     Fear of Current or Ex-Partner: Not on file    Emotionally Abused: Not on file    Physically Abused: Not on file    Sexually Abused: Not on file   Housing Stability:     Unable to Pay for Housing in the Last Year: Not on file    Number of [place] in the Last Year: Not on file    Unstable Housing in the Last Year: Not on file         ALLERGIES: Amoxicillin    Review of Systems   Constitutional: Negative. Negative for activity change, chills, fatigue and unexpected weight change. HENT: Negative for trouble swallowing. Respiratory: Negative for cough, chest tightness, shortness of breath and wheezing. Cardiovascular: Negative. Negative for chest pain and palpitations. Gastrointestinal: Negative. Negative for abdominal pain, diarrhea, nausea and vomiting. Genitourinary: Negative. Negative for dysuria, flank pain, frequency and hematuria. Musculoskeletal: Negative. Negative for arthralgias, back pain, neck pain and neck stiffness. Skin: Negative. Negative for color change and rash. Neurological: Negative. Negative for dizziness, numbness and headaches. Psychiatric/Behavioral: Positive for agitation. The patient is nervous/anxious. All other systems reviewed and are negative.       Vitals:    05/16/22 2150   BP: 132/82   Pulse: 70   Resp: 16 Temp: 98.2 °F (36.8 °C)   SpO2: 99%            Physical Exam  Vitals and nursing note reviewed. Constitutional:       General: She is not in acute distress. Appearance: She is well-developed. She is not toxic-appearing or diaphoretic. HENT:      Head: Normocephalic and atraumatic. Eyes:      General:         Right eye: No discharge. Left eye: No discharge. Conjunctiva/sclera: Conjunctivae normal.      Pupils: Pupils are equal, round, and reactive to light. Neck:      Trachea: No tracheal tenderness. Cardiovascular:      Rate and Rhythm: Normal rate and regular rhythm. Pulses: Normal pulses. Heart sounds: Normal heart sounds. No murmur heard. No friction rub. No gallop. Pulmonary:      Effort: Pulmonary effort is normal. No respiratory distress. Breath sounds: Normal breath sounds. No wheezing or rales. Chest:      Chest wall: No tenderness. Abdominal:      General: Bowel sounds are normal. There is no distension. Palpations: Abdomen is soft. Tenderness: There is no abdominal tenderness. There is no guarding or rebound. Musculoskeletal:         General: No tenderness. Normal range of motion. Cervical back: Full passive range of motion without pain and normal range of motion. Skin:     General: Skin is warm and dry. Capillary Refill: Capillary refill takes less than 2 seconds. Findings: No abrasion, erythema or rash. Neurological:      General: No focal deficit present. Mental Status: She is alert and oriented to person, place, and time. Cranial Nerves: No cranial nerve deficit. Sensory: No sensory deficit.       Coordination: Coordination normal.   Psychiatric:         Speech: Speech normal.         Behavior: Behavior normal.          MDM  Number of Diagnoses or Management Options  Outbursts of anger  Diagnosis management comments:   Ddx: agitation, SI, HI       Amount and/or Complexity of Data Reviewed  Clinical lab tests: ordered and reviewed  Tests in the radiology section of CPT®: ordered and reviewed  Review and summarize past medical records: yes  Discuss the patient with other providers: yes    Patient Progress  Patient progress: stable         Procedures    I discussed patient's PMH, exam findings as well as careplan with the ER attending who agrees with care plan. Robert Alfred PA-C    Saw and evaluated patient. Patient does not meet inpatient criteria. No SI, HI, self-harmful thoughts. Patient asking for something to help with her nerves / help her sleep. On no meds, never been on anxiolytics. Will Rx hydroxyzine and RBHA information provided by [facility] to contact later today when they open during regular business hours. Return precautions discussed with patient and mother. DISCHARGE NOTE:  The patient has been re-evaluated and feeling much better and are stable for discharge. All available radiology and laboratory results have been reviewed with patient and/or available family. Patient and/or family verbally conveyed their understanding and agreement of the patient's signs, symptoms, diagnosis, treatment and prognosis and additionally agree to follow-up as recommended in the discharge instructions or to return to the Emergency Department should their condition change or worsen prior to their follow-up appointment. All questions have been answered and patient and/or available family express understanding. MEDICATIONS GIVEN:  Medications   hydrOXYzine HCL (ATARAX) tablet 50 mg (50 mg Oral Given 5/17/22 0128)       IMPRESSION:  1. Outbursts of anger    2. Insomnia, unspecified type    3.  Marijuana user        PLAN:  Follow-up Information     Follow up With Specialties Details Why [address] behavioral health authority [facility])    Address: [address] [place] [address], [address] [facility]  [phone]        Discharge Medication List as of 5/17/2022  1:13 AM      START taking these medications    Details hydrOXYzine HCL (ATARAX) 50 mg tablet Take 1 Tablet by mouth every six (6) hours as needed for Anxiety. , Print, Disp-6 Tablet, R-0         CONTINUE these medications which have NOT CHANGED    Details   ondansetron (ZOFRAN ODT) 4 mg disintegrating tablet Take 1 Tablet by mouth every eight (8) hours as needed for Nausea or Vomiting for up to 15 doses. , Normal, Disp-15 Tablet, R-0

## 2022-05-17 NOTE — ED TRIAGE NOTES
She says she ate mushrooms a week ago this past week. She says since then she has had trouble controlling her emotions, and has not been able to sleep and having generalized pain.

## 2023-07-23 ENCOUNTER — APPOINTMENT (OUTPATIENT)
Facility: HOSPITAL | Age: 28
End: 2023-07-23

## 2023-07-23 ENCOUNTER — HOSPITAL ENCOUNTER (EMERGENCY)
Facility: HOSPITAL | Age: 28
Discharge: HOME | End: 2023-07-23
Attending: STUDENT IN AN ORGANIZED HEALTH CARE EDUCATION/TRAINING PROGRAM

## 2023-07-23 VITALS
SYSTOLIC BLOOD PRESSURE: 116 MMHG | DIASTOLIC BLOOD PRESSURE: 75 MMHG | WEIGHT: 100.31 LBS | RESPIRATION RATE: 20 BRPM | OXYGEN SATURATION: 100 % | HEART RATE: 64 BPM | HEIGHT: 62 IN | BODY MASS INDEX: 18.46 KG/M2 | TEMPERATURE: 97.6 F

## 2023-07-23 DIAGNOSIS — R19.7 DIARRHEA, UNSPECIFIED TYPE: Primary | ICD-10-CM

## 2023-07-23 DIAGNOSIS — K52.9 COLITIS: ICD-10-CM

## 2023-07-23 DIAGNOSIS — R11.0 NAUSEA: ICD-10-CM

## 2023-07-23 LAB
ALBUMIN SERPL-MCNC: 4.2 G/DL (ref 3.5–5)
ALBUMIN/GLOB SERPL: 1.1 (ref 1.1–2.2)
ALP SERPL-CCNC: 79 U/L (ref 45–117)
ALT SERPL-CCNC: 19 U/L (ref 12–78)
ANION GAP SERPL CALC-SCNC: 4 MMOL/L (ref 5–15)
APPEARANCE UR: CLEAR
AST SERPL-CCNC: 16 U/L (ref 15–37)
BACTERIA URNS QL MICRO: NEGATIVE /HPF
BASOPHILS # BLD: 0 K/UL (ref 0–0.1)
BASOPHILS NFR BLD: 1 % (ref 0–1)
BILIRUB SERPL-MCNC: 0.6 MG/DL (ref 0.2–1)
BILIRUB UR QL: NEGATIVE
BUN SERPL-MCNC: 10 MG/DL (ref 6–20)
BUN/CREAT SERPL: 12 (ref 12–20)
CALCIUM SERPL-MCNC: 8.9 MG/DL (ref 8.5–10.1)
CHLORIDE SERPL-SCNC: 108 MMOL/L (ref 97–108)
CO2 SERPL-SCNC: 26 MMOL/L (ref 21–32)
COLOR UR: ABNORMAL
COMMENT:: NORMAL
CREAT SERPL-MCNC: 0.81 MG/DL (ref 0.55–1.02)
DIFFERENTIAL METHOD BLD: NORMAL
EOSINOPHIL # BLD: 0.1 K/UL (ref 0–0.4)
EOSINOPHIL NFR BLD: 2 % (ref 0–7)
EPITH CASTS URNS QL MICRO: ABNORMAL /LPF
ERYTHROCYTE [DISTWIDTH] IN BLOOD BY AUTOMATED COUNT: 13.2 % (ref 11.5–14.5)
GLOBULIN SER CALC-MCNC: 4 G/DL (ref 2–4)
GLUCOSE SERPL-MCNC: 108 MG/DL (ref 65–100)
GLUCOSE UR STRIP.AUTO-MCNC: NEGATIVE MG/DL
HCG UR QL: NEGATIVE
HCT VFR BLD AUTO: 38.6 % (ref 35–47)
HGB BLD-MCNC: 12.6 G/DL (ref 11.5–16)
HGB UR QL STRIP: ABNORMAL
IMM GRANULOCYTES # BLD AUTO: 0 K/UL (ref 0–0.04)
IMM GRANULOCYTES NFR BLD AUTO: 0 % (ref 0–0.5)
KETONES UR QL STRIP.AUTO: 15 MG/DL
LEUKOCYTE ESTERASE UR QL STRIP.AUTO: NEGATIVE
LIPASE SERPL-CCNC: 44 U/L (ref 73–393)
LYMPHOCYTES # BLD: 1.5 K/UL (ref 0.8–3.5)
LYMPHOCYTES NFR BLD: 27 % (ref 12–49)
MCH RBC QN AUTO: 29.4 PG (ref 26–34)
MCHC RBC AUTO-ENTMCNC: 32.6 G/DL (ref 30–36.5)
MCV RBC AUTO: 90.2 FL (ref 80–99)
MONOCYTES # BLD: 0.4 K/UL (ref 0–1)
MONOCYTES NFR BLD: 7 % (ref 5–13)
MUCOUS THREADS URNS QL MICRO: ABNORMAL /LPF
NEUTS SEG # BLD: 3.6 K/UL (ref 1.8–8)
NEUTS SEG NFR BLD: 63 % (ref 32–75)
NITRITE UR QL STRIP.AUTO: NEGATIVE
NRBC # BLD: 0 K/UL (ref 0–0.01)
NRBC BLD-RTO: 0 PER 100 WBC
PH UR STRIP: 6 (ref 5–8)
PLATELET # BLD AUTO: 221 K/UL (ref 150–400)
PMV BLD AUTO: 10.9 FL (ref 8.9–12.9)
POTASSIUM SERPL-SCNC: 3.2 MMOL/L (ref 3.5–5.1)
PROT SERPL-MCNC: 8.2 G/DL (ref 6.4–8.2)
PROT UR STRIP-MCNC: 30 MG/DL
RBC # BLD AUTO: 4.28 M/UL (ref 3.8–5.2)
RBC #/AREA URNS HPF: ABNORMAL /HPF (ref 0–5)
SODIUM SERPL-SCNC: 138 MMOL/L (ref 136–145)
SP GR UR REFRACTOMETRY: 1.03 (ref 1–1.03)
SPECIMEN HOLD: NORMAL
SPECIMEN HOLD: NORMAL
UROBILINOGEN UR QL STRIP.AUTO: 1 EU/DL (ref 0.2–1)
WBC # BLD AUTO: 5.6 K/UL (ref 3.6–11)
WBC URNS QL MICRO: ABNORMAL /HPF (ref 0–4)

## 2023-07-23 PROCEDURE — 81001 URINALYSIS AUTO W/SCOPE: CPT

## 2023-07-23 PROCEDURE — 74177 CT ABD & PELVIS W/CONTRAST: CPT

## 2023-07-23 PROCEDURE — 81025 URINE PREGNANCY TEST: CPT

## 2023-07-23 PROCEDURE — 83690 ASSAY OF LIPASE: CPT

## 2023-07-23 PROCEDURE — 85025 COMPLETE CBC W/AUTO DIFF WBC: CPT

## 2023-07-23 PROCEDURE — 36415 COLL VENOUS BLD VENIPUNCTURE: CPT

## 2023-07-23 PROCEDURE — 99285 EMERGENCY DEPT VISIT HI MDM: CPT

## 2023-07-23 PROCEDURE — 80053 COMPREHEN METABOLIC PANEL: CPT

## 2023-07-23 PROCEDURE — 2580000003 HC RX 258: Performed by: STUDENT IN AN ORGANIZED HEALTH CARE EDUCATION/TRAINING PROGRAM

## 2023-07-23 PROCEDURE — 96361 HYDRATE IV INFUSION ADD-ON: CPT

## 2023-07-23 PROCEDURE — 96374 THER/PROPH/DIAG INJ IV PUSH: CPT

## 2023-07-23 PROCEDURE — 96375 TX/PRO/DX INJ NEW DRUG ADDON: CPT

## 2023-07-23 PROCEDURE — 6360000002 HC RX W HCPCS: Performed by: STUDENT IN AN ORGANIZED HEALTH CARE EDUCATION/TRAINING PROGRAM

## 2023-07-23 PROCEDURE — 6360000004 HC RX CONTRAST MEDICATION: Performed by: RADIOLOGY

## 2023-07-23 RX ORDER — 0.9 % SODIUM CHLORIDE 0.9 %
1000 INTRAVENOUS SOLUTION INTRAVENOUS ONCE
Status: COMPLETED | OUTPATIENT
Start: 2023-07-23 | End: 2023-07-23

## 2023-07-23 RX ORDER — MORPHINE SULFATE 4 MG/ML
4 INJECTION, SOLUTION INTRAMUSCULAR; INTRAVENOUS ONCE
Status: DISCONTINUED | OUTPATIENT
Start: 2023-07-23 | End: 2023-07-23

## 2023-07-23 RX ORDER — ONDANSETRON 2 MG/ML
4 INJECTION INTRAMUSCULAR; INTRAVENOUS ONCE
Status: COMPLETED | OUTPATIENT
Start: 2023-07-23 | End: 2023-07-23

## 2023-07-23 RX ORDER — ONDANSETRON 4 MG/1
4 TABLET, ORALLY DISINTEGRATING ORAL 3 TIMES DAILY PRN
Qty: 15 TABLET | Refills: 0 | Status: SHIPPED | OUTPATIENT
Start: 2023-07-23 | End: 2023-07-28

## 2023-07-23 RX ORDER — DICYCLOMINE HCL 20 MG
20 TABLET ORAL EVERY 6 HOURS PRN
Qty: 20 TABLET | Refills: 0 | Status: SHIPPED | OUTPATIENT
Start: 2023-07-23

## 2023-07-23 RX ORDER — KETOROLAC TROMETHAMINE 30 MG/ML
30 INJECTION, SOLUTION INTRAMUSCULAR; INTRAVENOUS
Status: COMPLETED | OUTPATIENT
Start: 2023-07-23 | End: 2023-07-23

## 2023-07-23 RX ADMIN — IOPAMIDOL 100 ML: 755 INJECTION, SOLUTION INTRAVENOUS at 10:41

## 2023-07-23 RX ADMIN — ONDANSETRON 4 MG: 2 INJECTION INTRAMUSCULAR; INTRAVENOUS at 09:53

## 2023-07-23 RX ADMIN — KETOROLAC TROMETHAMINE 30 MG: 30 INJECTION, SOLUTION INTRAMUSCULAR; INTRAVENOUS at 10:47

## 2023-07-23 RX ADMIN — SODIUM CHLORIDE 1000 ML: 9 INJECTION, SOLUTION INTRAVENOUS at 09:52

## 2023-07-23 ASSESSMENT — ENCOUNTER SYMPTOMS
DIARRHEA: 1
NAUSEA: 1
EYE REDNESS: 0
SHORTNESS OF BREATH: 0
COUGH: 0
ABDOMINAL PAIN: 1
EYE PAIN: 0
VOMITING: 0

## 2023-07-23 ASSESSMENT — PAIN DESCRIPTION - DESCRIPTORS
DESCRIPTORS: SHOOTING
DESCRIPTORS: SHARP

## 2023-07-23 ASSESSMENT — PAIN DESCRIPTION - ORIENTATION
ORIENTATION: LOWER;RIGHT
ORIENTATION: LOWER;RIGHT

## 2023-07-23 ASSESSMENT — PAIN DESCRIPTION - LOCATION
LOCATION: ABDOMEN
LOCATION: ABDOMEN

## 2023-07-23 ASSESSMENT — PAIN SCALES - GENERAL
PAINLEVEL_OUTOF10: 10
PAINLEVEL_OUTOF10: 6
PAINLEVEL_OUTOF10: 3

## 2023-07-23 ASSESSMENT — PAIN - FUNCTIONAL ASSESSMENT
PAIN_FUNCTIONAL_ASSESSMENT: 0-10
PAIN_FUNCTIONAL_ASSESSMENT: ACTIVITIES ARE NOT PREVENTED

## 2023-07-23 NOTE — ED TRIAGE NOTES
Pt arrives to ED from home via [facility] with c/o RLQ abdominal pain with Nausea and Diarrhea onset last night. Pt reports \"I think I started my period, too\". Denies fever, dysuria, Vomiting.

## 2023-07-23 NOTE — ED NOTES
Patient discharged home by Dr. Kal Guerrero. Patient verbalized understanding of discharge instructions.       Elvia Meeks RN  07/23/23 7322

## 2023-07-23 NOTE — ED PROVIDER NOTES
EP catheter inserted at the right atrium. [facility] EMERGENCY DEP  EMERGENCY DEPARTMENT ENCOUNTER      Pt Name: Chico Zarco  MRN: [id number]  [address] [facility] 1995  Date of evaluation: 7/23/2023  Provider: Pedrito Lopez       Chief Complaint   Patient presents with    Abdominal Pain         HISTORY OF PRESENT ILLNESS   (Location/Symptom, Timing/Onset, Context/Setting, Quality, Duration, Modifying Factors, Severity)  Note limiting factors. Patient is a 72-year-old female history of asthma and seizure disorder presenting today secondary to abdominal pain and diarrhea. Symptoms started this morning. Reports she had sudden onset of abdominal cramping in the periumbilical and epigastric region with several episodes of nonbloody diarrhea. Has had nausea but no vomiting. Started on her menstrual period today. Denies any vaginal discharge or pelvic pain prior to this. Does report recent treatment for BV with her gynecologist.  Has had generalized body aches but no fevers. No medications taken prior to arrival.          Review of External Medical Records:     Nursing Notes were reviewed. REVIEW OF SYSTEMS    (2-9 systems for level 4, 10 or more for level 5)     Review of Systems   Constitutional:  Positive for fatigue. Negative for fever. HENT:  Negative for congestion. Eyes:  Negative for pain and redness. Respiratory:  Negative for cough and shortness of breath. Gastrointestinal:  Positive for abdominal pain, diarrhea and nausea. Negative for vomiting. Genitourinary:  Negative for dysuria and vaginal bleeding. Musculoskeletal:  Positive for myalgias. Negative for arthralgias. Skin:  Negative for rash and wound. Neurological:  Negative for dizziness and headaches. All other systems reviewed and are negative. Except as noted above the remainder of the review of systems was reviewed and negative.        PAST MEDICAL HISTORY     Past Medical History:   Diagnosis Date    Asthma     Second hand smoke

## 2023-07-23 NOTE — DISCHARGE INSTRUCTIONS
Labs not indicative of pancreatitis, biliary obstructive process, hepatitis, pyelonephritis or UTI. CT did not show perforation, abscess, appendicitis, diverticulitis, sbo, or other acute intra-abdominal pathology.

## 2023-09-03 VITALS
HEIGHT: 62 IN | SYSTOLIC BLOOD PRESSURE: 132 MMHG | BODY MASS INDEX: 19.03 KG/M2 | OXYGEN SATURATION: 100 % | HEART RATE: 79 BPM | RESPIRATION RATE: 16 BRPM | TEMPERATURE: 98.1 F | DIASTOLIC BLOOD PRESSURE: 89 MMHG | WEIGHT: 103.4 LBS

## 2023-09-03 PROCEDURE — 99283 EMERGENCY DEPT VISIT LOW MDM: CPT

## 2023-09-03 RX ORDER — TRIAMCINOLONE ACETONIDE 0.25 MG/G
OINTMENT TOPICAL
Qty: 15 G | Refills: 1 | Status: SHIPPED | OUTPATIENT
Start: 2023-09-03 | End: 2023-09-10

## 2023-09-03 RX ORDER — DIPHENHYDRAMINE HCL 25 MG
25 CAPSULE ORAL EVERY 6 HOURS PRN
Qty: 25 CAPSULE | Refills: 0 | Status: SHIPPED | OUTPATIENT
Start: 2023-09-03 | End: 2023-09-13

## 2023-09-03 ASSESSMENT — PAIN DESCRIPTION - LOCATION: LOCATION: GENERALIZED

## 2023-09-03 ASSESSMENT — PAIN - FUNCTIONAL ASSESSMENT: PAIN_FUNCTIONAL_ASSESSMENT: 0-10

## 2023-09-03 ASSESSMENT — PAIN SCALES - GENERAL: PAINLEVEL_OUTOF10: 5

## 2023-09-04 ENCOUNTER — HOSPITAL ENCOUNTER (EMERGENCY)
Facility: HOSPITAL | Age: 28
Discharge: HOME | End: 2023-09-04
Attending: EMERGENCY MEDICINE
Payer: COMMERCIAL

## 2023-09-04 DIAGNOSIS — L24.9 IRRITANT CONTACT DERMATITIS, UNSPECIFIED TRIGGER: Primary | ICD-10-CM

## 2023-09-04 ASSESSMENT — ENCOUNTER SYMPTOMS
EYE DISCHARGE: 0
SHORTNESS OF BREATH: 0
ABDOMINAL PAIN: 0

## 2023-09-04 NOTE — ED TRIAGE NOTES
Patient ambulatory through triage with complaints of painful, itching, dry skin. She has hx of eczema while in the second grade which she outgrew. Then a few days ago she noted the dry/itchy skin on her abd and it has progressively spread to her whole body. Denies use of steroid creams at this time.

## 2023-09-04 NOTE — ED NOTES
Pt was brought back to room in ED by provider. However, it seems the pt has left the dept before treatment complete. Several staff members have looked throughout the dept for the pt unsuccessfully.       Carmina Grant RN  09/04/23 2043

## 2023-10-07 ENCOUNTER — HOSPITAL ENCOUNTER (EMERGENCY)
Facility: HOSPITAL | Age: 28
Discharge: HOME | End: 2023-10-07
Attending: STUDENT IN AN ORGANIZED HEALTH CARE EDUCATION/TRAINING PROGRAM
Payer: COMMERCIAL

## 2023-10-07 VITALS
OXYGEN SATURATION: 99 % | HEART RATE: 66 BPM | HEIGHT: 62 IN | SYSTOLIC BLOOD PRESSURE: 132 MMHG | DIASTOLIC BLOOD PRESSURE: 80 MMHG | WEIGHT: 110 LBS | RESPIRATION RATE: 18 BRPM | TEMPERATURE: 98.2 F | BODY MASS INDEX: 20.24 KG/M2

## 2023-10-07 DIAGNOSIS — K21.9 GASTROESOPHAGEAL REFLUX DISEASE WITHOUT ESOPHAGITIS: Primary | ICD-10-CM

## 2023-10-07 PROCEDURE — 99283 EMERGENCY DEPT VISIT LOW MDM: CPT

## 2023-10-07 RX ORDER — PANTOPRAZOLE SODIUM 20 MG/1
20 TABLET, DELAYED RELEASE ORAL DAILY
Qty: 30 TABLET | Refills: 0 | Status: SHIPPED | OUTPATIENT
Start: 2023-10-07

## 2023-10-07 NOTE — ED TRIAGE NOTES
Pt arrived to ED via POV with CC of nasal congestion and constipation     Pt states she took a laxative and did have a bowel movement today.

## 2023-10-07 NOTE — ED NOTES
4825 - Patient discharged by [facility] MD - pt sent to the front lobby, with strong and steady gait, no acute distress noted at time of discharge - Discharge information / home RX / and reasons to return to the ED were reviewed by the ED provider. Pt's family member at side for comfort care and for a ride home.        Lula Apley, RN  10/07/23 0310

## 2023-11-07 ENCOUNTER — HOSPITAL ENCOUNTER (EMERGENCY)
Facility: HOSPITAL | Age: 28
Discharge: HOME | End: 2023-11-07
Attending: EMERGENCY MEDICINE
Payer: COMMERCIAL

## 2023-11-07 VITALS
WEIGHT: 100.31 LBS | BODY MASS INDEX: 18.35 KG/M2 | SYSTOLIC BLOOD PRESSURE: 130 MMHG | HEART RATE: 71 BPM | DIASTOLIC BLOOD PRESSURE: 91 MMHG | RESPIRATION RATE: 17 BRPM | OXYGEN SATURATION: 98 % | TEMPERATURE: 97.9 F

## 2023-11-07 DIAGNOSIS — Z76.0 ENCOUNTER FOR MEDICATION REFILL: Primary | ICD-10-CM

## 2023-11-07 PROCEDURE — 99282 EMERGENCY DEPT VISIT SF MDM: CPT

## 2023-11-07 ASSESSMENT — ENCOUNTER SYMPTOMS
EYE DISCHARGE: 0
SHORTNESS OF BREATH: 0
ABDOMINAL PAIN: 0

## 2023-11-08 NOTE — ED NOTES

## 2023-11-08 NOTE — ED TRIAGE NOTES
Pt comes in ambulatory with cc of needing a medication refill of bently and atarax. Pt was here a few weeks ago with dx of a viral infection. Pt states she still feels feverish and is wondering if she needs a refill of medication or continue taking them. Unable to assess screening questions. Pt stated \"I am going to see my PCP. \"

## 2023-12-06 ENCOUNTER — HOSPITAL ENCOUNTER (EMERGENCY)
Facility: HOSPITAL | Age: 28
Discharge: HOME | End: 2023-12-07
Attending: STUDENT IN AN ORGANIZED HEALTH CARE EDUCATION/TRAINING PROGRAM
Payer: COMMERCIAL

## 2023-12-06 VITALS
TEMPERATURE: 98.2 F | RESPIRATION RATE: 18 BRPM | DIASTOLIC BLOOD PRESSURE: 60 MMHG | OXYGEN SATURATION: 100 % | SYSTOLIC BLOOD PRESSURE: 102 MMHG | HEART RATE: 60 BPM

## 2023-12-06 DIAGNOSIS — K59.00 CONSTIPATION, UNSPECIFIED CONSTIPATION TYPE: Primary | ICD-10-CM

## 2023-12-06 DIAGNOSIS — N92.6 IRREGULAR MENSTRUAL CYCLE: ICD-10-CM

## 2023-12-06 LAB
ALBUMIN SERPL-MCNC: 4.7 G/DL (ref 3.5–5)
ALBUMIN/GLOB SERPL: 1 (ref 1.1–2.2)
ALP SERPL-CCNC: 90 U/L (ref 45–117)
ALT SERPL-CCNC: 14 U/L (ref 12–78)
AMPHET UR QL SCN: NEGATIVE
ANION GAP SERPL CALC-SCNC: 5 MMOL/L (ref 5–15)
APPEARANCE UR: CLEAR
AST SERPL-CCNC: 14 U/L (ref 15–37)
BACTERIA URNS QL MICRO: NEGATIVE /HPF
BARBITURATES UR QL SCN: NEGATIVE
BASOPHILS # BLD: 0 K/UL (ref 0–0.1)
BASOPHILS NFR BLD: 1 % (ref 0–1)
BENZODIAZ UR QL: NEGATIVE
BILIRUB SERPL-MCNC: 0.6 MG/DL (ref 0.2–1)
BILIRUB UR QL: NEGATIVE
BUN SERPL-MCNC: 11 MG/DL (ref 6–20)
BUN/CREAT SERPL: 14 (ref 12–20)
CALCIUM SERPL-MCNC: 9.8 MG/DL (ref 8.5–10.1)
CANNABINOIDS UR QL SCN: POSITIVE
CHLORIDE SERPL-SCNC: 104 MMOL/L (ref 97–108)
CO2 SERPL-SCNC: 29 MMOL/L (ref 21–32)
COCAINE UR QL SCN: NEGATIVE
COLOR UR: ABNORMAL
COMMENT:: NORMAL
CREAT SERPL-MCNC: 0.78 MG/DL (ref 0.55–1.02)
DIFFERENTIAL METHOD BLD: NORMAL
EOSINOPHIL # BLD: 0 K/UL (ref 0–0.4)
EOSINOPHIL NFR BLD: 0 % (ref 0–7)
EPITH CASTS URNS QL MICRO: ABNORMAL /LPF
ERYTHROCYTE [DISTWIDTH] IN BLOOD BY AUTOMATED COUNT: 12.4 % (ref 11.5–14.5)
GLOBULIN SER CALC-MCNC: 4.6 G/DL (ref 2–4)
GLUCOSE SERPL-MCNC: 91 MG/DL (ref 65–100)
GLUCOSE UR STRIP.AUTO-MCNC: NEGATIVE MG/DL
HCG UR QL: NEGATIVE
HCT VFR BLD AUTO: 37.6 % (ref 35–47)
HGB BLD-MCNC: 12.8 G/DL (ref 11.5–16)
HGB UR QL STRIP: ABNORMAL
IMM GRANULOCYTES # BLD AUTO: 0 K/UL (ref 0–0.04)
IMM GRANULOCYTES NFR BLD AUTO: 0 % (ref 0–0.5)
KETONES UR QL STRIP.AUTO: >80 MG/DL
LEUKOCYTE ESTERASE UR QL STRIP.AUTO: NEGATIVE
LIPASE SERPL-CCNC: 14 U/L (ref 13–75)
LYMPHOCYTES # BLD: 1.1 K/UL (ref 0.8–3.5)
LYMPHOCYTES NFR BLD: 22 % (ref 12–49)
Lab: ABNORMAL
MCH RBC QN AUTO: 29.2 PG (ref 26–34)
MCHC RBC AUTO-ENTMCNC: 34 G/DL (ref 30–36.5)
MCV RBC AUTO: 85.6 FL (ref 80–99)
METHADONE UR QL: NEGATIVE
MONOCYTES # BLD: 0.4 K/UL (ref 0–1)
MONOCYTES NFR BLD: 7 % (ref 5–13)
MUCOUS THREADS URNS QL MICRO: ABNORMAL /LPF
NEUTS SEG # BLD: 3.7 K/UL (ref 1.8–8)
NEUTS SEG NFR BLD: 70 % (ref 32–75)
NITRITE UR QL STRIP.AUTO: NEGATIVE
NRBC # BLD: 0 K/UL (ref 0–0.01)
NRBC BLD-RTO: 0 PER 100 WBC
OPIATES UR QL: NEGATIVE
PCP UR QL: NEGATIVE
PH UR STRIP: 8.5 (ref 5–8)
PLATELET # BLD AUTO: 217 K/UL (ref 150–400)
PMV BLD AUTO: 11.6 FL (ref 8.9–12.9)
POTASSIUM SERPL-SCNC: 3.5 MMOL/L (ref 3.5–5.1)
PROT SERPL-MCNC: 9.3 G/DL (ref 6.4–8.2)
PROT UR STRIP-MCNC: ABNORMAL MG/DL
RBC # BLD AUTO: 4.39 M/UL (ref 3.8–5.2)
RBC #/AREA URNS HPF: ABNORMAL /HPF (ref 0–5)
SODIUM SERPL-SCNC: 138 MMOL/L (ref 136–145)
SP GR UR REFRACTOMETRY: 1.03 (ref 1–1.03)
SPECIMEN HOLD: NORMAL
UROBILINOGEN UR QL STRIP.AUTO: 1 EU/DL (ref 0.2–1)
WBC # BLD AUTO: 5.3 K/UL (ref 3.6–11)
WBC URNS QL MICRO: ABNORMAL /HPF (ref 0–4)

## 2023-12-06 PROCEDURE — 80307 DRUG TEST PRSMV CHEM ANLYZR: CPT

## 2023-12-06 PROCEDURE — 6360000002 HC RX W HCPCS: Performed by: NURSE PRACTITIONER

## 2023-12-06 PROCEDURE — 85025 COMPLETE CBC W/AUTO DIFF WBC: CPT

## 2023-12-06 PROCEDURE — 81025 URINE PREGNANCY TEST: CPT

## 2023-12-06 PROCEDURE — 36415 COLL VENOUS BLD VENIPUNCTURE: CPT

## 2023-12-06 PROCEDURE — 80053 COMPREHEN METABOLIC PANEL: CPT

## 2023-12-06 PROCEDURE — 96375 TX/PRO/DX INJ NEW DRUG ADDON: CPT

## 2023-12-06 PROCEDURE — 99284 EMERGENCY DEPT VISIT MOD MDM: CPT

## 2023-12-06 PROCEDURE — 96374 THER/PROPH/DIAG INJ IV PUSH: CPT

## 2023-12-06 PROCEDURE — 83690 ASSAY OF LIPASE: CPT

## 2023-12-06 PROCEDURE — 81001 URINALYSIS AUTO W/SCOPE: CPT

## 2023-12-06 RX ORDER — NAPROXEN 500 MG/1
500 TABLET ORAL 2 TIMES DAILY PRN
Qty: 30 TABLET | Refills: 0 | Status: SHIPPED | OUTPATIENT
Start: 2023-12-06

## 2023-12-06 RX ORDER — POLYETHYLENE GLYCOL 3350 17 G/17G
17 POWDER, FOR SOLUTION ORAL DAILY
Qty: 116 G | Refills: 0 | Status: SHIPPED | OUTPATIENT
Start: 2023-12-06 | End: 2023-12-16

## 2023-12-06 RX ORDER — ONDANSETRON 2 MG/ML
4 INJECTION INTRAMUSCULAR; INTRAVENOUS ONCE
Status: COMPLETED | OUTPATIENT
Start: 2023-12-06 | End: 2023-12-06

## 2023-12-06 RX ORDER — KETOROLAC TROMETHAMINE 30 MG/ML
15 INJECTION, SOLUTION INTRAMUSCULAR; INTRAVENOUS ONCE
Status: COMPLETED | OUTPATIENT
Start: 2023-12-06 | End: 2023-12-06

## 2023-12-06 RX ADMIN — ONDANSETRON 4 MG: 2 INJECTION INTRAMUSCULAR; INTRAVENOUS at 22:03

## 2023-12-06 RX ADMIN — KETOROLAC TROMETHAMINE 15 MG: 30 INJECTION, SOLUTION INTRAMUSCULAR; INTRAVENOUS at 22:03

## 2023-12-06 ASSESSMENT — PAIN - FUNCTIONAL ASSESSMENT
PAIN_FUNCTIONAL_ASSESSMENT: PREVENTS OR INTERFERES SOME ACTIVE ACTIVITIES AND ADLS
PAIN_FUNCTIONAL_ASSESSMENT: 0-10

## 2023-12-06 ASSESSMENT — PAIN DESCRIPTION - DESCRIPTORS: DESCRIPTORS: CRAMPING

## 2023-12-06 ASSESSMENT — PAIN DESCRIPTION - ORIENTATION: ORIENTATION: LOWER;MID

## 2023-12-06 ASSESSMENT — PAIN DESCRIPTION - PAIN TYPE: TYPE: ACUTE PAIN

## 2023-12-06 ASSESSMENT — ENCOUNTER SYMPTOMS
EYE DISCHARGE: 0
SHORTNESS OF BREATH: 0

## 2023-12-06 ASSESSMENT — PAIN DESCRIPTION - LOCATION: LOCATION: ABDOMEN

## 2023-12-06 ASSESSMENT — PAIN SCALES - GENERAL: PAINLEVEL_OUTOF10: 7

## 2023-12-07 ASSESSMENT — ENCOUNTER SYMPTOMS
ABDOMINAL PAIN: 1
VOMITING: 1
CONSTIPATION: 1
NAUSEA: 1

## 2023-12-07 NOTE — ED NOTES
Patient given discharge instructions. No questions or concerns at this time. Patient vital signs stable and in no acute distress. Patient ambulatory at discharge.       Lillie Ortiz RN  12/07/23 0010

## 2023-12-07 NOTE — DISCHARGE INSTRUCTIONS
Thank you for allowing us to provide you with medical care today. We realize that you have many choices for your emergency care needs. We thank you for choosing [facility]. Please choose us in the future for any continued health care needs. We hope we addressed all of your medical concerns. We strive to provide excellent quality care in the Emergency Department. Anything less than excellent does not meet our expectations. The exam and treatment you received in the Emergency Department were for an emergent problem and are not intended as complete care. It is important that you follow up with a doctor, nurse practitioner, or [address] [place] [address] assistant for ongoing care. If your symptoms worsen or you do not improve as expected and you are unable to reach your usual health care provider, you should return to the Emergency Department. We are available 24 hours a day. Take this sheet with you when you go to your follow-up visit. If you have any problem arranging the follow-up visit, contact the Emergency Department immediately. Make an appointment your family doctor for follow up of this visit. Return to the ER if you are unable to be seen in a timely manner.

## 2023-12-07 NOTE — ED TRIAGE NOTES
Pt arrived to ED via POV with CC of lower abd pain that started around 1pm today. Pt endorses N/V/D with pain.

## 2023-12-07 NOTE — ED PROVIDER NOTES
ED if worse      Please note that this dictation was completed with [email or web address], the computer voice recognition software. Quite often unanticipated grammatical, syntax, homophones, and other interpretive errors are inadvertently transcribed by the computer software. Please disregard these errors. Please excuse any errors that have escaped final proofreading.     YASH Guerrero - NP (electronically signed)        YASH Guerrero - DAVEY  12/10/23 7930

## 2023-12-07 NOTE — BSMART NOTE
Patient's parent requested a BSMART consult but patient declined any services or to speak with writer.

## 2023-12-24 ENCOUNTER — HOSPITAL ENCOUNTER (EMERGENCY)
Facility: HOSPITAL | Age: 28
Discharge: HOME | End: 2023-12-24
Attending: EMERGENCY MEDICINE
Payer: COMMERCIAL

## 2023-12-24 VITALS
HEART RATE: 96 BPM | DIASTOLIC BLOOD PRESSURE: 89 MMHG | OXYGEN SATURATION: 96 % | RESPIRATION RATE: 16 BRPM | SYSTOLIC BLOOD PRESSURE: 142 MMHG | TEMPERATURE: 98.6 F

## 2023-12-24 DIAGNOSIS — M79.672 PAIN IN BOTH FEET: Primary | ICD-10-CM

## 2023-12-24 DIAGNOSIS — M79.671 PAIN IN BOTH FEET: Primary | ICD-10-CM

## 2023-12-24 PROCEDURE — 99283 EMERGENCY DEPT VISIT LOW MDM: CPT

## 2023-12-24 RX ORDER — BENZOCAINE/MENTHOL 6 MG-10 MG
LOZENGE MUCOUS MEMBRANE
Qty: 30 G | Refills: 1 | Status: SHIPPED | OUTPATIENT
Start: 2023-12-24 | End: 2023-12-31

## 2023-12-24 RX ORDER — NAPROXEN 500 MG/1
500 TABLET ORAL 2 TIMES DAILY
Qty: 20 TABLET | Refills: 0 | Status: SHIPPED | OUTPATIENT
Start: 2023-12-24

## 2023-12-24 NOTE — ED PROVIDER NOTES
[facility] [place] EMERGENCY DEP  EMERGENCY DEPARTMENT ENCOUNTER      Pt Name: Aj Leslie  MRN: [id number]  [address] [facility] [place] 1995  Date of evaluation: 12/24/2023  Provider: Netta Zee MD      HISTORY OF PRESENT ILLNESS      81-year-old female history of asthma, THC use presents to the emergency department chief complaint of fever few days ago and some esophageal problems. She came mostly today because she is concerned about spreading infection in her legs and feet with concern for fungus in bilateral feet. Reports that she was taking naproxen previously and some other prescription. Review of her chart indicates visits about every month with various somatic complaints. And the prescription she is referring to seems to be polyethylene glycol prescribed for constipation. Patient does not have constipation now    The history is provided by the patient and medical records. Nursing Notes were reviewed. REVIEW OF SYSTEMS         Review of Systems        PAST MEDICAL HISTORY     Past Medical History:   Diagnosis Date    Asthma     Second hand smoke exposure     Seizure [facility])     mother unsure what kind of seizure, last one was in 1st grade         SURGICAL HISTORY     No past surgical history on file. CURRENT MEDICATIONS       Previous Medications    DICYCLOMINE (BENTYL) 20 MG TABLET    Take 1 tablet by mouth every 6 hours as needed (cramping)    HYDROXYZINE HCL (ATARAX) 50 MG TABLET    Take by mouth every 6 hours as needed    NAPROXEN (NAPROSYN) 500 MG TABLET    Take 1 tablet by mouth 2 times daily as needed for Pain    PANTOPRAZOLE (PROTONIX) 20 MG TABLET    Take 1 tablet by mouth daily       ALLERGIES     Amoxicillin    FAMILY HISTORY     No family history on file.        SOCIAL HISTORY       Social History     Socioeconomic History    Marital status: Single   Tobacco Use    Smoking status: Former     Current packs/day: 0.25     Types: Cigarettes    Smokeless tobacco: Never    Tobacco comments:

## 2023-12-24 NOTE — ED TRIAGE NOTES
Patient arrives ambulatory with a CC of fevers and sore throat that started 3 weeks ago. Patient stated she was prescribed a medication she cannot name and did not finish the prescription. Stated she thinks Torey Wray has a fungus on both feet because she walks around barefoot a lot, especially at the beach. \"

## 2024-01-22 ENCOUNTER — HOSPITAL ENCOUNTER (INPATIENT)
Facility: HOSPITAL | Age: 29
LOS: 8 days | Discharge: HOME | DRG: 750 | End: 2024-01-31
Attending: EMERGENCY MEDICINE | Admitting: PSYCHIATRY & NEUROLOGY
Payer: COMMERCIAL

## 2024-01-22 DIAGNOSIS — F31.9 BIPOLAR 1 DISORDER (HCC): Primary | ICD-10-CM

## 2024-01-22 LAB
AMPHET UR QL SCN: NEGATIVE
BARBITURATES UR QL SCN: NEGATIVE
BASOPHILS # BLD: 0 K/UL (ref 0–0.1)
BASOPHILS NFR BLD: 1 % (ref 0–1)
BENZODIAZ UR QL: NEGATIVE
CANNABINOIDS UR QL SCN: POSITIVE
COCAINE UR QL SCN: NEGATIVE
DIFFERENTIAL METHOD BLD: ABNORMAL
EOSINOPHIL # BLD: 0.1 K/UL (ref 0–0.4)
EOSINOPHIL NFR BLD: 3 % (ref 0–7)
ERYTHROCYTE [DISTWIDTH] IN BLOOD BY AUTOMATED COUNT: 13.2 % (ref 11.5–14.5)
ETHANOL SERPL-MCNC: <10 MG/DL (ref 0–0.08)
HCT VFR BLD AUTO: 33.9 % (ref 35–47)
HGB BLD-MCNC: 11.3 G/DL (ref 11.5–16)
IMM GRANULOCYTES # BLD AUTO: 0 K/UL (ref 0–0.04)
IMM GRANULOCYTES NFR BLD AUTO: 1 % (ref 0–0.5)
LYMPHOCYTES # BLD: 1.5 K/UL (ref 0.8–3.5)
LYMPHOCYTES NFR BLD: 33 % (ref 12–49)
Lab: ABNORMAL
MCH RBC QN AUTO: 29.7 PG (ref 26–34)
MCHC RBC AUTO-ENTMCNC: 33.3 G/DL (ref 30–36.5)
MCV RBC AUTO: 89 FL (ref 80–99)
METHADONE UR QL: NEGATIVE
MONOCYTES # BLD: 0.4 K/UL (ref 0–1)
MONOCYTES NFR BLD: 9 % (ref 5–13)
NEUTS SEG # BLD: 2.5 K/UL (ref 1.8–8)
NEUTS SEG NFR BLD: 53 % (ref 32–75)
NRBC # BLD: 0 K/UL (ref 0–0.01)
NRBC BLD-RTO: 0 PER 100 WBC
OPIATES UR QL: NEGATIVE
PCP UR QL: NEGATIVE
PLATELET # BLD AUTO: 193 K/UL (ref 150–400)
PMV BLD AUTO: 11.8 FL (ref 8.9–12.9)
RBC # BLD AUTO: 3.81 M/UL (ref 3.8–5.2)
WBC # BLD AUTO: 4.6 K/UL (ref 3.6–11)

## 2024-01-22 PROCEDURE — 80307 DRUG TEST PRSMV CHEM ANLYZR: CPT

## 2024-01-22 PROCEDURE — 84703 CHORIONIC GONADOTROPIN ASSAY: CPT

## 2024-01-22 PROCEDURE — 80053 COMPREHEN METABOLIC PANEL: CPT

## 2024-01-22 PROCEDURE — 85025 COMPLETE CBC W/AUTO DIFF WBC: CPT

## 2024-01-22 PROCEDURE — 99285 EMERGENCY DEPT VISIT HI MDM: CPT

## 2024-01-22 PROCEDURE — 87636 SARSCOV2 & INF A&B AMP PRB: CPT

## 2024-01-22 PROCEDURE — 82077 ASSAY SPEC XCP UR&BREATH IA: CPT

## 2024-01-22 PROCEDURE — 81001 URINALYSIS AUTO W/SCOPE: CPT

## 2024-01-22 ASSESSMENT — PAIN - FUNCTIONAL ASSESSMENT: PAIN_FUNCTIONAL_ASSESSMENT: NONE - DENIES PAIN

## 2024-01-23 PROBLEM — F29 PSYCHOSIS, UNSPECIFIED PSYCHOSIS TYPE (HCC): Status: RESOLVED | Noted: 2024-01-23 | Resolved: 2024-01-23

## 2024-01-23 PROBLEM — F20.1 DISORGANIZED SCHIZOPHRENIA (HCC): Status: ACTIVE | Noted: 2024-01-23

## 2024-01-23 PROBLEM — F29 PSYCHOSIS, UNSPECIFIED PSYCHOSIS TYPE (HCC): Status: ACTIVE | Noted: 2024-01-23

## 2024-01-23 LAB
ALBUMIN SERPL-MCNC: 3.7 G/DL (ref 3.5–5)
ALBUMIN/GLOB SERPL: 1.1 (ref 1.1–2.2)
ALP SERPL-CCNC: 80 U/L (ref 45–117)
ALT SERPL-CCNC: 12 U/L (ref 12–78)
ANION GAP SERPL CALC-SCNC: 6 MMOL/L (ref 5–15)
APPEARANCE UR: CLEAR
AST SERPL-CCNC: 16 U/L (ref 15–37)
BACTERIA URNS QL MICRO: NEGATIVE /HPF
BILIRUB SERPL-MCNC: 0.3 MG/DL (ref 0.2–1)
BILIRUB UR QL: NEGATIVE
BUN SERPL-MCNC: 12 MG/DL (ref 6–20)
BUN/CREAT SERPL: 17 (ref 12–20)
CALCIUM SERPL-MCNC: 8.7 MG/DL (ref 8.5–10.1)
CHLORIDE SERPL-SCNC: 104 MMOL/L (ref 97–108)
CO2 SERPL-SCNC: 28 MMOL/L (ref 21–32)
COLOR UR: ABNORMAL
CREAT SERPL-MCNC: 0.72 MG/DL (ref 0.55–1.02)
EPITH CASTS URNS QL MICRO: ABNORMAL /LPF
FLUAV RNA SPEC QL NAA+PROBE: NOT DETECTED
FLUBV RNA SPEC QL NAA+PROBE: NOT DETECTED
GLOBULIN SER CALC-MCNC: 3.4 G/DL (ref 2–4)
GLUCOSE SERPL-MCNC: 81 MG/DL (ref 65–100)
GLUCOSE UR STRIP.AUTO-MCNC: NEGATIVE MG/DL
HCG SERPL QL: NEGATIVE
HGB UR QL STRIP: NEGATIVE
KETONES UR QL STRIP.AUTO: ABNORMAL MG/DL
LEUKOCYTE ESTERASE UR QL STRIP.AUTO: NEGATIVE
NITRITE UR QL STRIP.AUTO: NEGATIVE
PH UR STRIP: 5.5 (ref 5–8)
POTASSIUM SERPL-SCNC: 3.5 MMOL/L (ref 3.5–5.1)
PROT SERPL-MCNC: 7.1 G/DL (ref 6.4–8.2)
PROT UR STRIP-MCNC: NEGATIVE MG/DL
RBC #/AREA URNS HPF: ABNORMAL /HPF (ref 0–5)
SARS-COV-2 RNA RESP QL NAA+PROBE: NOT DETECTED
SODIUM SERPL-SCNC: 138 MMOL/L (ref 136–145)
SP GR UR REFRACTOMETRY: 1.02
URINE CULTURE IF INDICATED: ABNORMAL
UROBILINOGEN UR QL STRIP.AUTO: 1 EU/DL (ref 0.2–1)
WBC URNS QL MICRO: ABNORMAL /HPF (ref 0–4)

## 2024-01-23 PROCEDURE — 99223 1ST HOSP IP/OBS HIGH 75: CPT | Performed by: PSYCHIATRY & NEUROLOGY

## 2024-01-23 PROCEDURE — 1240000000 HC EMOTIONAL WELLNESS R&B

## 2024-01-23 PROCEDURE — 6370000000 HC RX 637 (ALT 250 FOR IP): Performed by: PSYCHIATRY & NEUROLOGY

## 2024-01-23 RX ORDER — HALOPERIDOL 5 MG/ML
5 INJECTION INTRAMUSCULAR EVERY 4 HOURS PRN
Status: DISCONTINUED | OUTPATIENT
Start: 2024-01-23 | End: 2024-01-31 | Stop reason: HOSPADM

## 2024-01-23 RX ORDER — ACETAMINOPHEN 325 MG/1
650 TABLET ORAL EVERY 4 HOURS PRN
Status: DISCONTINUED | OUTPATIENT
Start: 2024-01-23 | End: 2024-01-31 | Stop reason: HOSPADM

## 2024-01-23 RX ORDER — MAGNESIUM HYDROXIDE/ALUMINUM HYDROXICE/SIMETHICONE 120; 1200; 1200 MG/30ML; MG/30ML; MG/30ML
30 SUSPENSION ORAL EVERY 6 HOURS PRN
Status: DISCONTINUED | OUTPATIENT
Start: 2024-01-23 | End: 2024-01-31 | Stop reason: HOSPADM

## 2024-01-23 RX ORDER — HALOPERIDOL 5 MG/1
5 TABLET ORAL EVERY 4 HOURS PRN
Status: DISCONTINUED | OUTPATIENT
Start: 2024-01-23 | End: 2024-01-31 | Stop reason: HOSPADM

## 2024-01-23 RX ORDER — TRAZODONE HYDROCHLORIDE 50 MG/1
50 TABLET ORAL NIGHTLY PRN
Status: DISCONTINUED | OUTPATIENT
Start: 2024-01-23 | End: 2024-01-31 | Stop reason: HOSPADM

## 2024-01-23 RX ORDER — HYDROXYZINE HYDROCHLORIDE 25 MG/1
50 TABLET, FILM COATED ORAL 3 TIMES DAILY PRN
Status: DISCONTINUED | OUTPATIENT
Start: 2024-01-23 | End: 2024-01-31 | Stop reason: HOSPADM

## 2024-01-23 RX ORDER — POLYETHYLENE GLYCOL 3350 17 G/17G
17 POWDER, FOR SOLUTION ORAL DAILY PRN
Status: DISCONTINUED | OUTPATIENT
Start: 2024-01-23 | End: 2024-01-31 | Stop reason: HOSPADM

## 2024-01-23 RX ORDER — RISPERIDONE 1 MG/ML
1 SOLUTION ORAL 2 TIMES DAILY
Status: DISCONTINUED | OUTPATIENT
Start: 2024-01-23 | End: 2024-01-25

## 2024-01-23 RX ORDER — DIPHENHYDRAMINE HYDROCHLORIDE 50 MG/ML
50 INJECTION INTRAMUSCULAR; INTRAVENOUS EVERY 4 HOURS PRN
Status: DISCONTINUED | OUTPATIENT
Start: 2024-01-23 | End: 2024-01-31 | Stop reason: HOSPADM

## 2024-01-23 RX ADMIN — RISPERIDONE 1 MG: 1 SOLUTION ORAL at 11:44

## 2024-01-23 RX ADMIN — RISPERIDONE 1 MG: 1 SOLUTION ORAL at 21:30

## 2024-01-23 ASSESSMENT — SLEEP AND FATIGUE QUESTIONNAIRES
AVERAGE NUMBER OF SLEEP HOURS: 8
DO YOU HAVE DIFFICULTY SLEEPING: NO
DO YOU USE A SLEEP AID: NO

## 2024-01-23 ASSESSMENT — PAIN SCALES - GENERAL
PAINLEVEL_OUTOF10: 0
PAINLEVEL_OUTOF10: 0

## 2024-01-23 ASSESSMENT — LIFESTYLE VARIABLES
HOW MANY STANDARD DRINKS CONTAINING ALCOHOL DO YOU HAVE ON A TYPICAL DAY: PATIENT DOES NOT DRINK
HOW OFTEN DO YOU HAVE A DRINK CONTAINING ALCOHOL: PATIENT DECLINED

## 2024-01-23 NOTE — PLAN OF CARE

## 2024-01-23 NOTE — ED NOTES
Pt changed into paper scrubs, belongings placed into 2 bags, security notified to wand belongings. Pt given socks, ginger ale, & cranberry juice. Denies further needs at this time, [employer] at bedside.
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
## 2024-01-23 NOTE — PROGRESS NOTES
Laboratory Monitoring for Mood Stabilizers and Antipsychotics    Recommended baseline monitoring has been completed based on this patient's current medication regimen.     This patient is currently prescribed the following medication(s):   Current Facility-Administered Medications: acetaminophen (TYLENOL) tablet 650 mg, 650 mg, Oral, Q4H PRN  polyethylene glycol (GLYCOLAX) packet 17 g, 17 g, Oral, Daily PRN  aluminum & magnesium hydroxide-simethicone (MAALOX) 200-200-20 MG/5ML suspension 30 mL, 30 mL, Oral, Q6H PRN  hydrOXYzine HCl (ATARAX) tablet 50 mg, 50 mg, Oral, TID PRN  haloperidol (HALDOL) tablet 5 mg, 5 mg, Oral, Q4H PRN **OR** haloperidol lactate (HALDOL) injection 5 mg, 5 mg, IntraMUSCular, Q4H PRN  diphenhydrAMINE (BENADRYL) injection 50 mg, 50 mg, IntraMUSCular, Q4H PRN  traZODone (DESYREL) tablet 50 mg, 50 mg, Oral, Nightly PRN  risperiDONE (RisperDAL) 1 MG/ML oral solution 1 mg, 1 mg, Oral, BID    The following labs have been completed for monitoring of antipsychotics and/or mood stabilizers:    Height, Weight, BMI Estimation  Estimated body mass index is 20.12 kg/m² as calculated from the following:    Height as of this encounter: 1.575 m (5' 2\").    Weight as of this encounter: 49.9 kg (110 lb).     Vital Signs/Blood Pressure  /81   Pulse 80   Temp 97.8 °F (36.6 °C) (Oral)   Resp 16   Ht 1.575 m (5' 2\")   Wt 49.9 kg (110 lb)   SpO2 100%   BMI 20.12 kg/m²     Renal Function, Hepatic Function and Chemistry  Estimated Creatinine Clearance: 92 mL/min (based on SCr of 0.72 mg/dL).    Lab Results   Component Value Date/Time     01/22/2024 11:11 PM    K 3.5 01/22/2024 11:11 PM     01/22/2024 11:11 PM    CO2 28 01/22/2024 11:11 PM    ANIONGAP 6 01/22/2024 11:11 PM    GLUCOSE 81 01/22/2024 11:11 PM    BUN 12 01/22/2024 11:11 PM    CREATININE 0.72 01/22/2024 11:11 PM    BUNCRER 17 01/22/2024 11:11 PM    BILITOT 0.3 01/22/2024 11:11 PM    PROT 7.1 01/22/2024 11:11 PM    LABALBU 3.7

## 2024-01-23 NOTE — PROGRESS NOTES
Writer met with patient in the dayroom. Patient was sitting at a table and appeared calm/free from distress. Patient was cooperative with assessment. Patient is alert and oriented to person, time, and place. Patient is somewhat disoriented to situation. Patient presents with a flat affect and anxious mood. Patient endorses depression 1/10 and anxiety 2/10 and does not request any prn medication at this time. Patient denies SI, HI, and AVH. Patient observed to have paranoid delusions that there are \"clones\" trying to steal her identity and live her life. Throughout interaction patient frequently stated that she was in school and \"doing well\", at times this statement was not relevant to the question writer had asked. Patient is medication and meal compliant. Q15 minute safety checks maintained.
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
## 2024-01-23 NOTE — ED NOTES
ED Course as of 01/23/24 0029   Mon Jan 22, 2024   2254 Discussed with [facility], planned TDO. [MB]   e Jan 23, 2024 0029 Lab work reviewed and unremarkable.  Patient is medically cleared for psychiatric evaluation and disposition. [HW]      ED Course User Index  [HW] Hemant Christensen MD  [MB] Prashanth Peterson MD Wong, Henry, MD  01/23/24 0030

## 2024-01-23 NOTE — ED NOTES
TRANSFER - OUT REPORT:    Verbal report given to CONNOR Handley on Yen Edgar  being transferred to [facility] 312 for routine progression of patient care       Report consisted of patient's Situation, Background, Assessment and   Recommendations(SBAR).     Information from the following report(s) Nurse Handoff Report, Index, ED Encounter Summary, ED SBAR, Adult Overview, Intake/Output, MAR, and Recent Results was reviewed with the receiving nurse.    [place] Fall Assessment:    Presents to emergency department  because of falls (Syncope, seizure, or loss of consciousness): No  Age > 70: No  Altered Mental Status, Intoxication with alcohol or substance confusion (Disorientation, impaired judgment, poor safety awaremess, or inability to follow instructions): No  Impaired Mobility: Ambulates or transfers with assistive devices or assistance; Unable to ambulate or transer.: No  Nursing Judgement: No          Lines:       Opportunity for questions and clarification was provided.      Patient transported with:  Security & 2 bags of belongings

## 2024-01-23 NOTE — ED PROVIDER NOTES
TABLET    Take 1 tablet by mouth every 6 hours as needed (cramping)    HYDROXYZINE HCL (ATARAX) 50 MG TABLET    Take by mouth every 6 hours as needed    NAPROXEN (NAPROSYN) 500 MG TABLET    Take 1 tablet by mouth 2 times daily    PANTOPRAZOLE (PROTONIX) 20 MG TABLET    Take 1 tablet by mouth daily       SCREENINGS               No data recorded         PHYSICAL EXAM      ED Triage Vitals [01/22/24 2055]   Enc Vitals Group      /88      Pulse 99      Respirations 16      Temp 98.3 °F (36.8 °C)      Temp Source Oral      SpO2 100 %      Weight - Scale 49.9 kg (110 lb)      Height 1.575 m (5' 2\")      Head Circumference       Peak Flow       Pain Score       Pain Loc       Pain Edu?       Excl. in GC?         Physical Exam  Vitals and nursing note reviewed.   Constitutional:       Comments: 29-year-old female, sitting on stretcher, no acute distress   HENT:      Head: Normocephalic.      Nose: Nose normal.   Pulmonary:      Effort: Pulmonary effort is normal.   Abdominal:      General: Abdomen is flat.      Tenderness: There is no abdominal tenderness.   Musculoskeletal:         General: No swelling. Normal range of motion.   Skin:     General: Skin is warm.   Neurological:      General: No focal deficit present.   Psychiatric:      Comments: Calm and cooperative, no SI/HI          DIAGNOSTIC RESULTS   LABS:     No results found for this or any previous visit (from the past 24 hour(s)).    EKG: If performed, independent interpretation documented below in the MDM section     RADIOLOGY:  Non-plain film images such as CT, Ultrasound and MRI are read by the radiologist. Plain radiographic images are visualized and preliminarily interpreted by the ED Provider with the findings documented in the MDM section.     Interpretation per the Radiologist below, if available at the time of this note:     No orders to display        PROCEDURES   Unless otherwise noted below, none  Procedures     CRITICAL CARE TIME
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
## 2024-01-23 NOTE — ED NOTES
Pt presents to ED with [employer] under ECO issued at [address] [facility]. Per [employer], pt's mother issued ECO after pt's behavior at home became increasingly verbally & physically aggressive. Pt has hx of bipolar 1, but not being followed up with for disorder. Pt is calm & cooperative on arrival, denies SI/HI/AVH. Pt denies drug/etoh use. Pt is A&Ox4, RR even & unlabored, skin is warm & dry. Pt in NAD, updated on plan of care, call light within reach, [employer] remains at bedside.       Emergency Department Nursing Plan of Care       The Nursing Plan of Care is developed from the Nursing assessment and Emergency Department Attending provider initial evaluation.  The plan of care may be reviewed in the “ED Provider note”.    The Plan of Care was developed with the following considerations:   Patient / Family readiness to learn indicated by:verbalized understanding  Persons(s) to be included in education: patient  Barriers to Learning/Limitations: No    Signed     Lakesha Pena RN    1/23/2024   12:40 AM

## 2024-01-23 NOTE — ED NOTES
Pt's bilateral forensic restraints removed once in ED room, no evidence of skin breakdown, neurovascular intact.

## 2024-01-23 NOTE — PLAN OF CARE
Problem: Discharge Planning  Goal: Discharge to home or other facility with appropriate resources  Outcome: Progressing     Problem: Anxiety  Goal: Will report anxiety at manageable levels  Description: INTERVENTIONS:  1. Administer medication as ordered  2. Teach and rehearse alternative coping skills  3. Provide emotional support with 1:1 interaction with staff  Outcome: Progressing     Problem: Coping  Goal: Pt/Family able to verbalize concerns and demonstrate effective coping strategies  Description: INTERVENTIONS:  1. Assist patient/family to identify coping skills, available support systems and cultural and spiritual values  2. Provide emotional support, including active listening and acknowledgement of concerns of patient and caregivers  3. Reduce environmental stimuli, as able  4. Instruct patient/family in relaxation techniques, as appropriate  5. Assess for spiritual pain/suffering and initiate Spiritual Care, Psychosocial Clinical Specialist consults as needed  Outcome: Progressing     Problem: Behavior  Goal: Pt/Family maintain appropriate behavior and adhere to behavioral management agreement, if implemented  Description: INTERVENTIONS:  1. Assess patient/family's coping skills and  non-compliant behavior (including use of illegal substances)  2. Notify security of behavior or suspected illegal substances which indicate the need for search of the family and/or belongings  3. Encourage verbalization of thoughts and concerns in a socially appropriate manner  4. Utilize positive, consistent limit setting strategies supporting safety of patient, staff and others  5. Encourage participation in the decision making process about the behavioral management agreement  6. If a visitor's behavior poses a threat to safety call refer to organization policy.  7. Initiate consult with , Psychosocial CNS, Spiritual Care as appropriate  Outcome: Progressing     Problem: Depression/Self Harm  Goal: Effect

## 2024-01-23 NOTE — PROGRESS NOTES
[facility] Admission Pharmacy Medication Reconciliation    Information obtained from:Spoke with patient while in rounds. Per insurance records patient has not filled any medications recently, patient confirms she was not taking any medications prior to admission   RxQuery data available1:Yes     Comments/recommendations:  Per patient, was taking seroquel after prior hospitalization in [place] 2 years prior, however was experiencing constipating side effects and self-discontinued the medication.     Patient reports they were not taking any medications prior to current admission     Pt confirms that they have an allergy to amoxicillin     The [place] Prescription Monitoring Program () was accessed to determine fill history of any controlled medications. Patient was in  system.   Checked  aware from [place] since patient travels to [place] for classes, patient had no controlled RX from [place] records     Medication changes (since last review):  Removed  Dicyclomine   Hydroxyzine  Naproxen  Pantoprazole         1RxQuery pharmacy benefit data reflects medications filled and processed through the patient's insurance, however                this data does NOT capture whether the medication was picked up or is currently being taken by the patient.         Patient allergies:   Allergies as of 01/22/2024 - Fully Reviewed 01/22/2024   Allergen Reaction Noted    Amoxicillin Hives 11/21/2016         Thank you,  Daya Hudson, [facility]

## 2024-01-23 NOTE — GROUP NOTE
Group Therapy Note    Date: 1/23/2024    Group Start Time: 1100  Group End Time: 1200  Group Topic: Topic Group    [facility] 3 ACUTE [facility]    Lidya Thomason        Group Therapy Note           Patient's Goal:  To participate in stress management Get Satisfaction game    Notes:  Pleasant-elected to listen and work on individual art project    Endings: None Reported    Modes of Intervention: Education and Activity      Discipline Responsible: Recreational Therapist      Signature:  LIDYA THOMASON

## 2024-01-24 LAB
CHOLEST SERPL-MCNC: 154 MG/DL
EST. AVERAGE GLUCOSE BLD GHB EST-MCNC: 108 MG/DL
HBA1C MFR BLD: 5.4 % (ref 4–5.6)
HDLC SERPL-MCNC: 61 MG/DL
HDLC SERPL: 2.5 (ref 0–5)
LDLC SERPL CALC-MCNC: 82.4 MG/DL (ref 0–100)
TRIGL SERPL-MCNC: 53 MG/DL
TSH SERPL DL<=0.05 MIU/L-ACNC: 2.04 UIU/ML (ref 0.36–3.74)
VLDLC SERPL CALC-MCNC: 10.6 MG/DL

## 2024-01-24 PROCEDURE — 83036 HEMOGLOBIN GLYCOSYLATED A1C: CPT

## 2024-01-24 PROCEDURE — 6370000000 HC RX 637 (ALT 250 FOR IP)

## 2024-01-24 PROCEDURE — 99232 SBSQ HOSP IP/OBS MODERATE 35: CPT | Performed by: PSYCHIATRY & NEUROLOGY

## 2024-01-24 PROCEDURE — 80061 LIPID PANEL: CPT

## 2024-01-24 PROCEDURE — 6370000000 HC RX 637 (ALT 250 FOR IP): Performed by: PSYCHIATRY & NEUROLOGY

## 2024-01-24 PROCEDURE — 36415 COLL VENOUS BLD VENIPUNCTURE: CPT

## 2024-01-24 PROCEDURE — 84443 ASSAY THYROID STIM HORMONE: CPT

## 2024-01-24 PROCEDURE — 1240000000 HC EMOTIONAL WELLNESS R&B

## 2024-01-24 RX ADMIN — RISPERIDONE 1 MG: 1 SOLUTION ORAL at 20:34

## 2024-01-24 RX ADMIN — HYDROXYZINE HYDROCHLORIDE 50 MG: 25 TABLET, FILM COATED ORAL at 20:34

## 2024-01-24 RX ADMIN — RISPERIDONE 1 MG: 1 SOLUTION ORAL at 08:34

## 2024-01-24 NOTE — PROGRESS NOTES
Patient woken up for AM labs. Blood obtained from [facility] and specimens delivered to lab. Patient tolerated procedure with no complaints.

## 2024-01-24 NOTE — PLAN OF CARE
0830 At this time patient was met in her room, she was cooperative with assessment. Patient is alert and oriented. Upon assessment patient denied depression, anxiety, SI, HI, and AVH.  Vital signs within normal parameters. Patient is medication and meal compliant. Q15 minute safety checks maintained. Will continue to monitor.     Problem: Depression/Self Harm  Goal: Effect of psychiatric condition will be minimized and patient will be protected from self harm  Description: INTERVENTIONS:  1. Assess impact of patient's symptoms on level of functioning, self care needs and offer support as indicated  2. Assess patient/family knowledge of depression, impact on illness and need for teaching  3. Provide emotional support, presence and reassurance  4. Assess for possible suicidal thoughts or ideation. If patient expresses suicidal thoughts or statements do not leave alone, initiate Suicide Precautions, move to a room close to the nursing station and obtain sitter  5. Initiate consults as appropriate with Mental Health Professional, Spiritual Care, Psychosocial CNS, and consider a recommendation to the LIP for a Psychiatric Consultation  Outcome: Progressing

## 2024-01-24 NOTE — PROGRESS NOTES
Patient actively participated in Spirituality Group in the Behavioral Health unit.  utilized music, lyrics to favorite songs, words of encouragement and affirmation, scripture, testimony, and a devotional reading to facilitate the group discussion and enhance group dynamics.  Rev. Marycarmen Hernandez MDiv,

## 2024-01-24 NOTE — GROUP NOTE
Group Therapy Note    Date: 1/24/2024    Group Start Time: 1400  Group End Time: 1500  Group Topic: Recreational    [facility] 3 ACUTE BEHAV [facility]    Lidya Thomason        Group Therapy Note           Patient's Goal:  To concentrate on selected task    Notes:  Pleasant, active participant,flat affect    Status After Intervention:  Improved    Participation Level: Active Listener and Interactive    Participation Quality: Attentive      Speech:  normal      Thought Process/Content: Logical      Affective Functioning: Congruent      Level of consciousness:  Alert and Attentive      Response to Learning: Progressing to goal      Endings: None Reported    Modes of Intervention: Socialization and Activity      Discipline Responsible: Recreational Therapist      Signature:  LIDYA THOMASON

## 2024-01-25 PROCEDURE — 6370000000 HC RX 637 (ALT 250 FOR IP): Performed by: PSYCHIATRY & NEUROLOGY

## 2024-01-25 PROCEDURE — 99232 SBSQ HOSP IP/OBS MODERATE 35: CPT | Performed by: PSYCHIATRY & NEUROLOGY

## 2024-01-25 PROCEDURE — 1240000000 HC EMOTIONAL WELLNESS R&B

## 2024-01-25 RX ORDER — RISPERIDONE 1 MG/ML
2 SOLUTION ORAL 2 TIMES DAILY
Status: DISCONTINUED | OUTPATIENT
Start: 2024-01-25 | End: 2024-01-31

## 2024-01-25 RX ADMIN — Medication 2 MG: at 21:00

## 2024-01-25 RX ADMIN — RISPERIDONE 1 MG: 1 SOLUTION ORAL at 08:03

## 2024-01-25 ASSESSMENT — PAIN SCALES - GENERAL
PAINLEVEL_OUTOF10: 0
PAINLEVEL_OUTOF10: 0

## 2024-01-25 NOTE — PLAN OF CARE
0730 At this time patient was met in the hallway, she was cooperative with assessment. Patient is alert and oriented. Upon assessment patient denied depression, anxiety, SI, HI, and A/V hallucinations.  Vital signs within normal parameters. Patient is medication and meal compliant. Q15 minute safety checks maintained. Will continue to monitor.     Problem: Anxiety  Goal: Will report anxiety at manageable levels  Description: INTERVENTIONS:  1. Administer medication as ordered  2. Teach and rehearse alternative coping skills  3. Provide emotional support with 1:1 interaction with staff  Outcome: Progressing

## 2024-01-25 NOTE — GROUP NOTE
Group Therapy Note    Date: 1/25/2024    Group Start Time: 1400  Group End Time: 1500  Group Topic: Recreational    [facility] 3 ACUTE BEHAV [facility]    Lidya Thomason        Group Therapy Note           Patient's Goal:  To concentrate on selected task    Notes:  Pleasant-active participant    Status After Intervention:  Improved    Participation Level: Active Listener and Interactive    Participation Quality: Attentive      Speech:  normal      Affective Functioning: Congruent      Mood: euthymic      Level of consciousness:  Alert and Attentive      Response to Learning: Progressing to goal      Endings: None Reported    Modes of Intervention: Socialization and Activity      Discipline Responsible: Recreational Therapist      Signature:  LIDYA THOMASON

## 2024-01-25 NOTE — PROGRESS NOTES
[id number]    Yen is visible sitting in the day room watching TV. Her affect is reactive. Calm and cooperative. Rates her current anxiety 5/10 and depression 0. She denies current SI/HI/AVH. Reports looking forward to getting out of the hospital so she can return back to school. She is med compliant. Requested PRN Hydroxyzine with HS medication. No other voiced concerns at this time. Staff will continue to monitor for safety,location, and behavior [id number].     0600 no significant changes, patient slept for 8 hours this shift.    - - -

## 2024-01-25 NOTE — CARE COORDINATION
01/25/24 1552   ITP   Date of Next Review 01/31/24   Short Term Goal 1   STG Goal 1 Status: Patient Appears to be  Progressing toward treatment plan goal   Short Term Goal 2   STG Goal 2 Status: Patient Appears to be  Progressing toward treatment plan goal

## 2024-01-26 PROCEDURE — 99232 SBSQ HOSP IP/OBS MODERATE 35: CPT | Performed by: PSYCHIATRY & NEUROLOGY

## 2024-01-26 PROCEDURE — 1240000000 HC EMOTIONAL WELLNESS R&B

## 2024-01-26 PROCEDURE — 6370000000 HC RX 637 (ALT 250 FOR IP): Performed by: PSYCHIATRY & NEUROLOGY

## 2024-01-26 RX ADMIN — Medication 2 MG: at 21:01

## 2024-01-26 RX ADMIN — Medication 2 MG: at 08:51

## 2024-01-26 NOTE — PROGRESS NOTES
RN Reassessment Note 7pm-7am:    Yen is visible sitting in the day room watching TV. Her affect is reactive, calm and cooperative. Pt rates her current anxiety 3/10 and denies depression. She denies pain, SI/HI/AVH. Reports she is eager to be going home. Yen shared that she is glad that she came in to get help because she needed to get her thoughts together, Pt stated that when she is stressed and there is a lot going on she tends to shut down and hod her emotions in. She is med and meal compliant. No PRN medication was given. Pt voiced any  concerns at this time. Staff will continue to be monitor for safety, location, and behavior [id number].   patient slept for 6 hours this shift.

## 2024-01-26 NOTE — GROUP NOTE
Group Therapy Note    Date: 1/26/2024    Group Start Time: 1400  Group End Time: 1500  Group Topic: Recreational    [facility] 3 ACUTE BEHAV [facility]    Lidya Thomason        Group Therapy Note           Patient's Goal:  To concentrate on selected task    Notes:  Pleasant-active participant    Status After Intervention:  Improved    Participation Level: Active Listener and Interactive    Participation Quality: Attentive and Sharing      Speech:  normal      Affective Functioning: Congruent        Level of consciousness:  Alert and Attentive      Response to Learning: Progressing to goal      Endings: None Reported    Modes of Intervention: Socialization and Activity      Discipline Responsible: Recreational Therapist      Signature:  LIDYA THOMASON

## 2024-01-27 PROCEDURE — 6370000000 HC RX 637 (ALT 250 FOR IP): Performed by: PSYCHIATRY & NEUROLOGY

## 2024-01-27 PROCEDURE — 1240000000 HC EMOTIONAL WELLNESS R&B

## 2024-01-27 PROCEDURE — 6370000000 HC RX 637 (ALT 250 FOR IP)

## 2024-01-27 RX ADMIN — Medication 2 MG: at 20:38

## 2024-01-27 RX ADMIN — ACETAMINOPHEN 650 MG: 325 TABLET ORAL at 20:38

## 2024-01-27 RX ADMIN — Medication 2 MG: at 07:46

## 2024-01-27 ASSESSMENT — PAIN DESCRIPTION - DESCRIPTORS: DESCRIPTORS: ACHING

## 2024-01-27 ASSESSMENT — PAIN - FUNCTIONAL ASSESSMENT: PAIN_FUNCTIONAL_ASSESSMENT: ACTIVITIES ARE NOT PREVENTED

## 2024-01-27 ASSESSMENT — PAIN SCALES - GENERAL: PAINLEVEL_OUTOF10: 5

## 2024-01-27 ASSESSMENT — PAIN DESCRIPTION - ORIENTATION: ORIENTATION: LOWER

## 2024-01-27 NOTE — PLAN OF CARE
Problem: Discharge Planning  Goal: Discharge to home or other facility with appropriate resources  Outcome: Progressing     Problem: Depression/Self Harm  Goal: Effect of psychiatric condition will be minimized and patient will be protected from self harm  Description: INTERVENTIONS:  1. Assess impact of patient's symptoms on level of functioning, self care needs and offer support as indicated  2. Assess patient/family knowledge of depression, impact on illness and need for teaching  3. Provide emotional support, presence and reassurance  4. Assess for possible suicidal thoughts or ideation. If patient expresses suicidal thoughts or statements do not leave alone, initiate Suicide Precautions, move to a room close to the nursing station and obtain sitter  5. Initiate consults as appropriate with Mental Health Professional, Spiritual Care, Psychosocial CNS, and consider a recommendation to the LIP for a Psychiatric Consultation  Outcome: Progressing     Problem: Drug Abuse/Detox  Goal: Will have no detox symptoms and will verbalize plan for changing drug-related behavior  Description: INTERVENTIONS:  1. Administer medication as ordered  2. Monitor physical status  3. Provide emotional support with 1:1 interaction with staff  4. Encourage  recovery focused treatment   Outcome: Progressing

## 2024-01-28 PROCEDURE — 1240000000 HC EMOTIONAL WELLNESS R&B

## 2024-01-28 PROCEDURE — 6370000000 HC RX 637 (ALT 250 FOR IP)

## 2024-01-28 PROCEDURE — 6370000000 HC RX 637 (ALT 250 FOR IP): Performed by: PSYCHIATRY & NEUROLOGY

## 2024-01-28 RX ADMIN — Medication 2 MG: at 20:09

## 2024-01-28 RX ADMIN — ACETAMINOPHEN 650 MG: 325 TABLET ORAL at 08:51

## 2024-01-28 RX ADMIN — ACETAMINOPHEN 650 MG: 325 TABLET ORAL at 18:32

## 2024-01-28 RX ADMIN — Medication 2 MG: at 08:50

## 2024-01-28 ASSESSMENT — PAIN DESCRIPTION - LOCATION
LOCATION: HEAD
LOCATION: BACK

## 2024-01-28 ASSESSMENT — PAIN SCALES - GENERAL
PAINLEVEL_OUTOF10: 0
PAINLEVEL_OUTOF10: 4
PAINLEVEL_OUTOF10: 0
PAINLEVEL_OUTOF10: 5

## 2024-01-28 ASSESSMENT — PAIN DESCRIPTION - DESCRIPTORS
DESCRIPTORS: ACHING
DESCRIPTORS: ACHING

## 2024-01-28 NOTE — PLAN OF CARE
Problem: Anxiety  Goal: Will report anxiety at manageable levels  Description: INTERVENTIONS:  1. Administer medication as ordered  2. Teach and rehearse alternative coping skills  3. Provide emotional support with 1:1 interaction with staff  Outcome: Progressing     Problem: Coping  Goal: Pt/Family able to verbalize concerns and demonstrate effective coping strategies  Description: INTERVENTIONS:  1. Assist patient/family to identify coping skills, available support systems and cultural and spiritual values  2. Provide emotional support, including active listening and acknowledgement of concerns of patient and caregivers  3. Reduce environmental stimuli, as able  4. Instruct patient/family in relaxation techniques, as appropriate  5. Assess for spiritual pain/suffering and initiate Spiritual Care, Psychosocial Clinical Specialist consults as needed  Outcome: Progressing     Problem: Confusion  Goal: Confusion, delirium, dementia, or psychosis is improved or at baseline  Description: INTERVENTIONS:  1. Assess for possible contributors to thought disturbance, including medications, impaired vision or hearing, underlying metabolic abnormalities, dehydration, psychiatric diagnoses, and notify attending LIP  2. [place] high risk fall precautions, as indicated  3. Provide frequent short contacts to provide reality reorientation, refocusing and direction  4. Decrease environmental stimuli, including noise as appropriate  5. Monitor and intervene to maintain adequate nutrition, hydration, elimination, sleep and activity  6. If unable to ensure safety without constant attention obtain sitter and review sitter guidelines with assigned personnel  7. Initiate Psychosocial CNS and Spiritual Care consult, as indicated  Outcome: Progressing     Problem: Behavior  Goal: Pt/Family maintain appropriate behavior and adhere to behavioral management agreement, if implemented  Description: INTERVENTIONS:  1. Assess patient/family's

## 2024-01-28 NOTE — PROGRESS NOTES
Weekend Progress Note:    Chief Complaint:    Length of Stay: 5 Days    Interval History:  The patient appears anxious and suspicious. Isolative to her room. Complaining of lower back back. Denies SI/HI/AVH. Focused on discharging. No aggression noted.       Past Medical History:  Past Medical History:   Diagnosis Date    Asthma     Second hand smoke exposure     Seizure (HCC)     mother unsure what kind of seizure, last one was in 1st grade           Labs:  Lab Results   Component Value Date/Time    WBC 4.6 01/22/2024 11:11 PM    HGB 11.3 01/22/2024 11:11 PM    HCT 33.9 01/22/2024 11:11 PM     01/22/2024 11:11 PM    MCV 89.0 01/22/2024 11:11 PM      Lab Results   Component Value Date/Time     01/22/2024 11:11 PM    K 3.5 01/22/2024 11:11 PM     01/22/2024 11:11 PM    CO2 28 01/22/2024 11:11 PM    BUN 12 01/22/2024 11:11 PM    GFRAA >60 05/05/2022 10:10 PM    GLOB 3.4 01/22/2024 11:11 PM    ALT 12 01/22/2024 11:11 PM      [unfilled]      Current Facility-Administered Medications   Medication Dose Route Frequency Provider Last Rate Last Admin    risperiDONE (RisperDAL) 1 MG/ML oral solution 2 mg  2 mg Oral BID Jada Fuentes MD   2 mg at 01/28/24 0850    acetaminophen (TYLENOL) tablet 650 mg  650 mg Oral Q4H PRN Basir, Nasr, APRN - NP   650 mg at 01/28/24 0851    polyethylene glycol (GLYCOLAX) packet 17 g  17 g Oral Daily PRN Basir, Nasr, APRN - NP        aluminum & magnesium hydroxide-simethicone (MAALOX) 200-200-20 MG/5ML suspension 30 mL  30 mL Oral Q6H PRN Basir, Nasr, APRN - NP        hydrOXYzine HCl (ATARAX) tablet 50 mg  50 mg Oral TID PRN Basir, Nasr, APRN - NP   50 mg at 01/24/24 2034    haloperidol (HALDOL) tablet 5 mg  5 mg Oral Q4H PRN Basir, Nasr, APRN - NP        Or    haloperidol lactate (HALDOL) injection 5 mg  5 mg IntraMUSCular Q4H PRN Basir, Nasr, APRN - NP        diphenhydrAMINE (BENADRYL) injection 50 mg  50 mg IntraMUSCular Q4H PRN Basir, Nasr, APRN - NP        traZODone

## 2024-01-29 PROCEDURE — 99232 SBSQ HOSP IP/OBS MODERATE 35: CPT | Performed by: PSYCHIATRY & NEUROLOGY

## 2024-01-29 PROCEDURE — 6370000000 HC RX 637 (ALT 250 FOR IP)

## 2024-01-29 PROCEDURE — 1240000000 HC EMOTIONAL WELLNESS R&B

## 2024-01-29 PROCEDURE — 6370000000 HC RX 637 (ALT 250 FOR IP): Performed by: PSYCHIATRY & NEUROLOGY

## 2024-01-29 RX ADMIN — ACETAMINOPHEN 650 MG: 325 TABLET ORAL at 08:17

## 2024-01-29 RX ADMIN — Medication 2 MG: at 08:14

## 2024-01-29 RX ADMIN — Medication 2 MG: at 21:35

## 2024-01-29 ASSESSMENT — PAIN DESCRIPTION - ORIENTATION: ORIENTATION: LOWER;LEFT;RIGHT

## 2024-01-29 ASSESSMENT — PAIN SCALES - GENERAL
PAINLEVEL_OUTOF10: 0
PAINLEVEL_OUTOF10: 0
PAINLEVEL_OUTOF10: 7

## 2024-01-29 ASSESSMENT — PAIN DESCRIPTION - DESCRIPTORS: DESCRIPTORS: SHARP

## 2024-01-29 ASSESSMENT — PAIN DESCRIPTION - LOCATION: LOCATION: ABDOMEN

## 2024-01-29 NOTE — GROUP NOTE
Group Therapy Note    Date: 1/29/2024    Group Start Time: 1000  Group End Time: 1130  Group Topic: Topic Group    [facility] 3 ACUTE BEHAV [facility]    Lidya Thomason        Group Therapy Note    Attendees: 11       Patient's Goal:  To participate in mental health Fididel game    Notes:  Pt did not attend session    Discipline Responsible: Recreational Therapist      Signature:  LIDYA THOMASON

## 2024-01-29 NOTE — PLAN OF CARE
Problem: Anxiety  Goal: Will report anxiety at manageable levels  Description: INTERVENTIONS:  1. Administer medication as ordered  2. Teach and rehearse alternative coping skills  3. Provide emotional support with 1:1 interaction with staff  Outcome: Progressing

## 2024-01-29 NOTE — PROGRESS NOTES
Pt screened per LOS policy.  No acute nutrition or weight issues identified.  Pt meal compliant. Hx unremarkable per nutrition.  Ht: 5'2\"  Wt: 110 lb  BMI: 20.12 kg/(m^2) c/w normal weight  Est energy needs: 1760 kcal, 62 g protein, 1 mL/kcal fluids  Pt will consume > 75% of meals at follow up 7-10 days  LOS

## 2024-01-29 NOTE — PLAN OF CARE
Problem: Coping  Goal: Pt/Family able to verbalize concerns and demonstrate effective coping strategies  Description: INTERVENTIONS:  1. Assist patient/family to identify coping skills, available support systems and cultural and spiritual values  2. Provide emotional support, including active listening and acknowledgement of concerns of patient and caregivers  3. Reduce environmental stimuli, as able  4. Instruct patient/family in relaxation techniques, as appropriate  5. Assess for spiritual pain/suffering and initiate Spiritual Care, Psychosocial Clinical Specialist consults as needed  Outcome: Progressing     Problem: Behavior  Goal: Pt/Family maintain appropriate behavior and adhere to behavioral management agreement, if implemented  Description: INTERVENTIONS:  1. Assess patient/family's coping skills and  non-compliant behavior (including use of illegal substances)  2. Notify security of behavior or suspected illegal substances which indicate the need for search of the family and/or belongings  3. Encourage verbalization of thoughts and concerns in a socially appropriate manner  4. Utilize positive, consistent limit setting strategies supporting safety of patient, staff and others  5. Encourage participation in the decision making process about the behavioral management agreement  6. If a visitor's behavior poses a threat to safety call refer to organization policy.  7. Initiate consult with , Psychosocial CNS, Spiritual Care as appropriate  Outcome: Progressing

## 2024-01-29 NOTE — GROUP NOTE
Group Therapy Note    Date: 1/29/2024    Group Start Time: 1500  Group End Time: 1600  Group Topic: Recreational    [facility] 3 ACUTE BEH[facility]    Lidya Thomason        Group Therapy Note    Attendees: 8       Patient's Goal:  To concentrate on selected task    Notes:  Pleasant,active participant-required prompting      Discipline Responsible: Recreational Therapist      Signature:  LIDYA THOMASON

## 2024-01-30 PROCEDURE — 1240000000 HC EMOTIONAL WELLNESS R&B

## 2024-01-30 PROCEDURE — 99232 SBSQ HOSP IP/OBS MODERATE 35: CPT | Performed by: PSYCHIATRY & NEUROLOGY

## 2024-01-30 PROCEDURE — 6370000000 HC RX 637 (ALT 250 FOR IP): Performed by: PSYCHIATRY & NEUROLOGY

## 2024-01-30 RX ADMIN — Medication 2 MG: at 20:55

## 2024-01-30 RX ADMIN — Medication 2 MG: at 07:58

## 2024-01-30 ASSESSMENT — PAIN SCALES - GENERAL: PAINLEVEL_OUTOF10: 0

## 2024-01-30 NOTE — PLAN OF CARE
Problem: Anxiety  Goal: Will report anxiety at manageable levels  Description: INTERVENTIONS:  1. Administer medication as ordered  2. Teach and rehearse alternative coping skills  3. Provide emotional support with 1:1 interaction with staff  1/30/2024 1722 by Sandra Winters RN  Outcome: Progressing

## 2024-01-30 NOTE — GROUP NOTE
Group Therapy Note    Date: 1/30/2024    Group Start Time: 1000  Group End Time: 1100  Group Topic: Topic Group    [facility] 3 ACUTE BEHAV [facility]    Lidya Thomason        Group Therapy Note    Attendees: 8       Patient's Goal:  To participate in relaxation activity      Status After Intervention:  Improved    Participation Level: Active Listener and Interactive    Participation Quality: Appropriate and Attentive      Speech:  normal      Affective Functioning: Congruent      Level of consciousness:  Alert, Oriented x4, and Attentive      Response to Learning: Progressing to goal      Endings: None Reported    Modes of Intervention: Activity      Discipline Responsible: Recreational Therapist      Signature:  LIDYA THOMASON

## 2024-01-30 NOTE — GROUP NOTE
Group Therapy Note    Date: 1/30/2024    Group Start Time: 1500  Group End Time: 1600  Group Topic: Recreational    [facility] 3 ACUTE [facility]    Lidya Thomason        Group Therapy Note    Attendees: 6       Patient's Goal:  To concentrate on selected task    Notes:  Pleasant-active participant-worked on selected task    Discipline Responsible: Recreational Therapist      Signature:  LIDYA THOMASON

## 2024-01-30 NOTE — PROGRESS NOTES
Yen was pleasant when approached, visible on the unit and social with staff and peers. She denied SI, HI, AVH and pain. She was med compliant. She requested no PRNs. She appears to be sleeping well.

## 2024-01-31 VITALS
WEIGHT: 110 LBS | HEART RATE: 90 BPM | RESPIRATION RATE: 16 BRPM | BODY MASS INDEX: 20.24 KG/M2 | TEMPERATURE: 97.9 F | OXYGEN SATURATION: 100 % | SYSTOLIC BLOOD PRESSURE: 109 MMHG | HEIGHT: 62 IN | DIASTOLIC BLOOD PRESSURE: 75 MMHG

## 2024-01-31 PROCEDURE — 99239 HOSP IP/OBS DSCHRG MGMT >30: CPT | Performed by: PSYCHIATRY & NEUROLOGY

## 2024-01-31 PROCEDURE — 6360000002 HC RX W HCPCS: Performed by: PSYCHIATRY & NEUROLOGY

## 2024-01-31 PROCEDURE — 6370000000 HC RX 637 (ALT 250 FOR IP): Performed by: PSYCHIATRY & NEUROLOGY

## 2024-01-31 RX ADMIN — Medication 2 MG: at 08:12

## 2024-01-31 RX ADMIN — RISPERIDONE 120 MG: KIT SUBCUTANEOUS at 11:58

## 2024-01-31 ASSESSMENT — PAIN SCALES - GENERAL: PAINLEVEL_OUTOF10: 0

## 2024-01-31 NOTE — PROGRESS NOTES
Writer met with patient in the hallway. Patient was walking to the dayroom and appeared calm/free from distress. Patient was cooperative with assessment. Patient is Aox4. Patient presents with a brightened affect and normal mood. Patient denies anxiety, depression, SI, HI, and AVH. Patient is medication and meal compliant. Patient received first injection of risperidone  mg subcutaneous injection in her LLQ in abdomen without issue. Q15 minute safety checks maintained.

## 2024-01-31 NOTE — BH NOTE
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          Hospital Area Unit Room/Bed    [facility] 3 GEN BEHAV HLTH 319/02              Hospital Account    Name Acct ID Class Status Primary Coverage   Tonia Edgar [id number] BEHAVIOR IP Open [facility] 4.0            Guarantor Account (for Hospital Account #[id number])    Name Relation to Pt Service Area Active? Acct Type   Tonia Edgar Self BS Yes Personal/Family   Address Phone     [address] [place] [address] 
Writer conducted family session between pt and pt mom. SW informed pt mother about follow up plans. Writer and pt discussed how psychosis alters perception and to rely on mom to alert her to concerning behavior because she may lack insight. Pt voiced understanding of follow up services and is future focused.      Stacy Wade, MSW student  
Writer spoke to pt mother, Lyric Edgar ([phone]). Lyric reports prior to hospitalization pt was exhibiting odd behavior such as talking to herself, not sleeping, showering constantly, taking people's belongings, being paranoid over the government watching her and stealing her identity and putting bleach in her mother's cup. Lyric reports pt has been exhibiting odd behavior for the last year. Lyric reports pt was previously hospitalized at [facility] in August 2022 for similar behaviors. Lyric reports when pt was discharged she had improved somewhat but refused to take medication after discharge. Lyric reports pt was seeing someone at [facility] but stopped because she stated that nothing is wrong with her. Lyric reports she found her daughter a 7 day group home but pt would not go so she initiated [employer]. Lyric reports pt can return home when she is stable but does have concerns over pt being left at home alone while she is at work. Lyric also requests information about resources for herself so she can best support her daughter.     Stacy Wade, MSW student   
Final    Potassium 01/22/2024 3.5  3.5 - 5.1 mmol/L Final    Chloride 01/22/2024 104  97 - 108 mmol/L Final    CO2 01/22/2024 28  21 - 32 mmol/L Final    Anion Gap 01/22/2024 6  5 - 15 mmol/L Final    Glucose 01/22/2024 81  65 - 100 mg/dL Final    BUN 01/22/2024 12  6 - 20 MG/DL Final    Creatinine 01/22/2024 0.72  0.55 - 1.02 MG/DL Final    Bun/Cre Ratio 01/22/2024 17  12 - 20   Final    Est, Glom Filt Rate 01/22/2024 >60  >60 ml/min/1.73m2 Final    Calcium 01/22/2024 8.7  8.5 - 10.1 MG/DL Final    Total Bilirubin 01/22/2024 0.3  0.2 - 1.0 MG/DL Final    ALT 01/22/2024 12  12 - 78 U/L Final    AST 01/22/2024 16  15 - 37 U/L Final    Alk Phosphatase 01/22/2024 80  45 - 117 U/L Final    Total Protein 01/22/2024 7.1  6.4 - 8.2 g/dL Final    Albumin 01/22/2024 3.7  3.5 - 5.0 g/dL Final    Globulin 01/22/2024 3.4  2.0 - 4.0 g/dL Final    Albumin/Globulin Ratio 01/22/2024 1.1  1.1 - 2.2   Final    WBC 01/22/2024 4.6  3.6 - 11.0 K/uL Final    RBC 01/22/2024 3.81  3.80 - 5.20 M/uL Final    Hemoglobin 01/22/2024 11.3 (L)  11.5 - 16.0 g/dL Final    Hematocrit 01/22/2024 33.9 (L)  35.0 - 47.0 % Final    MCV 01/22/2024 89.0  80.0 - 99.0 FL Final    MCH 01/22/2024 29.7  26.0 - 34.0 PG Final    MCHC 01/22/2024 33.3  30.0 - 36.5 g/dL Final    RDW 01/22/2024 13.2  11.5 - 14.5 % Final    Platelets 01/22/2024 193  150 - 400 K/uL Final    MPV 01/22/2024 11.8  8.9 - 12.9 FL Final    Nucleated RBCs 01/22/2024 0.0  0  WBC Final    nRBC 01/22/2024 0.00  0.00 - 0.01 K/uL Final    Neutrophils % 01/22/2024 53  32 - 75 % Final    Lymphocytes % 01/22/2024 33  12 - 49 % Final    Monocytes % 01/22/2024 9  5 - 13 % Final    Eosinophils % 01/22/2024 3  0 - 7 % Final    Basophils % 01/22/2024 1  0 - 1 % Final    Immature Granulocytes 01/22/2024 1 (H)  0.0 - 0.5 % Final    Neutrophils Absolute 01/22/2024 2.5  1.8 - 8.0 K/UL Final    Lymphocytes Absolute 01/22/2024 1.5  0.8 - 3.5 K/UL Final    Monocytes Absolute 01/22/2024 0.4  0.0 - 1.0 K/UL 
11.3 (L)  11.5 - 16.0 g/dL Final    Hematocrit 01/22/2024 33.9 (L)  35.0 - 47.0 % Final    MCV 01/22/2024 89.0  80.0 - 99.0 FL Final    MCH 01/22/2024 29.7  26.0 - 34.0 PG Final    MCHC 01/22/2024 33.3  30.0 - 36.5 g/dL Final    RDW 01/22/2024 13.2  11.5 - 14.5 % Final    Platelets 01/22/2024 193  150 - 400 K/uL Final    MPV 01/22/2024 11.8  8.9 - 12.9 FL Final    Nucleated RBCs 01/22/2024 0.0  0  WBC Final    nRBC 01/22/2024 0.00  0.00 - 0.01 K/uL Final    Neutrophils % 01/22/2024 53  32 - 75 % Final    Lymphocytes % 01/22/2024 33  12 - 49 % Final    Monocytes % 01/22/2024 9  5 - 13 % Final    Eosinophils % 01/22/2024 3  0 - 7 % Final    Basophils % 01/22/2024 1  0 - 1 % Final    Immature Granulocytes 01/22/2024 1 (H)  0.0 - 0.5 % Final    Neutrophils Absolute 01/22/2024 2.5  1.8 - 8.0 K/UL Final    Lymphocytes Absolute 01/22/2024 1.5  0.8 - 3.5 K/UL Final    Monocytes Absolute 01/22/2024 0.4  0.0 - 1.0 K/UL Final    Eosinophils Absolute 01/22/2024 0.1  0.0 - 0.4 K/UL Final    Basophils Absolute 01/22/2024 0.0  0.0 - 0.1 K/UL Final    Absolute Immature Granulocyte 01/22/2024 0.0  0.00 - 0.04 K/UL Final    Differential Type 01/22/2024 AUTOMATED    Final    hCG Qual 01/22/2024 Negative  NEG   Final    TSH, 3RD GENERATION 01/24/2024 2.04  0.36 - 3.74 uIU/mL Final    Hemoglobin A1C 01/24/2024 5.4  4.0 - 5.6 % Final    Estimated Avg Glucose 01/24/2024 108  mg/dL Final    Cholesterol, Total 01/24/2024 154  <200 MG/DL Final    Triglycerides 01/24/2024 53  <150 MG/DL Final    HDL 01/24/2024 61  MG/DL Final    LDL Calculated 01/24/2024 82.4  0 - 100 MG/DL Final    VLDL Cholesterol Calculated 01/24/2024 10.6  MG/DL Final    Chol/HDL Ratio 01/24/2024 2.5  0.0 - 5.0   Final         Assessment & Plan     The patient, Yen Edgar, is a 29 y.o.  female who presents at this time for treatment of the following diagnoses:  Psychosis, unspecified psychosis type (HCC)  ASSESSMENT: the patient presents with worsening thought 
Final    Alk Phosphatase 01/22/2024 80  45 - 117 U/L Final    Total Protein 01/22/2024 7.1  6.4 - 8.2 g/dL Final    Albumin 01/22/2024 3.7  3.5 - 5.0 g/dL Final    Globulin 01/22/2024 3.4  2.0 - 4.0 g/dL Final    Albumin/Globulin Ratio 01/22/2024 1.1  1.1 - 2.2   Final    WBC 01/22/2024 4.6  3.6 - 11.0 K/uL Final    RBC 01/22/2024 3.81  3.80 - 5.20 M/uL Final    Hemoglobin 01/22/2024 11.3 (L)  11.5 - 16.0 g/dL Final    Hematocrit 01/22/2024 33.9 (L)  35.0 - 47.0 % Final    MCV 01/22/2024 89.0  80.0 - 99.0 FL Final    MCH 01/22/2024 29.7  26.0 - 34.0 PG Final    MCHC 01/22/2024 33.3  30.0 - 36.5 g/dL Final    RDW 01/22/2024 13.2  11.5 - 14.5 % Final    Platelets 01/22/2024 193  150 - 400 K/uL Final    MPV 01/22/2024 11.8  8.9 - 12.9 FL Final    Nucleated RBCs 01/22/2024 0.0  0  WBC Final    nRBC 01/22/2024 0.00  0.00 - 0.01 K/uL Final    Neutrophils % 01/22/2024 53  32 - 75 % Final    Lymphocytes % 01/22/2024 33  12 - 49 % Final    Monocytes % 01/22/2024 9  5 - 13 % Final    Eosinophils % 01/22/2024 3  0 - 7 % Final    Basophils % 01/22/2024 1  0 - 1 % Final    Immature Granulocytes 01/22/2024 1 (H)  0.0 - 0.5 % Final    Neutrophils Absolute 01/22/2024 2.5  1.8 - 8.0 K/UL Final    Lymphocytes Absolute 01/22/2024 1.5  0.8 - 3.5 K/UL Final    Monocytes Absolute 01/22/2024 0.4  0.0 - 1.0 K/UL Final    Eosinophils Absolute 01/22/2024 0.1  0.0 - 0.4 K/UL Final    Basophils Absolute 01/22/2024 0.0  0.0 - 0.1 K/UL Final    Absolute Immature Granulocyte 01/22/2024 0.0  0.00 - 0.04 K/UL Final    Differential Type 01/22/2024 AUTOMATED    Final    hCG Qual 01/22/2024 Negative  NEG   Final    TSH, 3RD GENERATION 01/24/2024 2.04  0.36 - 3.74 uIU/mL Final    Hemoglobin A1C 01/24/2024 5.4  4.0 - 5.6 % Final    Estimated Avg Glucose 01/24/2024 108  mg/dL Final    Cholesterol, Total 01/24/2024 154  <200 MG/DL Final    Triglycerides 01/24/2024 53  <150 MG/DL Final    HDL 01/24/2024 61  MG/DL Final    LDL Calculated 01/24/2024

## 2024-01-31 NOTE — PROGRESS NOTES
Patient actively participated in Spirituality Group about waiting and trusting God in the Behavioral Health unit.  utilized music, lyrics to favorite songs, words of encouragement and affirmation, scripture, testimony, a devotional reading to facilitate the group discussion and enhance group dynamics.  Rev. Marycarmen Hernandez MDiv,

## 2024-01-31 NOTE — PLAN OF CARE
Problem: Discharge Planning  Goal: Discharge to home or other facility with appropriate resources  1/31/2024 1204 by Sabiha Tracy RN  Outcome: Adequate for Discharge  1/31/2024 0213 by Adalid Hou RN  Outcome: Progressing     Problem: Anxiety  Goal: Will report anxiety at manageable levels  Description: INTERVENTIONS:  1. Administer medication as ordered  2. Teach and rehearse alternative coping skills  3. Provide emotional support with 1:1 interaction with staff  Outcome: Adequate for Discharge     Problem: Coping  Goal: Pt/Family able to verbalize concerns and demonstrate effective coping strategies  Description: INTERVENTIONS:  1. Assist patient/family to identify coping skills, available support systems and cultural and spiritual values  2. Provide emotional support, including active listening and acknowledgement of concerns of patient and caregivers  3. Reduce environmental stimuli, as able  4. Instruct patient/family in relaxation techniques, as appropriate  5. Assess for spiritual pain/suffering and initiate Spiritual Care, Psychosocial Clinical Specialist consults as needed  1/31/2024 1204 by Sabiha Tracy RN  Outcome: Adequate for Discharge  1/31/2024 0213 by Adalid Hou RN  Outcome: Progressing     Problem: Confusion  Goal: Confusion, delirium, dementia, or psychosis is improved or at baseline  Description: INTERVENTIONS:  1. Assess for possible contributors to thought disturbance, including medications, impaired vision or hearing, underlying metabolic abnormalities, dehydration, psychiatric diagnoses, and notify attending LIP  2. [place] high risk fall precautions, as indicated  3. Provide frequent short contacts to provide reality reorientation, refocusing and direction  4. Decrease environmental stimuli, including noise as appropriate  5. Monitor and intervene to maintain adequate nutrition, hydration, elimination, sleep and activity  6. If unable to ensure safety without

## 2024-01-31 NOTE — PLAN OF CARE
Problem: Discharge Planning  Goal: Discharge to home or other facility with appropriate resources  Outcome: Progressing     Problem: Coping  Goal: Pt/Family able to verbalize concerns and demonstrate effective coping strategies  Description: INTERVENTIONS:  1. Assist patient/family to identify coping skills, available support systems and cultural and spiritual values  2. Provide emotional support, including active listening and acknowledgement of concerns of patient and caregivers  3. Reduce environmental stimuli, as able  4. Instruct patient/family in relaxation techniques, as appropriate  5. Assess for spiritual pain/suffering and initiate Spiritual Care, Psychosocial Clinical Specialist consults as needed  Outcome: Progressing

## 2024-01-31 NOTE — GROUP NOTE
Group Therapy Note    Date: 1/31/2024    Group Start Time: 1000  Group End Time: 1100  Group Topic: Topic Group    [facility] 3 ACUTE BEHAV [facility]    Lidya Thomason        Group Therapy Note    Attendees: 6       Patient's Goal:  To learn about resilience      Status After Intervention:  Improved    Participation Level: Active Listener and Interactive    Participation Quality: Appropriate, Attentive, Sharing, and Supportive      Speech:  normal      Thought Process/Content: Logical      Affective Functioning: Congruent      Mood: euthymic      Level of consciousness:  Alert, Oriented x4, and Attentive      Response to Learning: Progressing to goal      Endings: None Reported    Modes of Intervention: Education and Problem-solving      Discipline Responsible: Recreational Therapist      Signature:  LIDYA THOMASON

## 2024-01-31 NOTE — DISCHARGE INSTRUCTIONS
DISCHARGE SUMMARY    NAME:Yen Edgar  : 1995  MRN: [id number]    The patient Yen Edgar exhibits the ability to control behavior in a less restrictive environment.  Patient's level of functioning is improving.  No assaultive/destructive behavior has been observed for the past 24 hours.  No suicidal/homicidal threat or behavior has been observed for the past 24 hours.  There is no evidence of serious medication side effects.  Patient has not been in physical or protective restraints for at least the past 24 hours.    If weapons involved, how are they secured? No access    Is patient aware of and in agreement with discharge plan? Yes    Arrangements for medication:  Prescriptions sent to pharmacy     Copy of discharge instructions to provider?:  Yes    Arrangements for transportation home:  Mom    Keep all follow up appointments as scheduled, continue to take prescribed medications per physician instructions.  Mental health crisis number:  911 or your local mental health crisis line number at [phone]      Mental Health Emergency WARM LINE      [phone] ([phone])      M-F: 9am to 9pm      Sat & Sun: 5pm - 9pm  National suicide prevention lines:                             0-477-QWQHNYD ([phone])       [phone] ([phone])    Crisis Text Line:  Text HOME to [id number]

## 2024-01-31 NOTE — PROGRESS NOTES
Pharmacist Discharge Medication Reconciliation    Discharging Provider: Gina EDDY     Significant PMH:   Past Medical History:   Diagnosis Date    Asthma     Second hand smoke exposure     Seizure (HCC)     mother unsure what kind of seizure, last one was in 1st grade     Chief Complaint for this Admission:   Chief Complaint   Patient presents with    Mental Health Problem     Allergies: Amoxicillin    Discharge Medications:   Current Discharge Medication List        START taking these medications    Details   risperiDONE ER (PERSERIS) 120 MG PRSY subcutaneous injection Inject 0.8 mLs into the skin every 28 days Next injection due on 02/28/24  Qty: 1 each, Refills: 1           First dose of Perseris 120 mg given 1/31 prior to discharge - next dose due 2/28     The patient's chart, MAR and AVS were reviewed by Daya Hudson [facility].

## 2024-02-01 NOTE — DISCHARGE SUMMARY
4.6  3.6 - 11.0 K/uL Final    RBC 01/22/2024 3.81  3.80 - 5.20 M/uL Final    Hemoglobin 01/22/2024 11.3 (L)  11.5 - 16.0 g/dL Final    Hematocrit 01/22/2024 33.9 (L)  35.0 - 47.0 % Final    MCV 01/22/2024 89.0  80.0 - 99.0 FL Final    MCH 01/22/2024 29.7  26.0 - 34.0 PG Final    MCHC 01/22/2024 33.3  30.0 - 36.5 g/dL Final    RDW 01/22/2024 13.2  11.5 - 14.5 % Final    Platelets 01/22/2024 193  150 - 400 K/uL Final    MPV 01/22/2024 11.8  8.9 - 12.9 FL Final    Nucleated RBCs 01/22/2024 0.0  0  WBC Final    nRBC 01/22/2024 0.00  0.00 - 0.01 K/uL Final    Neutrophils % 01/22/2024 53  32 - 75 % Final    Lymphocytes % 01/22/2024 33  12 - 49 % Final    Monocytes % 01/22/2024 9  5 - 13 % Final    Eosinophils % 01/22/2024 3  0 - 7 % Final    Basophils % 01/22/2024 1  0 - 1 % Final    Immature Granulocytes 01/22/2024 1 (H)  0.0 - 0.5 % Final    Neutrophils Absolute 01/22/2024 2.5  1.8 - 8.0 K/UL Final    Lymphocytes Absolute 01/22/2024 1.5  0.8 - 3.5 K/UL Final    Monocytes Absolute 01/22/2024 0.4  0.0 - 1.0 K/UL Final    Eosinophils Absolute 01/22/2024 0.1  0.0 - 0.4 K/UL Final    Basophils Absolute 01/22/2024 0.0  0.0 - 0.1 K/UL Final    Absolute Immature Granulocyte 01/22/2024 0.0  0.00 - 0.04 K/UL Final    Differential Type 01/22/2024 AUTOMATED    Final    hCG Qual 01/22/2024 Negative  NEG   Final    TSH, 3RD GENERATION 01/24/2024 2.04  0.36 - 3.74 uIU/mL Final    Hemoglobin A1C 01/24/2024 5.4  4.0 - 5.6 % Final    Estimated Avg Glucose 01/24/2024 108  mg/dL Final    Cholesterol, Total 01/24/2024 154  <200 MG/DL Final    Triglycerides 01/24/2024 53  <150 MG/DL Final    HDL 01/24/2024 61  MG/DL Final    LDL Calculated 01/24/2024 82.4  0 - 100 MG/DL Final    VLDL Cholesterol Calculated 01/24/2024 10.6  MG/DL Final    Chol/HDL Ratio 01/24/2024 2.5  0.0 - 5.0   Final     No results found.                DISPOSITION:    Home. Patient to f/u with psychiatric and psychotherapy appointments. Patient is to f/u with all

## 2024-02-12 ENCOUNTER — HOSPITAL ENCOUNTER (OUTPATIENT)
Facility: HOSPITAL | Age: 29
Setting detail: RECURRING SERIES
Discharge: HOME | End: 2024-02-15
Payer: COMMERCIAL

## 2024-02-12 VITALS
OXYGEN SATURATION: 100 % | TEMPERATURE: 97.8 F | DIASTOLIC BLOOD PRESSURE: 70 MMHG | SYSTOLIC BLOOD PRESSURE: 104 MMHG | HEART RATE: 74 BPM

## 2024-02-12 PROCEDURE — 90853 GROUP PSYCHOTHERAPY: CPT

## 2024-02-12 PROCEDURE — 90832 PSYTX W PT 30 MINUTES: CPT

## 2024-02-12 NOTE — SUICIDE SAFETY PLAN
[facility]       Emergency Services Address: [address]       Emergency Services Phone: [phone]     Making the environment safe: How can I make my environment (house/apartment/living space) safer? For example, can I remove guns, medications, and other items?  1. Place safety plan in room where it accessible   2. Cleaning room

## 2024-02-12 NOTE — BH NOTE
Partial Hospitalization Program Individual Psychotherapy Note      Diagnosis: F20.1    Goal: Intake assessment and tx plan        Psychotherapy Session    Start time: 10:00  Stop time: 10:30        Patient Mental Status and Mood/Affect:Calm and Suspicious    Patient Behavior and Appearance: Cooperativeshows no evidence of impairment    Intervention/Techniques: Informed, Validated/Supported, Reflected, Guided, Prompted/Cued, Listened/Empathized, and Promoted Peer Support    Focus of Session/Patient Response and Progress Towards Goal:      met with patient for intake assessment. Pt denied SI/HI upon assessment. Pt signed LANCE for mother. SW did not complete COWS, Audit-C or CIWA due to pt denying alcohol and opiate use. Pt was oriented x4, engaged, and cooperative. Pt denied SI/HI. Patient consented to treatment as evidenced by signing documentation and verbal agreement.       Narrative:     Patient reported feeling anxious, depressed, and fearful. Patient expressed the need for assistance with reframing negative thoughts and practicing coping skills to keep her and the people around her safe. Patient was referred to [facility] by [facility] unit. Patient was honest and open with clinician throughout screenings and intakes. Patient had no questions regarding assessment. Following assessment, patient collaborated with this writer to complete safety and treatment plans. Patient reviewed and confirmed short term goal #1 as learning how to reframe cognitive distortions. Patient reviewed and confirmed short term goal #2 as assessing safety. The patient was made aware that the tx plan is a working document and will be updated every week. Patient verbally agreed and signed with no questions.      Patient is appropriate for discharge with recommended date of 3/15. Patient will meet with this writer on 2/19 for a review of the treatment plan.

## 2024-02-12 NOTE — BH NOTE
Pt came back into office at 3:02 stating transportation had not yet arrived.    Writer called [facility] transportation ([phone]). Rep contacted transportation provider and confirmed ETA o f15 minutes. Writer informed pt and pt left office.    Pt came back into office at 3:40pm asking for transportation number due to ride still not arriving. Writer gave member transportation number as listed above and advised member that the zip-code in the automated phone system was not correct, and to keep giving the zip-code until connected to a representative.    DEBORA Mack

## 2024-02-12 NOTE — GROUP NOTE
Group Therapy Note    Date: 2/12/2024    Group Start Time:  1:10 PM  Group End Time:  2:00 PM  Group Topic: Psychoeducation    [facility]    Heather Landrum        Group Therapy Note  This writer facilitated a psychoeducational group that educated patients on guilt versus shame. This writer provided education by discussing the differences between healthy guilt, unhealthy guilt, and shame. Patients then reflected on their experiences and discussed their interpretations. This writer used open ended questions and challenging thoughts to promote discussion.     Attendees: 6       Patient's Goal:  To learn the differences between guilt versus shame and how to process each in a healthy manner.    Notes:  The patient presented well to the session. This writer opened the discussion by prompting patients to share their beliefs on the differences between shame and guilt. The patient smiled as she listened to her peers share their thoughts. The patient was encouraged to reflect on a time she experienced guilt or shame. The patient will continue to work on identifying and coping with guilt and shame in further groups.     Status After Intervention:  Unchanged    Participation Level: Active Listener and Minimal    Participation Quality: Appropriate and Attentive      Speech:  normal      Thought Process/Content: Logical  Linear      Affective Functioning: Congruent      Mood: euthymic      Level of consciousness:  Alert, Oriented x4, and Attentive      Response to Learning: Able to verbalize current knowledge/experience and Capable of insight      Endings: None Reported    Modes of Intervention: Education, Support, Socialization, and Exploration      Discipline Responsible: /Counselor      Signature:  Heather Landrum    
                                                                      Group Therapy Note    Date: 2/12/2024    Group Start Time:  2:00 PM  Group End Time:  2:45 PM  Group Topic: Wrap-Up    [facility]    Katy Patel MSW        Group Therapy Note    Writer facilitated a community wrap up group focused on assessing for safety, processing of stressors, and coping skills practice. Writer facilitated a symptom and safety check in using the afternoon check in form. Writer provided space for pts who were not done processing from the last group to share and receive feedback from their peers. Writer facilitated the shake it out activity for regulation and a catharsis exercise. Writer prompted brief processing discussion afterwards.     Attendees: 7       Patient's Goal:   disclosing safety concerns, engaging with peer support, and practicing coping skills.        Notes:  Pt denied SI/HI on the check in form. Pt presented as attentive as evidenced by appropriate eye contact and interacting with peers appropriately. Pt engaged appropriately in the shake it out and catharsis exercises and reported it to be helpful. Pt will continue to work on disclosing safety concerns, engaging with peer support, and practicing coping skills.        Status After Intervention:  Unchanged    Participation Level: Active Listener and Interactive    Participation Quality: Appropriate and Attentive      Speech:  normal      Thought Process/Content: Logical      Affective Functioning: Congruent      Mood: euthymic      Level of consciousness:  Alert, Oriented x4, and Attentive      Response to Learning: Able to verbalize current knowledge/experience and Progressing to goal      Endings: None Reported    Modes of Intervention: Support, Socialization, and Activity      Discipline Responsible: /Counselor      Signature:  DEBORA HUNT    
                                                                      Group Therapy Note    Date: 2/12/2024    Group Start Time: 10:40 AM  Group End Time: 11:30 AM  Group Topic: Cognitive Skills    [facility] Katy Cagle MSW        Group Therapy Note    Writer facilitated cognitive skills group. Writer opened with group introductions for a new peer joining the group. Writer transitioned to education on the cycle of avoidance, triggers for avoidance, and avoidance strategies. Writer provided handouts to pts. Writer encouraged engagement through open ended questions, prompting pts to read from the handouts aloud, and encouraging pts to connect information to their life experiences. Writer prompted pts to work through the handout steps on recognizing and challenging avoidance and encouraged pts to share their responses.    Attendees: 6       Patient's Goal:  skills for understanding and challenging avoidance patterns.      Notes: Pt presented as attentive and engaged as evidenced by active participation when called upon to share. Pt engaged appropriately in peer introductions and identified dancing as her talent. Pt shared her response to the handout and identified patterns of avoiding feeling her emotions for fear of disappointing others or herself. Pt will continue to work on skills for understanding and challenging avoidance patterns.      Status After Intervention:  Unchanged    Participation Level: Active Listener and Interactive    Participation Quality: Appropriate, Attentive, and Sharing      Speech:  normal      Thought Process/Content: Logical      Affective Functioning: Congruent      Mood: euthymic      Level of consciousness:  Alert, Oriented x4, and Attentive      Response to Learning: Able to verbalize current knowledge/experience, Capable of insight, and Progressing to goal      Endings: None Reported    Modes of Intervention: Education      Discipline Responsible: Social 
                                                                      Group Therapy Note    Date: 2/12/2024    Group Start Time: 12:00 PM  Group End Time:  1:00 PM  Group Topic: Psychoeducation    [facility]    Jules Lozoya LCSW        Group Therapy Note    This writer facilitated the treatment planning group. The patients were encouraged to participate in a music mindfulness practice at the start of the group. The patients were provided educational materials on reflective listening. The patient were encouraged to practice reflective listening by using examples that accompanied educational materials.     Attendees: 7       Patient's Goal:  Participate in music mindfulness activity, participate in education and reflective listening activity.     Notes:  The patient participated in music mindfulness activity. The patient was attentive to educational materials and completed the reflective listening activity.  The patient expressed she was having difficulty staying awake in the group and excused her self to splash water on her face multiple time. The patient will continue to be open to educational materials and practice during the treatment planning group.     Status After Intervention:  Unchanged    Participation Level: Active Listener and Interactive    Participation Quality: Appropriate, Attentive, and Sharing      Speech:  normal      Thought Process/Content: Logical      Affective Functioning: Congruent      Mood: euthymic      Level of consciousness:  Alert, Oriented x4, and Attentive      Response to Learning: Able to verbalize current knowledge/experience and Capable of insight      Endings: None Reported    Modes of Intervention: Activity      Discipline Responsible: /Counselor      Signature:  Jules Lozoya LCSW    
Discharged
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
## 2024-02-13 ENCOUNTER — HOSPITAL ENCOUNTER (OUTPATIENT)
Facility: HOSPITAL | Age: 29
Setting detail: RECURRING SERIES
Discharge: HOME | End: 2024-02-16
Payer: COMMERCIAL

## 2024-02-13 VITALS
SYSTOLIC BLOOD PRESSURE: 107 MMHG | HEART RATE: 70 BPM | TEMPERATURE: 97.8 F | DIASTOLIC BLOOD PRESSURE: 84 MMHG | OXYGEN SATURATION: 100 %

## 2024-02-13 PROCEDURE — 90853 GROUP PSYCHOTHERAPY: CPT

## 2024-02-13 NOTE — GROUP NOTE
Group Therapy Note    Date: 2/13/2024    Group Start Time:  1:10 PM  Group End Time:  2:00 PM  Group Topic: Psychoeducation    [facility]    Jules Lozoya LCSW        Group Therapy Note    The patients were encouraged to join this writer outside for a mindfulness journaling activity. The patients were asked to return to the group room and share experiencing journaling.     Attendees: 5       Patient's Goal:  Practice mindfulness journal activity.      Notes: The patient participated in the mindfulness journaling activity. The patient shared reflection on participation in the activity. The patient introduced the topic of her pet peeve being liars and participated in ensuing conversation. The patient will continue to practice mindfulness in the cognitive skills group.     Status After Intervention:  Unchanged    Participation Level: Active Listener and Interactive    Participation Quality: Appropriate, Attentive, Sharing, and Supportive      Speech:  normal      Thought Process/Content: Logical  Delusional      Affective Functioning: Congruent      Mood: euthymic      Level of consciousness:  Alert, Oriented x4, and Attentive      Response to Learning: Able to change behavior      Endings: None Reported    Modes of Intervention: Support, Socialization, and Activity      Discipline Responsible: /Counselor      Signature:  Jules Lozoya LCSW    
                                                                      Group Therapy Note    Date: 2/13/2024    Group Start Time:  2:00 PM  Group End Time:  2:45 PM  Group Topic: Wrap-Up    [facility]    Heather Landrum        Group Therapy Note  This writer facilitated a wrap-up group by encouraging the patients to reflect on their day. Each patient was asked to complete the Afternoon Wrap Up Form. After allowing time for this, this writer encouraged patients to reflect on what makes them happy and what topics have been helpful. Patients then discussed their thoughts with their peers.     Attendees: 5       Patient's Goal:  To reflect on the day and identify what makes them happy.    Notes:  The patient presented well to the session. The patient completed the wrap up form and did not identify any SI or harm to others. She shared that self-care makes her happy when she has symptoms of depression. She then shared that journaling and mindfulness have been helpful coping strategies for her. This writer prompted the patient to reflect on how journaling has been helpful. The patient will continue to work on identifying healthy coping skills in further groups.     Status After Intervention:  Unchanged    Participation Level: Active Listener and Interactive    Participation Quality: Appropriate, Attentive, and Sharing      Speech:  normal      Thought Process/Content: Logical  Linear      Affective Functioning: Congruent      Mood: euthymic      Level of consciousness:  Alert, Oriented x4, and Attentive      Response to Learning: Able to verbalize current knowledge/experience, Able to verbalize/acknowledge new learning, Able to retain information, and Capable of insight      Endings: None Reported    Modes of Intervention: Support, Socialization, Exploration, and Clarifying      Discipline Responsible: /Counselor      Signature:  DEBORA Adams Student    
                                                                      Group Therapy Note    Date: 2/13/2024    Group Start Time:  9:00 AM  Group End Time:  9:40 AM  Group Topic: Community Meeting    [facility]    Katy Patel MSW        Group Therapy Note    Writer facilitated the morning check in community group focused on evaluating presentation, assessing for safety, processing recent events, and identifying emotions in the body. Writer facilitated a symptom and safety check in using the morning check in form.  Writer encouraged patients to share events in their lives as well as identify and discuss recent or upcoming events causing stress or caesar. Writer prompted and supported peer feedback. Writer closed with a creative activity for identifying where people feel emotions in their body.     Attendees: 3       Patient's Goal:  disclosing safety concerns, engaging with peer support, and practicing emotion identification.        Notes:  Pt denied SI/HI/ANITHA on the check in form. Pt provided appropriate feedback to peers and asked relevant questions. Pt recommended positive self-talk and stretching as ways to manage difficult emotions. Pt shared about her recent experiences. Pt was observed to work on the emotion identification activity. Pt will continue to work on disclosing safety concerns, engaging with peer support, and practicing emotion identification.        Status After Intervention:  Unchanged    Participation Level: Active Listener and Interactive    Participation Quality: Appropriate, Attentive, and Sharing      Speech:  normal      Thought Process/Content: Logical      Affective Functioning: Congruent      Mood: euthymic      Level of consciousness:  Alert, Oriented x4, and Attentive      Response to Learning: Able to verbalize current knowledge/experience and Progressing to goal      Endings: None Reported    Modes of Intervention: Support and Socialization      Discipline Responsible: Social 
                                                                      Group Therapy Note    Date: 2/13/2024    Group Start Time:  9:40 AM  Group End Time: 10:40 AM  Group Topic: Process Group - Outpatient    [facility]    Lulú Decker MSW        Group Therapy Note    Patients were given time to finish sharing from previous group activity coloring in where they feel emotions in the body. Patients were offered space and time to process with the group and declined. Patients were given a sheet asking them to reflect on personal strengths, what they look like, how they have demonstrated them, and what they would like to work on.    Attendees: 4       Patient's Goal:  Identify strengths, discuss examples, reflect on experiences    Notes:  The patient engaged actively in process group. The patient discussed empathy as a strength and finding balance. The patient discussed how a recent illness impacted how she set boundaries and engaged in self-care differently. The patient offered feedback and encouragement to peers. The patient discussed her support system. The patient will continue to practice identifying and using strengths.    Status After Intervention:  Unchanged    Participation Level: Active Listener and Interactive    Participation Quality: Appropriate, Attentive, Sharing, and Supportive      Speech:  normal      Thought Process/Content: Logical  Linear      Affective Functioning: Congruent      Mood: euthymic      Level of consciousness:  Alert, Oriented x4, and Attentive      Response to Learning: Able to verbalize current knowledge/experience, Able to verbalize/acknowledge new learning, Capable of insight, and Progressing to goal      Endings: None Reported    Modes of Intervention: Support      Discipline Responsible: /Counselor      Signature:  DEBORA Mack    
                                                                      Group Therapy Note    Date: 2/13/2024    Group Start Time: 12:00 PM  Group End Time:  1:00 PM  Group Topic: Psychoeducation    [facility]    Katy Patel MSW        Group Therapy Note    Writer facilitated psychoeducation group focused on learning about the inner critic. Writer provided handouts on identifying types of inner critics and processing the effects of the inner critic on one's life. Writer encouraged participation through open ended questions, directly prompting pts, and prompting pts to take turns reading aloud from the handout. Writer encouraged sharing and processing through open ended questions. Writer prompted peer feedback. Writer closed with having pts identify coping skills for dealing with the inner critic.    Attendees: 5       Patient's Goal:  identifying, processing, and managing the pt’s own inner critic.      Notes: Pt presented as attentive and engaged as evidenced by appropriate eye contact and thorough responses. Pt read for peers and shared her responses to the handout. Pt identified herself as experiencing the guilt critic frequently. Pt will continue to work on identifying, processing, and managing the pt’s own inner critic.      Status After Intervention:  Unchanged    Participation Level: Active Listener and Interactive    Participation Quality: Appropriate, Attentive, and Sharing      Speech:  normal      Thought Process/Content: Logical      Affective Functioning: Congruent      Mood: euthymic      Level of consciousness:  Alert, Oriented x4, and Attentive      Response to Learning: Able to verbalize current knowledge/experience and Progressing to goal      Endings: None Reported    Modes of Intervention: Education and Support      Discipline Responsible: /Counselor      Signature:  DEBORA HUNT    
Exploration, and Clarifying      Discipline Responsible: /Counselor      Signature:  DEBORA Adams Student
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
## 2024-02-14 ENCOUNTER — HOSPITAL ENCOUNTER (OUTPATIENT)
Facility: HOSPITAL | Age: 29
Setting detail: RECURRING SERIES
Discharge: HOME | End: 2024-02-17
Payer: COMMERCIAL

## 2024-02-14 VITALS
OXYGEN SATURATION: 99 % | SYSTOLIC BLOOD PRESSURE: 111 MMHG | TEMPERATURE: 98.4 F | HEART RATE: 71 BPM | DIASTOLIC BLOOD PRESSURE: 72 MMHG

## 2024-02-14 PROCEDURE — 90853 GROUP PSYCHOTHERAPY: CPT

## 2024-02-14 NOTE — GROUP NOTE
Group Therapy Note    Date: 2/14/2024    Group Start Time:  2:00 PM  Group End Time:  2:45 PM  Group Topic: Wrap-Up    [facility]    Jules Lozoya LCSW        Group Therapy Note    This writer facilitated the wrap up group. The patients were encouraged to completed the afternoon assessment to prioritize self-care or practicing a coping strategy.     Attendees: 7       Patient's Goal: Identify mood and plan for self-care      The patient completed the afternoon wrap up sheet and denied SI/HI. The patient completed an afternoon agenda focused on planning to practice coping skills or self-care activity. The patient shared she will work on school work and have dinner for self-care. The patient will continue to identify mood and plan for self-care in the wrap up group.      Status After Intervention:  Unchanged    Participation Level: Active Listener and Interactive    Participation Quality: Appropriate, Attentive, Sharing, and Supportive      Speech:  normal      Thought Process/Content: Logical      Affective Functioning: Congruent      Mood: euthymic      Level of consciousness:  Alert, Oriented x4, and Attentive      Response to Learning: Able to verbalize current knowledge/experience, Capable of insight, and Progressing to goal      Endings: None Reported    Modes of Intervention: Support, Socialization, and Exploration      Discipline Responsible: /Counselor      Signature:  Jules Lozoya LCSW    
                                                                      Group Therapy Note    Date: 2/14/2024    Group Start Time:  9:00 AM  Group End Time:  9:40 AM  Group Topic: Community Meeting    [facility]    Katy Patel MSW        Group Therapy Note    Writer facilitated the morning check in community group focused on evaluating presentation, assessing for safety, reviewing group expectations, and processing recent events. Writer facilitated a symptom and safety check in using the morning check in form. Writer prompted pts to identify group rules and group etiquette as well as provided education on rules and the reasons for these rules. Writer encouraged patients to share events in their lives, both stressful or prompting caesar. Writer prompted peer feedback. Writer closed by prompting pts to identify 1 positive event from the last week.    Attendees: 5       Patient's Goal:   disclosing safety concerns, understanding group rules, and engaging with peer support.        Notes:  Pt denied SI/HI/ANITHA on the check in form. Pt presented as quiet and attentive as evidenced by minimal sharing though maintaining appropriate eye contact and looking at others as they share. Pt reported her positive event as her gum health improving. Pt shared a related story from her childhood which has helped her view her gum health issues in a humorous light. Pt will continue to work on disclosing safety concerns, understanding group rules, and engaging with peer support.        Status After Intervention:  Unchanged    Participation Level: Active Listener    Participation Quality: Appropriate, Attentive, and Sharing      Speech:  normal      Thought Process/Content: Logical      Affective Functioning: Congruent      Mood: euthymic      Level of consciousness:  Alert, Oriented x4, and Attentive      Response to Learning: Able to verbalize current knowledge/experience and Progressing to goal      Endings: None Reported    Modes of 
                                                                      Group Therapy Note    Date: 2/14/2024    Group Start Time:  9:40 AM  Group End Time: 10:30 AM  Group Topic: Process Group - Outpatient    [facility]    Lulú Decker MSW        Group Therapy Note    Patients were given space and time to openly process with peers, and declined. Patients were given a sheet asking to reflect on their lives as a sunflower metaphor. Patients were asked to reflect on challenges they have learned from (dirt), values and choices that give them strength (stem), goals and aspirations (petals), and times when they flourish (sunshine). Patients were asked to share their reflections with peers.    Attendees: 6       Patient's Goal:  Reflect on experiences, values, strengths, and supports    Notes:  The patient engaged actively in process group. The patient discussed her lack of trust in others and how that impacts her interactions. The patient discussed feelings of anxiety surrounding finishing her degree, and feelings of overwhelm surrounding her goals. The patient was receptive to feedback and support from peers. The patient will continue to practice processing thoughts and feelings.    Status After Intervention:  Unchanged    Participation Level: Active Listener and Interactive    Participation Quality: Appropriate, Attentive, and Sharing      Speech:  normal      Thought Process/Content: Logical  Linear      Affective Functioning: Congruent      Mood: euthymic      Level of consciousness:  Alert, Oriented x4, and Attentive      Response to Learning: Able to verbalize current knowledge/experience, Able to retain information, Capable of insight, and Progressing to goal      Endings: None Reported    Modes of Intervention: Support      Discipline Responsible: /Counselor      Signature:  DEBORA Mack    
                                                                      Group Therapy Note    Date: 2/14/2024    Group Start Time: 10:40 AM  Group End Time: 11:30 AM  Group Topic: Cognitive Skills    [facility]    Maureen Irizarry MSW        Group Therapy Note    This writer facilitated group on relationship green and red flags. Patients were requested to draw on a piece a paper one person that provides them safety. Patients were encouraged to jot their actions, feelings, and physical sensations associated with the person. Patients were then requested to draw words and people that do not provide them safety. This writer asked them to reflect on the activity and passed out worksheet on red and green flags. Patients were encouraged to read and highlight flags that they want to address or work on.       Attendees: 7       Patient's Goal:  To learn and identify relationship green and red flags, to improve social connections    Notes:  Patient was present and engaged in group. Patient identified feeling nurtured by safe person via physical touch, acts of service, and verbal affirmations. Patient followed along with worksheet and identified flags to work on. Patient was receptive to content. Patient was supportive towards peers as evidenced by active listening and will continue to work towards goal.     Status After Intervention:  Improved    Participation Level: Active Listener    Participation Quality: Appropriate      Speech:  normal      Thought Process/Content: Logical      Affective Functioning: Congruent      Mood: depressed      Level of consciousness:  Alert and Oriented x4      Response to Learning: Able to retain information, Capable of insight, and Progressing to goal      Endings: None Reported    Modes of Intervention: Education, Support, and Socialization      Discipline Responsible: /Counselor       Signature:  DEBORA Fine    
                                                                      Group Therapy Note    Date: 2/14/2024    Group Start Time: 12:00 PM  Group End Time:  1:00 PM  Group Topic: Relapse Prevention    [facility]    Jules Lozoya LCSW        Group Therapy Note    This writer facilitated the relapse prevention group. The patients were encouraged to participate in a [place] activity to share appreciation for themselves and each other by writing cards to themselves and each other.     Attendees: 7       Patient's Goal: Tony's day activity      Notes: The patient participated and completed the Tony's Day activity. The patient identified gratitude and love for self and for peers as part of the exercise. The patient expressed gratitude for her peers welcoming her.  The patient will continue to practice gratitude during the relapse prevention group.       Status After Intervention:  Unchanged    Participation Level: Active Listener and Interactive    Participation Quality: Appropriate, Attentive, Sharing, and Supportive      Speech:  normal      Thought Process/Content: Logical      Affective Functioning: Congruent      Mood: euthymic      Level of consciousness:  Alert and Oriented x4      Response to Learning: Able to verbalize current knowledge/experience, Capable of insight, and Progressing to goal      Endings: None Reported    Modes of Intervention: Support, Socialization, and Activity      Discipline Responsible: /Counselor      Signature:  Jules Lozoya LCSW    
Specialist      Signature:  DEBORA Mack

## 2024-02-15 ENCOUNTER — HOSPITAL ENCOUNTER (OUTPATIENT)
Facility: HOSPITAL | Age: 29
Setting detail: RECURRING SERIES
Discharge: HOME | End: 2024-02-18
Payer: COMMERCIAL

## 2024-02-15 VITALS
HEART RATE: 76 BPM | DIASTOLIC BLOOD PRESSURE: 81 MMHG | SYSTOLIC BLOOD PRESSURE: 115 MMHG | OXYGEN SATURATION: 100 % | TEMPERATURE: 97.8 F

## 2024-02-15 PROCEDURE — 90853 GROUP PSYCHOTHERAPY: CPT

## 2024-02-15 NOTE — BH NOTE
Therapy Note     Date: 2/15/2024     Group Start Time:  12:00 PM  Group End Time:  1:00 PM  Group Topic: Community Meeting     [facility]      Group Therapy Note     Writer facilitated community group focused on process of anger and how we react mentally and physically when triggered.      Writer facilitated a symptom and safety check in. Writer encouraged patients to share events in their lives, to include triggers for anger, stress, sadness, or anxiety as well as sources of excitement, caesar, or pride. Writer prompted and supported peer feedback through therapeutic use of silence, open ended questions, and follow up questions.     Attendees: 11        Patient's Goal:   To appropriately process the emotion of anger.      Notes:  Patient will continue to work on appropriately processing emotions as evidenced by practicing coping skills, self-care and advocating for their needs.          Status After Intervention:  Unchanged     Participation Level: Active Listener     Participation Quality: Appropriate and Attentive        Speech:  normal        Thought Process/Content: Logical        Affective Functioning: Congruent        Mood: euthymic        Level of consciousness:  Alert, Oriented x4, and Attentive        Response to Learning: Able to verbalize current knowledge/experience and Progressing to goal        Endings: None Reported     Modes of Intervention: Support and Processing         Discipline Responsible: /Counselor        Signature: Pau CAZARES student Intern

## 2024-02-15 NOTE — GROUP NOTE
Group Therapy Note    Date: 2/15/2024    Group Start Time:  9:00 AM  Group End Time:  9:40 AM  Group Topic: Community Meeting    [facility]    Katy Patel MSW        Group Therapy Note    Writer facilitated the morning check in community group focused on evaluating presentation, assessing for safety, and processing recent events. Writer facilitated a symptom and safety check in using the morning check in form.  Writer encouraged patients to share events in their lives, to include triggers for stress, sadness, or anxiety as well as sources of excitement, caesar, or pride. Writer prompted and supported peer feedback through therapeutic use of silence, open ended questions, and follow up questions.    Attendees: 6       Patient's Goal:  disclosing safety concerns and engaging with peer feedback.     Notes:  Pt denied SI/HI/ANITHA on the check in form. Pt presented as attentive as evidenced by eye contact. Pt identified dance as activity which makes her feel safe and comforted. Pt discussed how it helps her express her emotions. Pt asked appropriate questions of a peer who shared that hobby. Pt will continue to work on disclosing safety concerns and engaging with peer feedback.     Status After Intervention:  Unchanged    Participation Level: Active Listener and Interactive    Participation Quality: Appropriate, Attentive, and Sharing      Speech:  normal      Thought Process/Content: Logical      Affective Functioning: Congruent      Mood: euthymic      Level of consciousness:  Alert, Oriented x4, and Attentive      Response to Learning: Able to verbalize current knowledge/experience and Progressing to goal      Endings: None Reported    Modes of Intervention: Support and Socialization      Discipline Responsible: /Counselor      Signature:  DEBORA HUNT

## 2024-02-15 NOTE — GROUP NOTE
Group Therapy Note    Date: 2/15/2024    Group Start Time:  9:40 AM  Group End Time: 10:30 AM  Group Topic: Process Group - Outpatient    [facility]    Jules Lozoya LCSW        Group Therapy Note    The patients were encouraged to participate in a movement exercise to ground at the start of the group. The patient were provided with positive psychology prompts to think about success and accomplishments over the last year and to share them with peers.       Attendees: 6       Patient's Goal:  Ground and reflect on success    Notes:  The patient participated in the movement grounding exercise. The patient was open to completing the reflection exercise. The patient shared that she had made accomplishments in dance, modeling, and with her education. The patient will continue to ground and reflect in the process group.     Status After Intervention:  Unchanged    Participation Level: Active Listener and Interactive    Participation Quality: Appropriate, Attentive, Sharing, and Supportive      Speech:  normal      Thought Process/Content: Logical      Affective Functioning: Congruent      Mood: euthymic      Level of consciousness:  Alert, Oriented x4, and Attentive      Response to Learning: Able to verbalize current knowledge/experience and Progressing to goal      Endings: None Reported    Modes of Intervention: Support, Socialization, Activity, and Movement      Discipline Responsible: /Counselor      Signature:  Jules Lozoya LCSW

## 2024-02-15 NOTE — GROUP NOTE
Group Therapy Note    Date: 2/15/2024    Group Start Time:  2:00 PM  Group End Time:  2:45 PM  Group Topic: Wrap-Up    [facility]    Jules Lozoya LCSW        Group Therapy Note    The patients were encouraged to complete the afternoon wrap up assessment to identify mood and any safety concerns. The patients were encouraged to participate in a love and kindness meditation as well as an affirmation exercise. The patients were encouraged to complete an afternoon agenda to prioritize self-care and practicing a coping strategy.     Attendees: 9       Patient's Goal:  Identify mood and plan for self-care       Notes: The patient completed the wrap up assessment and denied SI/HI. The patient participated in the love and kindness meditation and the affirmation exercise. The patient completed an afternoon agenda prioritizing self-care. The patient shared that her self-care for this evening will be going to the gym and getting enough sleep. The patient will continue to identify mood and plan for self-care in the Wrap up group.     Status After Intervention:  Unchanged    Participation Level: Active Listener and Interactive    Participation Quality: Appropriate, Attentive, Sharing, and Supportive      Speech:  normal      Thought Process/Content: Logical      Affective Functioning: Congruent      Mood: euthymic      Level of consciousness:  Alert      Response to Learning: Able to verbalize current knowledge/experience and Progressing to goal      Endings: None Reported    Modes of Intervention: Support, Socialization, and Activity      Discipline Responsible: /Counselor      Signature:  Jules Lozoya LCSW

## 2024-02-15 NOTE — GROUP NOTE
Group Therapy Note    Date: 2/15/2024    Group Start Time:  1:10 PM  Group End Time:  2:00 PM  Group Topic: Psychoeducation    [facility]    Katy Patel MSW        Group Therapy Note    Writer facilitated psychoeducation group on effective interpersonal communication. Writer provided handouts on the FAST skill for maintaining self respect. Writer opened with encouraging pts to share about and reflect on times when they have tried to stand up for themselves and their needs and experienced difficulty doing so in an effective way. Writer encouraged participation through prompting pts to read from the handout for peers, asking open ended questions, and asking socratic questions. Writer closed with education on the JESSICA skill.    Attendees: 9       Patient's Goal:   understanding and applying FAST and JESSICA skills.      Notes: Pt presented as attentive and engaged as evidenced by appropriate eye contact and answering questions. Pt presented with pressured speech. Pt identified a time in which she experienced difficulty standing up for her principles due to invalidation from others. Pt will continue to work on understanding and applying FAST and JESSICA skills.      Status After Intervention:  Unchanged    Participation Level: Active Listener and Interactive    Participation Quality: Appropriate, Attentive, and Sharing      Speech:  pressured      Thought Process/Content: Logical      Affective Functioning: Congruent      Mood: euthymic      Level of consciousness:  Alert, Oriented x4, and Attentive      Response to Learning: Able to verbalize current knowledge/experience and Progressing to goal      Endings: None Reported    Modes of Intervention: Education      Discipline Responsible: /Counselor      Signature:  DEBORA HUNT

## 2024-02-16 ENCOUNTER — HOSPITAL ENCOUNTER (OUTPATIENT)
Facility: HOSPITAL | Age: 29
Setting detail: RECURRING SERIES
Discharge: HOME | End: 2024-02-19
Payer: COMMERCIAL

## 2024-02-16 VITALS
DIASTOLIC BLOOD PRESSURE: 73 MMHG | HEART RATE: 77 BPM | OXYGEN SATURATION: 100 % | SYSTOLIC BLOOD PRESSURE: 100 MMHG | TEMPERATURE: 98.3 F

## 2024-02-16 PROCEDURE — 90853 GROUP PSYCHOTHERAPY: CPT

## 2024-02-16 NOTE — GROUP NOTE
Group Therapy Note    Date: 2/16/2024    Group Start Time: 10:40 AM  Group End Time: 11:30 AM  Group Topic: Cognitive Skills    [facility]    Jules Lozoya LCSW        Group Therapy Note    The patients were provided an activity to identify values and discuss values with peers.     Attendees: 8       Patient's Goal:  Discuss values and decision making    Notes:  The patient participated in values identification exercise and discussion. The patient shared thoughts about dari as a value and personal experiences of being judged by people in her [employer].  The patient will continue to participate in decision making exercise during the cognitive skills group.     Status After Intervention:  Unchanged    Participation Level: Active Listener and Interactive    Participation Quality: Appropriate, Attentive, Sharing, and Supportive      Speech:  normal      Thought Process/Content: Logical      Affective Functioning: Congruent      Mood: euthymic      Level of consciousness:  Alert, Oriented x4, and Attentive      Response to Learning: Able to verbalize current knowledge/experience, Capable of insight, and Progressing to goal      Endings: None Reported    Modes of Intervention: Support, Socialization, and Activity      Discipline Responsible: /Counselor      Signature:  Jules Lozoya LCSW

## 2024-02-16 NOTE — GROUP NOTE
Group Therapy Note    Date: 2/16/2024    Group Start Time:  1:10 PM  Group End Time:  2:00 PM  Group Topic: Psychoeducation    [facility]    Lulú Decker MSW        Group Therapy Note    Patients were lead through creation of a thought log. Patients were asked to provide an example to work through with the group. Patients were asked to identify thoughts, feelings, and reactions to the example. Patients were asked to challenge thoughts and reframe. Patients were asked to reflect and identify times where thought logs would be helpful.    Attendees: 7       Patient's Goal:  Practice creating a thought log, reframe and challenge thoughts    Notes:  The patient engaged in psychoeducation group. The patient identified thoughts and feelings to contribute to the group thought log. The patient identified ways to reframe thoughts. The patient did not share an example of how she might use a thought log. The patient will continue to practice challenging thought patterns.    Status After Intervention:  Unchanged    Participation Level: Active Listener and Interactive    Participation Quality: Appropriate, Attentive, and Sharing      Speech:  normal      Thought Process/Content: Logical  Linear      Affective Functioning: Congruent      Mood: euthymic      Level of consciousness:  Alert, Oriented x4, and Attentive      Response to Learning: Able to verbalize current knowledge/experience and Progressing to goal      Endings: None Reported    Modes of Intervention: Education and Activity      Discipline Responsible: /Counselor      Signature:  DEBORA Mack

## 2024-02-16 NOTE — GROUP NOTE
Group Therapy Note    Date: 2/16/2024    Group Start Time:  2:00 PM  Group End Time:  2:45 PM  Group Topic: Wrap-Up    [facility]    Katy Patel MSW        Group Therapy Note    Writer facilitated a community wrap up group focused on assessing for safety, safety plan review, and practicing creativity for coping. Writer facilitated a symptom and safety check in using the afternoon check in form. Writer reviewed safety plans and prompted pt to identify any changes needed to then be added to the safety plans in Epic. Writer facilitated creative expression for coping with vision boards to prompt pts to think about the future in a positive way or represent themselves in a positive way.    Attendees: 6       Patient's Goal: disclosing safety concerns and practicing coping through creativity.      Notes:  Pt denied SI/HI/ANITHA on the check in form. Pt was provided a physical copy of the pt’s safety plan for review. Pt provided encouraging feedback to a peer. Pt engaged appropriately in the creative activity and shared appropriately. Pt will continue to work on disclosing safety concerns and practicing coping through creativity.      Status After Intervention:  Unchanged    Participation Level: Active Listener and Interactive    Participation Quality: Appropriate, Attentive, and Sharing      Speech:  normal      Thought Process/Content: Logical      Affective Functioning: Congruent      Mood: euthymic      Level of consciousness:  Alert, Oriented x4, and Attentive      Response to Learning: Able to verbalize current knowledge/experience and Progressing to goal      Endings: None Reported    Modes of Intervention: Support and Activity      Discipline Responsible: /Counselor      Signature:  DEBORA HUNT

## 2024-02-16 NOTE — GROUP NOTE
Group Therapy Note    Date: 2/16/2024    Group Start Time:  9:00 AM  Group End Time:  9:40 AM  Group Topic: Community Meeting    [facility] Katy Cagle MSW        Group Therapy Note    Writer facilitated the morning check in community group focused on evaluating presentation, assessing for safety, introductions with a new peer, and processing recent events. Writer facilitated a symptom and safety check in using the morning check in form. Writer prompted pts to introduce themselves, to include pronouns, length of time in PHP, and a silly fact about themselves. Writer encouraged patients to share events in their lives, identify triggers for difficult emotions, and identify ways in which the patients perceive themselves to be making progress. Writer prompted and supported peer feedback.     Attendees: 7       Patient's Goal:  disclosing safety concerns and engaging with peers in discussion and feedback.     Notes:  Pt denied SI/HI/ANITHA on the check in form. Pt engaged appropriately in peer introductions. Pt presented with pressured speech. Pt did not engage with peers for remainder of group. Pt will continue to work on disclosing safety concerns and engaging with peers in discussion and feedback.     Status After Intervention:  Unchanged    Participation Level: Minimal    Participation Quality: Appropriate and Attentive      Speech:  pressured      Thought Process/Content: Logical      Affective Functioning: Congruent      Mood: anxious      Level of consciousness:  Alert, Oriented x4, and Attentive      Response to Learning: Able to verbalize current knowledge/experience and Progressing to goal      Endings: None Reported    Modes of Intervention: Support and Socialization      Discipline Responsible: /Counselor      Signature:  DEBORA HUNT

## 2024-02-16 NOTE — GROUP NOTE
Group Therapy Note    Date: 2/16/2024    Group Start Time: 12:00 PM  Group End Time:  1:00 PM  Group Topic: Relapse Prevention    [facility] Heather Lorenzo        Group Therapy Note  This writer facilitated a treatment planning group by providing education around the ABCs of substance use. After this, the writer showed patients examples of how to use the ABC log. The patients were asked to identify their triggers that caused substance use or unhealthy coping skills. After this, the patients were asked to identify healthy coping skills that they could begin utilizing to replace the unhealthy skills.     Attendees: 7       Patient's Goal:  To learn about the ABCs of substance use and identify triggers and healthy coping skills.    Notes:  The patient presented well to the session. This writer prompted the patient to reflect on a situation where unhealthy coping skills were used in order to identify a trigger. The patient reflected on the coping skill of over eating. This writer showed the patient how to apply the situation to the ABC log. The patient will continue to work on identifying triggers in further groups.     Status After Intervention:  Unchanged    Participation Level: Active Listener and Interactive    Participation Quality: Appropriate, Attentive, and Sharing      Speech:  normal      Thought Process/Content: Logical  Linear      Affective Functioning: Congruent      Mood: euthymic      Level of consciousness:  Alert, Oriented x4, and Attentive      Response to Learning: Able to verbalize current knowledge/experience, Able to verbalize/acknowledge new learning, Able to retain information, and Capable of insight      Endings: None Reported    Modes of Intervention: Education, Support, Socialization, Exploration, and Clarifying      Discipline Responsible: /Counselor      Signature:  DEBORA Adams  Instructions: This plan will send the code FBSE to the PM system.  DO NOT or CHANGE the price. Price (Do Not Change): 0.00 Detail Level: Simple

## 2024-02-16 NOTE — GROUP NOTE
Group Therapy Note    Date: 2/16/2024    Group Start Time:  9:40 AM  Group End Time: 10:30 AM  Group Topic: Process Group - Outpatient    [facility]    Heather Landrum        Group Therapy Note  This writer facilitated a process group by encouraging the patients to share about any recent thoughts, emotions, feelings, or situations, that they have experienced, in order to gain support and feedback from their peers. During this conversation the topic of boundaries and grief were discussed. This writer used open ended questions to encourage patients to reflect on their feelings and prompt discussion.    Attendees: 6       Patient's Goal:  To process thoughts and feelings and provide support and feedback to peers.    Notes:  The patient presented well to the session. She shared that she received news that someone she knew had passed away. She then shared that she did not know how to process the news. This writer initiated a conversation on grief and discussed reactions and emotions that people may go through. The patient reflected on her thoughts regarding death. Her peers provided feedback which she appeared receptive to. The patient will continue to work on navigating grief in further groups.     Status After Intervention:  Unchanged    Participation Level: Active Listener and Interactive    Participation Quality: Appropriate, Attentive, and Sharing      Speech:  normal      Thought Process/Content: Logical  Linear      Affective Functioning: Congruent      Mood: depressed      Level of consciousness:  Alert, Oriented x4, and Attentive      Response to Learning: Able to verbalize current knowledge/experience and Capable of insight      Endings: None Reported    Modes of Intervention: Support, Socialization, Exploration, and Clarifying      Discipline Responsible: /Counselor      Signature:  DEBORA Adams

## 2024-02-17 NOTE — BH NOTE
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    Criteria for Continued Treatment (check all that apply):   [x] Preventing Decompensation   [x] Improving Level of Functioning  [] Reducing Isolative Behaviors  [] Understanding Diagnosis and Need for Medications  [] Improving Treatment/Medication Compliance  [] Confronting Denial of Illness  [] Stabilizing Level of Functioning  [] Improving Emotional/Social/Cognitive Functioning  [] Decreasing Frequency of Hospitalizations    Suicidal/Homicidal ideations:   [x] Absent  [] Present  [] Passive  [] Intent  [] Plan  [] Death Wishes      CURRENT CONDITION OF PATIENT & PROGRESS ON TREATMENT PLAN    Subjective (ongoing complaints by patient regarding symptom severity, presentation, affect, function, etc.):  Yen Edgar reports feeling alright and having a fair week. Checking on her medications but does not seem to have any concerns with them.  Appropriately interactive and aware. Tolerating medications well.  Appetite is good.  Eating and sleeping fairly.  Likes the groups so far.       Behavior (side effects, changes in mental status, objective observations seen in individual sessions or by staff): Yen Edgar is calm cooperative, clear and coherent with speech of average rate volume and tone.  Moods are fair.  Affect is fair range. Appropriately dressed and groomed.  Denies SI/HI/AH/VH.  No aggression or violence.  Appropriately interactive and aware.  Insight is fair and judgement is fair.        Assessment/Plan (functional improvement and/or ongoing impairment, response to treatment, plan for future ongoing treatment and medication management to include revisions): Continue current care  Groups, milieu and individual therapy  Medication modification as appropriate  Disposition planning with social work          [x] NO NEW Medications at this time.   [] New Medication prescribed

## 2024-02-19 ENCOUNTER — HOSPITAL ENCOUNTER (OUTPATIENT)
Facility: HOSPITAL | Age: 29
Setting detail: RECURRING SERIES
Discharge: HOME | End: 2024-02-22
Payer: COMMERCIAL

## 2024-02-19 VITALS
DIASTOLIC BLOOD PRESSURE: 83 MMHG | TEMPERATURE: 97.7 F | HEART RATE: 89 BPM | SYSTOLIC BLOOD PRESSURE: 114 MMHG | OXYGEN SATURATION: 100 %

## 2024-02-19 PROCEDURE — 90832 PSYTX W PT 30 MINUTES: CPT

## 2024-02-19 PROCEDURE — 90853 GROUP PSYCHOTHERAPY: CPT

## 2024-02-19 NOTE — GROUP NOTE
Group Therapy Note    Date: 2/19/2024    Group Start Time:  9:40 AM  Group End Time: 10:30 AM  Group Topic: Process Group - Outpatient    [facility]    Heather Landrum        Group Therapy Note  This writer facilitated a process group by encouraging the patients to share about any recent thoughts, emotions, feelings, or situations, that they have experienced, in order to gain support and feedback from their peers. During this conversation the topic of grief, labeling, and empathy were discussed. This writer used open ended questions to encourage patients to reflect on their feelings and to prompt discussion.     Attendees: 4       Patient's Goal:  To process thoughts and feelings and provide support and feedback to peers.    Notes:  The patient presented well to the session. The patient discussed challenges with school and mentally overall. The patient reflected on her long term goals and the meaningfulness of completing school as a step. The patient also shared that she was afraid of failure. This writer discussed how goals can be helpful and help set intentions, as well at the thought process of perfectionism. The patient also provided feedback to her peers regarding familial relationships. The patient will continue to work on processing emotions and situations in further groups.     Status After Intervention:  Unchanged    Participation Level: Active Listener and Interactive    Participation Quality: Appropriate, Attentive, Sharing, and Supportive      Speech:  normal      Thought Process/Content: Logical  Linear      Affective Functioning: Congruent      Mood: euthymic      Level of consciousness:  Alert, Oriented x4, and Attentive      Response to Learning: Able to verbalize current knowledge/experience, Able to retain information, and Capable of insight      Endings: None Reported    Modes of Intervention: Support, Socialization,

## 2024-02-19 NOTE — GROUP NOTE
Group Therapy Note    Date: 2/19/2024    Group Start Time:  1:10 PM  Group End Time:  2:00 PM  Group Topic: Psychoeducation    [facility] Heather Lorenzo        Group Therapy Note  This writer facilitated a psychoeducational group that educated patients on the outer and inner mask of a person. This writer provided patients with an outer and inner mask and encouraged patients to decorate the mask with pictures, words, and symbols regarding how they appear to others versus the parts they keep hidden for people they trust. After allowing time for this, this writer used open ended questions to prompt discussion on the differences and similarities between their masks.     Attendees: 8       Patient's Goal:  To engage in self reflection and perception regarding outer and inner masks    Notes:  The patient presented well to the session. The patient was observed to work on her mask by coloring her inner and outer mask. The patient was observed to be quiet as her peers engaged in discussion. This writer prompted the patient to reflect on any difference or similarities between her outer and inner mask. The patient smiled and shared that her outer mask is more shiny and sparkly than the inner mask. The patient will continue to work on self reflection in further groups.     Status After Intervention:  Unchanged    Participation Level: Active Listener and Interactive    Participation Quality: Appropriate, Attentive, and Sharing      Speech:  normal      Thought Process/Content: Logical  Linear      Affective Functioning: Congruent      Mood: euthymic      Level of consciousness:  Alert, Oriented x4, and Attentive      Response to Learning: Able to verbalize current knowledge/experience, Able to verbalize/acknowledge new learning, Able to retain information, and Capable of insight      Endings: None Reported    Modes of Intervention: Education,

## 2024-02-19 NOTE — BH NOTE
Writer called Dr. Clarke at 10:16 regarding pt elevated temperature. Dr. Clarke recommended to offer the option of pt leaving if she is feeling symptoms and to follow up with an urgent care clinic.  recommended having pt wear a mask during treatment day today.  Recommended retaking temperature to verify result.    Writer retook temperature at 10:33, pt reported no symptoms and temp was 97.7F.    DEBORA Mack

## 2024-02-19 NOTE — GROUP NOTE
Group Therapy Note    Date: 2/19/2024    Group Start Time: 12:00 PM  Group End Time:  1:00 PM  Group Topic: Psychoeducation    [facility]    Jules Lozoya LCSW        Group Therapy Note    The patients were encouraged to participate in an ice breaker exercise as the group were combined due to census. The patients were encouraged to complete an exercise on reflection how they present when not feeling well or dysregulated and how others can help them when they are in that space.     Attendees: 10       Patient's Goal: Reflect and discuss how they receive help       Notes: The patient participated in the ice breaker exercise. The patient shared reflection on presentation when dysregulated and how her support can best help her in those moments. The patient identify her family as her support and being taken out of the house as helpful when dysregulated. The patient will continue to reflect in the Psychoeducational group.     Status After Intervention:  Unchanged    Participation Level: Active Listener and Interactive    Participation Quality: Appropriate, Attentive, Sharing, and Supportive      Speech:  normal      Thought Process/Content: Logical      Affective Functioning: Congruent      Mood: euthymic      Level of consciousness:  Alert, Oriented x4, and Attentive      Response to Learning: Able to verbalize current knowledge/experience, Capable of insight, and Progressing to goal      Endings: None Reported    Modes of Intervention: Support, Socialization, and Activity      Discipline Responsible: /Counselor      Signature:  Jules Lozoya LCSW

## 2024-02-19 NOTE — BH NOTE
Partial Hospitalization Program Individual Psychotherapy Note      Diagnosis: Disorganized schizophrenia    Goal: Individual session to discuss progress towards goals, update treatment plan, and discuss discharge.         Psychotherapy Session    Start time: 1050  Stop time: 1120        Patient Mental Status and Mood/Affect:flight of ideas     Patient Behavior and Appearance: Cooperativeshows no evidence of impairment    Intervention/Techniques: Validated/Supported and Other: treatment planning, and discharge planning.     Focus of Session/Patient Response and Progress Towards Goal: discuss progress towards treatment goals, discharge planning, and discussion of experience in group and recent events.     Narrative: The patient presented to the meeting as scheduled and denied SI/HI and was oriented to person, place, time, and situation. The patient presented was flight of ideas, but was able to participate in treatment planning, and discharge planning. The patient discussed rescheduling a job interview that was intended to take place tomorrow and that she is currently in school and receiving help from her grandmother. The patient shares a room with her mother and lives in a home with her brother and mother. The patient reported receiving support from her grandmother. The patient stated that she was pregnant last year and potentially had a miscarriage. The patient has an appointment with an OB on 3/14. The patient stated that she was told something was in her body and that the doctor noticed, but the patient seemed unsure of what she was told or what occurred to her at this time.     The patient participated in treatment and discharge planning. The patient signed their treatment plan.     The patient is appropriate for continued treatment in [facility] until anticipated discharge date of 3/8    Jules Lozoya LCSW

## 2024-02-19 NOTE — GROUP NOTE
Group Therapy Note    Date: 2/19/2024    Group Start Time: 10:40 AM  Group End Time: 11:30 AM  Group Topic: Cognitive Skills    [facility]    Magdalena Gastelum MSW        Group Therapy Note    Attendees: 4    Writer facilitated cognitive skills group centered around the cognitive model and cognitive distortions. Writer provided patients with information sheet on both of those topics, and opened group by providing education about the cognitive model and interactions between thoughts, emotions, and behavior. Writer then introduced cognitive distortions and asked group members to take turns reading. Writer then facilitated sharing about experiences with cognitive distortions.        Patient's Goal:  Participate in group therapy, increase understanding of the cognitive model, identify cognitive distortions.     Notes:  Patient presented for session alert/oriented x4 and friendly, introduced self, but was pulled for individual during this group. Patient will continue with identified treatment goals in further groups.     Status After Intervention:  Unchanged    Participation Level: Minimal    Participation Quality: Appropriate and Attentive      Speech:  normal      Thought Process/Content: Logical      Affective Functioning: Congruent      Mood: euthymic      Level of consciousness:  Alert and Oriented x4      Response to Learning: Able to retain information and Capable of insight      Endings: None Reported    Modes of Intervention: Education, Exploration, and Clarifying      Discipline Responsible: /Counselor      Signature:  DEBORA Harper

## 2024-02-19 NOTE — GROUP NOTE
Group Therapy Note    Date: 2/19/2024    Group Start Time:  9:00 AM  Group End Time:  9:40 AM  Group Topic: Community Meeting    [facility]    Heather Landrum        Group Therapy Note  This writer facilitated a community group that encouraged patients to check in with how their weekend went and how they were feeling today. Patients were asked to complete the check-in form. After allowing time for this, this writer provided patients with a sentence completion activity. Patients were encouraged to reflect on memories, friends, and goals. This writer used open ended questions and reflective statements to prompt discussion.      Attendees: 4       Patient's Goal:  To complete check-in form and engage in discussion regarding self-reflection.    Notes:  The patient presented well to the session. The patient completed the check in form and did not identify any SI or self harm. The patient shared that she was doing pretty good today. The patient then reflected on things she was struggling with and setting goals for the day. This writer used open ended questions to encourage the patient to identify options to problem solve. The patient will continue to work on self-reflection in further groups.     Status After Intervention:  Unchanged    Participation Level: Active Listener and Interactive    Participation Quality: Appropriate, Attentive, and Sharing      Speech:  normal      Thought Process/Content: Logical  Linear      Affective Functioning: Congruent      Mood: euthymic      Level of consciousness:  Alert, Oriented x4, and Attentive      Response to Learning: Able to verbalize current knowledge/experience, Able to retain information, and Capable of insight      Endings: None Reported    Modes of Intervention: Support, Socialization, Exploration, Clarifying, and Problem-solving      Discipline Responsible: Social

## 2024-02-19 NOTE — GROUP NOTE
Group Therapy Note    Date: 2/19/2024    Group Start Time:  2:00 PM  Group End Time:  2:45 PM  Group Topic: Wrap-Up    [facility]    Magdalena Gastelum MSW        Group Therapy Note    Attendees: 10    Writer facilitated wrap up group this afternoon. Writer opened by providing patients with their wrap up sheets and encouraging them to fill them out. Writer then provided patients with the \"Self Care Menu\" activity and encouraged them to work their way through it and then share.        Patient's Goal:  Participate in group therapy, complete wrap up sheet, identify different ways to participate in self care.     Notes:  Patient engaged well in group this afternoon. She filled out her wrap up sheet, declining any SI or HI. She shared that she likes to do her eyebrows, nails, and hair as \"starters\" to begin her day on a good foot. She shared that it can be tough for folks who are struggling with finances to partake in these things but that there are affordable options out there. Patient engaged fairly with peers throughout and will continue with identified treatment goals in further groups.     Status After Intervention:  Unchanged    Participation Level: Active Listener and Interactive    Participation Quality: Appropriate and Attentive      Speech:  normal      Thought Process/Content: Logical      Affective Functioning: Congruent      Mood: euthymic      Level of consciousness:  Alert and Oriented x4      Response to Learning: Able to verbalize current knowledge/experience, Capable of insight, and Progressing to goal      Endings: None Reported    Modes of Intervention: Support, Socialization, and Exploration      Discipline Responsible: /Counselor      Signature:  DEBORA Harper

## 2024-02-20 ENCOUNTER — HOSPITAL ENCOUNTER (OUTPATIENT)
Facility: HOSPITAL | Age: 29
Setting detail: RECURRING SERIES
Discharge: HOME | End: 2024-02-23
Payer: COMMERCIAL

## 2024-02-20 VITALS
TEMPERATURE: 98.8 F | HEART RATE: 75 BPM | DIASTOLIC BLOOD PRESSURE: 71 MMHG | OXYGEN SATURATION: 100 % | SYSTOLIC BLOOD PRESSURE: 102 MMHG

## 2024-02-20 PROCEDURE — 90853 GROUP PSYCHOTHERAPY: CPT

## 2024-02-20 NOTE — BH NOTE
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    Criteria for Continued Treatment (check all that apply):   [x] Preventing Decompensation   [x] Improving Level of Functioning  [] Reducing Isolative Behaviors  [] Understanding Diagnosis and Need for Medications  [] Improving Treatment/Medication Compliance  [] Confronting Denial of Illness  [] Stabilizing Level of Functioning  [] Improving Emotional/Social/Cognitive Functioning  [] Decreasing Frequency of Hospitalizations    Suicidal/Homicidal ideations:   [x] Absent  [] Present  [] Passive  [] Intent  [] Plan  [] Death Wishes      CURRENT CONDITION OF PATIENT & PROGRESS ON TREATMENT PLAN    Subjective (ongoing complaints by patient regarding symptom severity, presentation, affect, function, etc.):  Yen Edgar reports feeling alright and having a good weekend. Checking on her medications but does not seem to have any concerns with them.  Appropriately interactive and aware. Tolerating medications well.  Appetite is good.  Eating and sleeping fairly.  Feeling  more comfortable in group setting.       Behavior (side effects, changes in mental status, objective observations seen in individual sessions or by staff): Yen Edgar is calm cooperative, clear and coherent with speech of average rate volume and tone.  Moods are fair.  Affect is fair range. Appropriately dressed and groomed.  Denies SI/HI/AH/VH.  No aggression or violence.  Appropriately interactive and aware.  Insight is fair and judgement is fair.        Assessment/Plan (functional improvement and/or ongoing impairment, response to treatment, plan for future ongoing treatment and medication management to include revisions): Continue current care  Groups, milieu and individual therapy  Medication modification as appropriate  Disposition planning with social work          [x] NO NEW Medications at this time.   [] New

## 2024-02-20 NOTE — GROUP NOTE
Group Therapy Note    Date: 2/20/2024    Group Start Time:  1:10 PM  Group End Time:  2:00 PM  Group Topic: Psychoeducation    [facility]    Magdalena Gastelum MSW        Group Therapy Note    Attendees: 6    Writer facilitated psychoeducation group centered around art and mindfulness. Writer opened by showing patients a video on how to create mandalas, handed out supplies, and had them create their own mandalas. Writer provided education about mindfulness and art, and asked patients to share their creations and how they felt to make.        Patient's Goal:  Participate in group therapy, build understanding of mindfulness and engage in creative activity.     Notes:  Patient engaged fairly in group this afternoon. She presented as attentive to the how-to video, however when sharing what she created patient had drawn several flowers with stems rather than a mandala. Patient identified that it was relaxing to have time to engage in a creative endeavor however. Patient will continue with identified treatment goals in further groups.     Status After Intervention:  Unchanged    Participation Level: Active Listener and Interactive    Participation Quality: Appropriate and Attentive      Speech:  normal      Thought Process/Content: Logical      Affective Functioning: Congruent      Mood: euthymic      Level of consciousness:  Alert and Oriented x4      Response to Learning: Able to verbalize current knowledge/experience, Capable of insight, and Progressing to goal      Endings: None Reported    Modes of Intervention: Education, Exploration, and Activity      Discipline Responsible: /Counselor      Signature:  DEBORA Harper    
                                                                      Group Therapy Note    Date: 2/20/2024    Group Start Time:  9:00 AM  Group End Time:  9:40 AM  Group Topic: Community Meeting    [facility]    Magdalena Gastelum MSW        Group Therapy Note    Attendees: 6    Writer facilitated community check in group this morning, opening by providing patients their check in sheets and encouraging them to complete them. Writer then asked if anyone wanted to share anything with the group--no one did, so writer moved into coping skills alphabet activity, encouraging patients to first work quietly and then to share their answers so that the alphabet could be shared on the board.        Patient's Goal:  Participate in group therapy, complete check in sheet, identify coping skills.     Notes:  Patient engaged well in group this morning. She filled out her check in sheet, declining any SI or thoughts of self harm. She identified low levels of anger and depression but high levels of anxiety. Patient participated well in the coping skills activity, identifying several for different letters of the alphabet including \"hair\". Patient will continue with identified treatment goals in further groups.     Status After Intervention:  Unchanged    Participation Level: Active Listener and Interactive    Participation Quality: Appropriate and Attentive      Speech:  normal      Thought Process/Content: Logical      Affective Functioning: Congruent      Mood: euthymic      Level of consciousness:  Alert and Oriented x4      Response to Learning: Able to verbalize current knowledge/experience, Capable of insight, and Progressing to goal      Endings: None Reported    Modes of Intervention: Support, Socialization, and Exploration      Discipline Responsible: /Counselor      Signature:  DEBORA Harper    
                                                                      Group Therapy Note    Date: 2/20/2024    Group Start Time:  9:40 AM  Group End Time: 10:30 AM  Group Topic: Process Group - Outpatient    [facility]    Heather Landrum        Group Therapy Note  This writer facilitated a process group by encouraging the patients to share about any recent thoughts, emotions, feelings, or situations, that they have experienced, in order to gain support and feedback from their peers. During this conversation the topic of dreams and seasons were discussed. This writer used open ended questions to encourage patients to reflect on their feelings.    Attendees: 6       Patient's Goal:  To process thoughts and feelings and provide support and feedback to peers.    Notes:  The patient presented well to the session. This writer prompted the patient to reflect on how she was feeling today. The patient shared that was feeling good. This writer then prompted the patient to reflect on how the different seasons affected mood. The patient shared that she was looking forward to warmer weather due to being able to change up her wardrobe which boosts self esteem. The patient will continue to work on reflecting on emotions and processing experiences in further groups.    Status After Intervention:  Unchanged    Participation Level: Active Listener and Interactive    Participation Quality: Appropriate, Attentive, and Sharing      Speech:  normal      Thought Process/Content: Logical  Linear      Affective Functioning: Congruent      Mood: euthymic      Level of consciousness:  Alert, Oriented x4, and Attentive      Response to Learning: Able to verbalize current knowledge/experience, Able to retain information, and Capable of insight      Endings: None Reported    Modes of Intervention: Support, Socialization, Exploration, and Clarifying      Discipline Responsible: /Counselor      Signature:  Heather Newby 
                                                                      Group Therapy Note    Date: 2/20/2024    Group Start Time: 10:40 AM  Group End Time: 11:30 AM  Group Topic: Cognitive Skills    [facility]    Magdalena Gastelum MSW        Group Therapy Note    Attendees: 7    Writer facilitated cognitive skills group centered around ACT skill of psychological flexibility. Writer provided patients information sheet on the concept and talked through it, then asked patients to complete the activity, where they ranked themselves on a scale of 1-10 for the 6 components (being present, values, commitment, self as context, defusion, and acceptance). Writer then asked patients which of these they felt they needed to improve in and brainstormed some ideas for ways to increase each component that writer put on the board.        Patient's Goal:  Engage in group therapy, increase understanding of psychological flexibility, identify ways to increase psychological flexibility broken down by component.     Notes:  Patient engaged fairly in group this morning. She presented overall quiet but attentive as evidenced by appropriate eye contact and asking questions about the activity appropriately. Patient will continue with identified treatment goals in further groups.     Status After Intervention:  Unchanged    Participation Level: Active Listener and Interactive    Participation Quality: Appropriate and Attentive      Speech:  normal      Thought Process/Content: Logical      Affective Functioning: Congruent      Mood: euthymic      Level of consciousness:  Alert and Oriented x4      Response to Learning: Able to verbalize current knowledge/experience, Capable of insight, and Progressing to goal      Endings: None Reported    Modes of Intervention: Education, Exploration, and Clarifying      Discipline Responsible: /Counselor      Signature:  DEBORA Harper    
Heather Landrum, MSW Student     
Worker/Counselor      Signature:  Heather Landrum, MSW Student

## 2024-02-21 ENCOUNTER — HOSPITAL ENCOUNTER (OUTPATIENT)
Facility: HOSPITAL | Age: 29
Setting detail: RECURRING SERIES
Discharge: HOME | End: 2024-02-24
Payer: COMMERCIAL

## 2024-02-21 VITALS
SYSTOLIC BLOOD PRESSURE: 108 MMHG | OXYGEN SATURATION: 97 % | TEMPERATURE: 98.2 F | HEART RATE: 83 BPM | DIASTOLIC BLOOD PRESSURE: 85 MMHG

## 2024-02-21 PROCEDURE — 90853 GROUP PSYCHOTHERAPY: CPT

## 2024-02-21 NOTE — GROUP NOTE
Group Therapy Note    Date: 2/21/2024    Group Start Time:  2:00 PM  Group End Time:  2:45 PM  Group Topic: Wrap-Up    [facility]    Magdalena Gastelum MSW        Group Therapy Note    Attendees: 5    Writer facilitated wrap up group this afternoon. Writer opened by encouraging patients to fill out their wrap up sheets. Writer then provided patients with an informational sheet on the RAIN technique for mindfulness and played a guided meditation to end the day.        Patient's Goal:  Participate in group therapy, increase understanding of the way trauma affects the brain and the reactions that they might have as a result.     Notes:  Patient engaged fairly in group this afternoon. She filled out her wrap up sheet, declining any SI or HI. She presented as engaged in the meditation for the most part. Toward the end of group, writer asked patients to reflect on a goal that they'd like to work on, and patient shared she'd like to work on her patience. She feels her attention span is short and she can easily get distracted, which feels frustrating for her. Writer provided encouragement and validation for this, and patient presented as receptive. Patient will continue with identified treatment goals in further groups.     Status After Intervention:  Unchanged    Participation Level: Active Listener and Interactive    Participation Quality: Appropriate and Attentive      Speech:  normal      Thought Process/Content: Logical      Affective Functioning: Congruent      Mood: euthymic      Level of consciousness:  Alert and Oriented x4      Response to Learning: Able to verbalize current knowledge/experience, Capable of insight, and Progressing to goal      Endings: None Reported    Modes of Intervention: Exploration, Clarifying, and Activity      Discipline Responsible: /Counselor      Signature:  DEBORA Harper

## 2024-02-21 NOTE — GROUP NOTE
Group Therapy Note    Date: 2/21/2024    Group Start Time:  9:00 AM  Group End Time:  9:40 AM  Group Topic: Community Meeting    [facility]    Magdalena Gastelum MSW        Group Therapy Note    Attendees: 4    Writer facilitated community check in group this morning, opening by encouraging patients to fill out their check in sheets. Writer checked in with patients to see how they were feeling, asking if they were looking forward to spring coming. Writer then provided them the \"5 Things\" activity, and asked them to identify 5 things they love, 5 things they are looking forward to, and 5 things they'd like to change.        Patient's Goal:  Participate in group therapy, complete check in sheet, engage in warm up activity.     Notes:  Patient engaged fairly in group this morning. She completed her check in sheet, declining any SI or thoughts of self harm. Patient initially presented as preoccupied, but interacted when prompted and did participated well in the activity. She identified that she loves adventure and travel, and is looking forward to achieving her goals, especially getting a new car and a new home. She would like to change her living situation and feel more comfortable/safe in her space. Patient will continue with identified treatment goals in further groups.     Status After Intervention:  Unchanged    Participation Level: Active Listener and Interactive    Participation Quality: Appropriate and Attentive      Speech:  normal      Thought Process/Content: Logical      Affective Functioning: Congruent      Mood: euthymic      Level of consciousness:  Alert and Oriented x4      Response to Learning: Able to verbalize current knowledge/experience, Capable of insight, and Progressing to goal      Endings: None Reported    Modes of Intervention: Support, Socialization, and Exploration      Discipline Responsible: Social

## 2024-02-21 NOTE — GROUP NOTE
Group Therapy Note    Date: 2/21/2024    Group Start Time:  1:10 PM  Group End Time:  2:00 PM  Group Topic: Psychoeducation    [facility]    Lulú Decker MSW        Group Therapy Note    Patients were given a sheet to track the use of coping skills over the course of a week. Patients were asked to identify coping skills they want to use more, how they know when to use coping skills, and barriers to using coping skills. Patients were encouraged to offer feedback and suggestion to peers. Patients were lead through a 5 senses grounding technique to practice as a group and were educated on how grounding can be used as a coping skill.    Attendees: 5       Patient's Goal:  Identify coping skills, discuss how coping skills work, identify barriers, practice grounding    Notes:  The patient engaged in psychoeducation group. The patient identified using wrestling/boxing training as a coping skill for anger. The patient discussed how managing responses to trauma can be important in maintaining relationships and accomplishing goals. The patient engaged in the grounding skill and contributed when prompted. The patient will continue to practice reflecting and using coping skills.    Status After Intervention:  Unchanged    Participation Level: Active Listener and Interactive    Participation Quality: Appropriate, Attentive, and Sharing      Speech:  normal      Thought Process/Content: Logical  Linear      Affective Functioning: Blunted      Mood: euthymic      Level of consciousness:  Alert, Oriented x4, and Attentive      Response to Learning: Able to verbalize current knowledge/experience, Able to verbalize/acknowledge new learning, and Progressing to goal      Endings: None Reported    Modes of Intervention: Education and Activity      Discipline Responsible: /Counselor      Signature:  DEBORA Mack

## 2024-02-21 NOTE — GROUP NOTE
Group Therapy Note    Date: 2/21/2024    Group Start Time: 12:00 PM  Group End Time:  1:00 PM  Group Topic: Psychotherapy    [facility]    Magdalena Gastelum MSW        Group Therapy Note    Attendees: 5    Writer facilitated psychotherapy group centered around trauma. Writer played a Bandar Talk for patients that discussed how trauma affects the brain and what the protective factors are for these long term effects. Writer then tied this in to the types of trauma reactions sheet, which discusses fight, flight, freeze, and rosa as reaction types. Writer then facilitated resulting discussion.        Patient's Goal:  Participate in group therapy, increase understanding of the way trauma affects the brain and the reactions that they might have as a result.     Notes:  Patient engaged fairly in group this afternoon. She presented as drowsy for most of group but interacted well with peers towards the end. Patient will continue with identified treatment goals in further groups.     Status After Intervention:  Unchanged    Participation Level: Active Listener    Participation Quality: Appropriate      Speech:  normal      Thought Process/Content: Logical      Affective Functioning: Congruent      Mood: euthymic      Level of consciousness:  Alert and Oriented x4      Response to Learning: Able to verbalize current knowledge/experience, Capable of insight, and Progressing to goal      Endings: None Reported    Modes of Intervention: Education, Exploration, and Media      Discipline Responsible: /Counselor      Signature:  DEBORA Harper

## 2024-02-21 NOTE — GROUP NOTE
Group Therapy Note    Date: 2/21/2024    Group Start Time: 10:40 AM  Group End Time: 11:30 AM  Group Topic: Cognitive Skills    [facility]    Lulú Decker MSW        Group Therapy Note    Patients were given a sheet describing the differences between an inner critic voice and an inner  voice. Patients were asked to discuss characteristics of an inner critic and inner . Patients were asked reflective questions related to how they experience self-criticism. Patients were asked to identify examples of ways to strengthen their inner .    Attendees: 4       Patient's Goal:  Discuss characteristics of inner critic, identify examples of inner , reflect on experiences, identify coping skills    Notes:  The patient engaged minimally in cognitive skills group. The patient engaged verbally only when prompted. The patient described using self-care to demonstrate self-worth to herself. The patient was redirected from individual activities during group. The patient appeared to giggle at inappropriate times during group and declined to elaborate on why when asked. The patient will continue to practice engaging in groups and practicing skills.    Status After Intervention:  Unchanged    Participation Level: Minimal    Participation Quality: Attentive      Speech:  normal      Thought Process/Content: Logical  Linear      Affective Functioning: Flat, bizarre      Mood: euthymic      Level of consciousness:  Alert, Oriented x4, and Attentive      Response to Learning: Able to verbalize current knowledge/experience and Progressing to goal      Endings: None Reported    Modes of Intervention: Education and Exploration      Discipline Responsible: /Counselor      Signature:  DEBORA Mack

## 2024-02-21 NOTE — GROUP NOTE
Group Therapy Note    Date: 2/21/2024    Group Start Time:  9:40 AM  Group End Time: 10:30 AM  Group Topic: Process Group - Outpatient    [facility]    Magdalena Gastelum, DEBORA        Group Therapy Note    Attendees: 5    Writer facilitated process group this morning. Writer encouraged patients to continue sharing as needed from the activity in the first group, and facilitated discussion about how the activity had made them feel. Writer then asked patients to complete a \"Wisest Self\" reflection activity which asked them to envision their wisest self and reflect on their life from that mindset. Writer facilitated some sharing of this activity, but was not able to get through all the questions before the end of group.        Patient's Goal:  Participate in group therapy, engage in reflection and open processing with peers, envision their wisest self for reflection.     Notes:  Patient engaged fairly in group this morning. She shared about her experience of having felt \"off balance\" and unsafe centered around her symptoms and hospital admission. She expressed starting to feel better now, and shared that the activity was helpful in identifying things to look forward to and ways she can achieve her goals. Patient will continue with identified treatment goals in further groups.     Status After Intervention:  Unchanged    Participation Level: Active Listener and Interactive    Participation Quality: Appropriate and Attentive      Speech:  normal      Thought Process/Content: Logical      Affective Functioning: Congruent      Mood: euthymic      Level of consciousness:  Alert and Oriented x4      Response to Learning: Able to verbalize current knowledge/experience, Capable of insight, and Progressing to goal      Endings: None Reported    Modes of Intervention: Support, Socialization, and Exploration      Discipline Responsible: Social

## 2024-02-22 ENCOUNTER — HOSPITAL ENCOUNTER (OUTPATIENT)
Facility: HOSPITAL | Age: 29
Setting detail: RECURRING SERIES
Discharge: HOME | End: 2024-02-25
Payer: COMMERCIAL

## 2024-02-22 VITALS
DIASTOLIC BLOOD PRESSURE: 71 MMHG | HEART RATE: 75 BPM | SYSTOLIC BLOOD PRESSURE: 99 MMHG | OXYGEN SATURATION: 100 % | TEMPERATURE: 97.7 F

## 2024-02-22 PROCEDURE — 90853 GROUP PSYCHOTHERAPY: CPT

## 2024-02-22 NOTE — GROUP NOTE
Group Therapy Note    Date: 2/22/2024    Group Start Time:  1:14 PM  Group End Time:  2:00 PM  Group Topic: Psychoeducation    [facility]    Katy Patel MSW        Group Therapy Note    Writer facilitated psychoeducation group. Writer prompted pts to journal about what forgiveness means to them and their experiences with forgiveness. Writer prompted pts to share their responses. Writer prompted and supported peer support.    Attendees: 9       Patient's Goal:   understanding healthy forgiveness.       Notes: Pt presented with resistant behaviors. Pt was observed to engage in the journaling. Pt journaled at a table separate from her peers. When prompted to join the [place] for sharing, pt stated she did not want to because she is warmer in the sun from the window by the table. When prompted again and given the reason of it supporting sharing and peers being able to hear her, pt stated she did not want to share. Pt complied when prompted a 3rd time with statement from writer that it was an expectation that pt demonstrated being truly present with the group. Pt was observed to get on her phone, which then played music, while a peer shared. Pt had to be prompted to put her phone away. Pt will continue to work on understanding healthy forgiveness.       Status After Intervention:  Unchanged    Participation Level: Minimal    Participation Quality: Resistant      Speech:  normal      Thought Process/Content: Logical      Affective Functioning: Congruent      Mood: euthymic      Level of consciousness:  Alert and Oriented x4      Response to Learning: Resistant      Endings: None Reported    Modes of Intervention: Activity      Discipline Responsible: /Counselor      Signature:  DEBORA HUNT

## 2024-02-22 NOTE — GROUP NOTE
Group Therapy Note    Date: 2/22/2024    Group Start Time: 12:00 PM  Group End Time:  1:00 PM  Group Topic: Psychotherapy    [facility]    Magdalena Gastelum, MSW        Group Therapy Note    Attendees: 9    Writer facilitated psychotherapy group centered around boundaries. Writer opened by encouraging patients to fill out a boundaries assessment, then asked several discussion questions around if they had healthy boundaries, if any were easier/harder to set. Writer encouraged patients to take turns reading tips about setting and enforcing boundaries. Writer provided some examples of boundary statements and talked through them with patients. Writer encouraged patients to share examples of boundaries they wanted to enforce better in their lives. At the conclusion of the discussion, writer facilitated the group playing a game to build rapport and raise overall mood.        Patient's Goal:  Participate in group therapy, build understanding of healthy boundaries and how to enforce them, build rapport with peers.     Notes:  Patient engaged minimally in group this afternoon. Writer noted her completing the assessment and reflection questions, however she did not engage much with peers and removed herself to sit at a separate table outside of the group. Patient was pulled briefly by individual therapist, and returned to the table when she came back. Patient will continue with identified treatment goals in further groups.     Status After Intervention:  Unchanged    Participation Level: Active Listener and Interactive    Participation Quality: Minimal      Speech:  normal      Thought Process/Content: Logical      Affective Functioning: Congruent      Mood: depressed      Level of consciousness:  Alert and Oriented x4      Response to Learning: Able to retain information and Capable of insight      Endings: None Reported    Modes of Intervention: Education,

## 2024-02-22 NOTE — BH NOTE
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    Criteria for Continued Treatment (check all that apply):   [x] Preventing Decompensation   [x] Improving Level of Functioning  [] Reducing Isolative Behaviors  [] Understanding Diagnosis and Need for Medications  [] Improving Treatment/Medication Compliance  [] Confronting Denial of Illness  [] Stabilizing Level of Functioning  [] Improving Emotional/Social/Cognitive Functioning  [] Decreasing Frequency of Hospitalizations    Suicidal/Homicidal ideations:   [x] Absent  [] Present  [] Passive  [] Intent  [] Plan  [] Death Wishes      CURRENT CONDITION OF PATIENT & PROGRESS ON TREATMENT PLAN    Subjective (ongoing complaints by patient regarding symptom severity, presentation, affect, function, etc.):  Yen Edgar reports getting her Risperdal shot ordered for the 28th to be given at the [facility] pharmacy.  Notes feeling alright and having a good week.    Appropriately interactive and aware. Tolerating medications well.  Appetite is good.  Eating and sleeping fairly.  Feeling  more comfortable in group setting.       Behavior (side effects, changes in mental status, objective observations seen in individual sessions or by staff): Yen Edgar is calm cooperative, clear and coherent with speech of average rate volume and tone.  Moods are fair.  Affect is fair range. Appropriately dressed and groomed.  Denies SI/HI/AH/VH.  No aggression or violence.  Appropriately interactive and aware.  Insight is fair and judgement is fair.        Assessment/Plan (functional improvement and/or ongoing impairment, response to treatment, plan for future ongoing treatment and medication management to include revisions): Continue current care  Groups, milieu and individual therapy  Medication modification as appropriate  Disposition planning with social work          [x] NO NEW Medications at this

## 2024-02-22 NOTE — GROUP NOTE
Group Therapy Note    Date: 2/22/2024    Group Start Time: 10:40 AM  Group End Time: 11:30 AM  Group Topic: Cognitive Skills    [facility]    Katy Patel MSW        Group Therapy Note    Guest speak Camden from [facility] spoke at group. Writer was present to support facilitation and record. Camden facilitated introductions with the group. Camden provided education on his agency, the services they provide, how outpatient therapy works, and specialities of providers. Camden provided brief trauma education. Camden notified pts of an opportunity called Free Therapy Day. Camden provided education on dimensions of wellness and facilitated reflection using a worksheet. Camden encouraged pts to share.    Attendees: 9       Patient's Goal:  learning and applying information on community resources and dimensions of wellness.      Notes: Pt presented as attentive as evidenced by appropriate eye contact. Pt engaged appropriately in peer introductions. Pt asked a peer an appropriate question about their introduction. Pt will continue to work on learning and applying information on community resources and dimensions of wellness.      Status After Intervention:  Unchanged    Participation Level: Active Listener and Interactive    Participation Quality: Appropriate and Attentive      Speech:  normal      Thought Process/Content: Logical      Affective Functioning: Congruent      Mood: euthymic      Level of consciousness:  Alert, Oriented x4, and Attentive      Response to Learning: Able to verbalize current knowledge/experience and Progressing to goal      Endings: None Reported    Modes of Intervention: Education      Discipline Responsible: /Counselor      Signature:  DEBORA HUNT

## 2024-02-22 NOTE — GROUP NOTE
Group Therapy Note    Date: 2/22/2024    Group Start Time:  9:00 AM  Group End Time:  9:45 AM  Group Topic: Community Meeting    [facility]    Jules Lozoya LCSW        Group Therapy Note    This writer facilitated the community group with the patients. The patients were encouraged to complete the daily assessment to identify mood and any safety concerns. The  patients were encouraged to completed a \"favorite things\" activity and discuss with peers to get to know each other.       Attendees: 9       Patient's Goal: Identify mood and favorite things activity.     Notes: The patient completed the morning assessment and denied SI/HI.   The patient participated in the \"favorite things\" exercise and discussed with peers. The patient shared that her favorite  The patient will continue to identify mood and self-disclose during the community group.     Status After Intervention:  Unchanged    Participation Level: Active Listener and Interactive    Participation Quality: Appropriate, Attentive, Sharing, and Supportive      Speech:  normal      Thought Process/Content: Logical      Affective Functioning: Congruent      Mood: euthymic      Level of consciousness:  Alert, Oriented x4, and Attentive      Response to Learning: Able to verbalize current knowledge/experience, Capable of insight, and Progressing to goal      Endings: None Reported    Modes of Intervention: Support, Socialization, and Activity      Discipline Responsible: /Counselor      Signature:  Jules Lozoya LCSW

## 2024-02-22 NOTE — GROUP NOTE
Group Therapy Note    Date: 2/22/2024    Group Start Time:  2:00 PM  Group End Time:  2:45 PM  Group Topic: Wrap-Up    [facility]    Magdalena Gastelum MSW        Group Therapy Note    Attendees: 9    Writer facilitated wrap up group this afternoon, opening by encouraging patients to fill out their check out sheets. Writer then facilitated open creative time for patients as they had received a prompt in the previous group. After 10 minutes, writer led patients in an urge surfing meditation and then processed it, as well as reflecting on the day.        Patient's Goal:  Participate in group therapy, complete check out sheet, engage in urge surfing meditation and reflection.     Notes:  Patient engaged minimally in group this afternoon. She filled out her check in sheet, declining any SI or HI. She participated in the creative coloring activity, but did not interact with her peers. Patient presented as engaged with the meditation but did not share her reactions. Patient will continue with identified treatment goals in further groups.     Status After Intervention:  Unchanged    Participation Level: Active Listener and Interactive    Participation Quality: Appropriate      Speech:  normal      Thought Process/Content: Logical      Affective Functioning: Congruent      Mood: depressed      Level of consciousness:  Alert and Oriented x4      Response to Learning: Able to retain information, Capable of insight, and Progressing to goal      Endings: None Reported    Modes of Intervention: Support, Socialization, Exploration, and Activity      Discipline Responsible: /Counselor      Signature:  DEBORA Harper

## 2024-02-22 NOTE — GROUP NOTE
Group Therapy Note    Date: 2/22/2024    Group Start Time:  9:40 AM  Group End Time: 10:30 AM  Group Topic: Process Group - Outpatient    [facility]    Jules Lozoya LCSW        Group Therapy Note    This writer facilitated the process group. The patients were encouraged to practice a affirmation exercise to start the group. The patients were encouraged to share daily goals and process with peers.     Attendees: 10       Patient's Goal: practice affirmation and participate in goal setting exercises.        Notes: The patient participated in the affirmation practice. The patient participated in the goal setting exercise utilizing the patient's morning check-in. The patient share d that being healthy in mind body and soul is her goal for the day.  The patient will continue to practice affirmation and identify goals in the process group.     Status After Intervention:  Unchanged    Participation Level: Active Listener and Interactive    Participation Quality: Appropriate, Attentive, Sharing, and Supportive      Speech:  normal      Thought Process/Content: Logical      Affective Functioning: Congruent      Mood: euthymic      Level of consciousness:  Alert, Oriented x4, and Attentive      Response to Learning: Able to verbalize current knowledge/experience, Capable of insight, and Progressing to goal      Endings: None Reported    Modes of Intervention: Support, Socialization, and Activity      Discipline Responsible: /Counselor      Signature:  Jules Lozoya LCSW

## 2024-02-23 ENCOUNTER — HOSPITAL ENCOUNTER (OUTPATIENT)
Facility: HOSPITAL | Age: 29
Setting detail: RECURRING SERIES
Discharge: HOME | End: 2024-02-26
Payer: COMMERCIAL

## 2024-02-23 VITALS
OXYGEN SATURATION: 100 % | DIASTOLIC BLOOD PRESSURE: 82 MMHG | TEMPERATURE: 98.6 F | HEART RATE: 87 BPM | SYSTOLIC BLOOD PRESSURE: 115 MMHG

## 2024-02-23 PROCEDURE — 90853 GROUP PSYCHOTHERAPY: CPT

## 2024-02-23 NOTE — GROUP NOTE
Group Therapy Note    Date: 2/23/2024    Group Start Time: 10:40 AM  Group End Time: 11:30 AM  Group Topic: Cognitive Skills    [facility]    Katy Patel MSW        Group Therapy Note    Writer facilitated cognitive skills group. Writer provided education on skills for increasing positive experiences. Writer encouraged engagement by prompting pts to read from the handouts aloud, encouraging pts to identify how the information applies to their lives, and prompting pts to identify positive events to add to their daily or weekly routines.    Attendees: 9       Patient's Goal:   increasing positive experiences.     Notes: Pt presented as attentive. Pt provided feedback to a peer identifying a skill for supporting alertness and attentiveness. Pt will continue to work on increasing positive experiences.     Status After Intervention:  Unchanged    Participation Level: Active Listener    Participation Quality: Appropriate and Attentive      Speech:  normal      Thought Process/Content: Logical      Affective Functioning: Congruent      Mood: euthymic      Level of consciousness:  Alert, Oriented x4, and Attentive      Response to Learning: Able to verbalize current knowledge/experience and Progressing to goal      Endings: None Reported    Modes of Intervention: Education      Discipline Responsible: /Counselor      Signature:  DEBORA HUNT

## 2024-02-23 NOTE — BH NOTE
Pt came to writer at 12:16 stating she had her [employer] transportation scheduled to pick her up early due to receiving notification that her trip had been cancelled and the transportation company telling her they will not have a  available at 2:50 to pick her up on time. Pt reported to writer that she had had multiple instances this week of drivers not picking her up in the morning and being picked up instead by [employer]. Pt stated she believed the transportation company was sending drivers to her old address despite the pt having called to have the address updated. Writer agreed to call the transportation dispatch number to confirm future trips and escalate the issue. Pt thanked writer and filled out wrap up sheet, endorsing no SI/HI and left PHP at 12:20.    DEBORA Mack

## 2024-02-23 NOTE — GROUP NOTE
Group Therapy Note    Date: 2/23/2024    Group Start Time:  9:40 AM  Group End Time: 10:30 AM  Group Topic: Process Group - Outpatient    [facility]    Magdalena Gastelum, DEBORA        Group Therapy Note    Attendees: 10    Writer facilitated process group this morning. Writer opened by asking patients if they had anything that they wanted to bring to the group and process. When they indicated they did not, writer moved into values clarification activity. Writer provided activity to patients asking them to choose ten of their most important values from a larger list and to rank them. Writer then asked patients to share and facilitated discussion with values related questions. Writer tied in values to goal setting, boundaries, relationships, and other concepts patients have been learning about in therapy.        Patient's Goal:  Participate in group therapy, build self awareness around values, and increase understanding of how values interact with other concepts learned in treatment.     Notes:  Patient engaged well in group this morning. She shared that she carried values of love, morals, loyalty, spirituality, and power. When writer prompted patient to elaborate on what power means for her, patient shared that she strongly valued empowering herself to make positive changes and have control over her life. Patient presented as receptive to feedback and encouragement from writer. Patient will continue with identified treatment goals in further groups.     Status After Intervention:  Improved    Participation Level: Active Listener and Interactive    Participation Quality: Appropriate, Attentive, and Sharing      Speech:  normal      Thought Process/Content: Logical      Affective Functioning: Congruent      Mood: euthymic      Level of consciousness:  Alert and Oriented x4      Response to Learning: Able to verbalize current knowledge/experience, Capable of

## 2024-02-23 NOTE — GROUP NOTE
Group Therapy Note    Date: 2/23/2024    Group Start Time:  9:00 AM  Group End Time:  9:45 AM  Group Topic: Community Meeting    [facility]    Jules Lozoya LCSW        Group Therapy Note    This writer facilitated the community group. The patients were encouraged to complete the morning assessment to identify mood and any safety concerns. The patients were encouraged to participate in an affirmation exercise and a love and kindness meditation led by a peers. The patients were encouraged to discuss recent events for support, feedback, and advice from peers.     Attendees: 10       Patient's Goal: Identify emotions and self-disclose      Notes: The patient completed the morning check in assessment and denied SI/HI. The patient participated in a discussion the previous evening. The patient displayed empathy towards a peer who struggling with doctors not listening. The patient will continue to identify emotions and self-disclose in the community group.      Status After Intervention:  Unchanged    Participation Level: Active Listener and Interactive    Participation Quality: Appropriate, Attentive, Sharing, and Supportive      Speech:  normal      Thought Process/Content: Logical      Affective Functioning: Congruent      Mood: euthymic      Level of consciousness:  Alert, Oriented x4, and Attentive      Response to Learning: Able to verbalize current knowledge/experience, Capable of insight, and Progressing to goal      Endings: None Reported    Modes of Intervention: Support, Socialization, and Activity      Discipline Responsible: /Counselor      Signature:  Jules Lozoya LCSW

## 2024-02-26 ENCOUNTER — HOSPITAL ENCOUNTER (OUTPATIENT)
Facility: HOSPITAL | Age: 29
Setting detail: RECURRING SERIES
Discharge: HOME | End: 2024-02-29
Payer: COMMERCIAL

## 2024-02-26 VITALS
TEMPERATURE: 98.4 F | OXYGEN SATURATION: 100 % | HEART RATE: 71 BPM | DIASTOLIC BLOOD PRESSURE: 73 MMHG | SYSTOLIC BLOOD PRESSURE: 103 MMHG

## 2024-02-26 PROCEDURE — 90832 PSYTX W PT 30 MINUTES: CPT

## 2024-02-26 PROCEDURE — 90853 GROUP PSYCHOTHERAPY: CPT

## 2024-02-26 NOTE — BH NOTE
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        Psychotherapy Session    Start time: 1230  Stop time: 1300        Patient Mental Status and Mood/Affect:Calm, disorganized    Patient Behavior and Appearance: Evasive shows no evidence of impairment    Intervention/Techniques: Guided and Other: treatment and discharge plan    Focus of Session/Patient Response and Progress Towards Goal: Review and discuss treatment plan, work on discharge plan, and confirm medication compliance.     Narrative: The patient denied SI/HI during this meeting. The patient was disorganized and difficult to follow in conversation due to disorganization. The patient mentioned continuing education in [place], uncertainty of when they would move to [place] to do this, uncertainty of continuing outpatient treatment upon discharging from the program and a desire to get \"back to work\" while this writer was reviewing treatment plan.     This writer and the patient contacted the patient mother to assist in coordinating the patient's medication  and administration at [facility]. The patient's mother did not answer. This writer left a message.    This writer notes that disorganization, flat affect, and tangential thought process have increased in individual session and in group since last individual session. Patient medication compliance is important for continued treatment.     This writer and patient reviewed experience in the group and discuss progress towards goals. This writer confirmed anticipated discharge date of 3/15/2024.     The patient signed their treatment plan.    The patient is appropriate for continued treatment at [facility] until anticipated discharge date of 3/15/2024

## 2024-02-26 NOTE — GROUP NOTE
Group Therapy Note    Date: 2/26/2024    Group Start Time:  2:00 PM  Group End Time:  2:45 PM  Group Topic: Wrap-Up    [facility]    Magdalena Gastelum, MSW        Group Therapy Note    Attendees: 7    Writer facilitated wrap up group this afternoon. Writer opened by encouraging patients to fill out their wrap up sheets while continuing the processing from the previous group. At the close of this discussion, writer asked patients to set and share a goal for the week. To end group, writer provided patients with a 6 minute guided meditation centered around self love.        Patient's Goal:  Participate in group therapy, complete wrap up sheet, set a goal for the week, and participate in mindfulness meditation.     Notes:  Patient engaged well in group this afternoon. She continued her sharing about her \"twin flame\", sharing that he was her first love and they have had an on and off relationship for years. Patient shared that this individual is with someone else and has children with them. She shared that this relationship causes a lot of frustration in her life and she is not sure why she can't move on. Writer reflected that it sounded like this person is very similar to her and that it can force some self reflection to socialize with people who are very similar to ourselves. Patient presented as receptive to feedback and encouragement. She filled out her wrap up sheet, declining any SI or HI. Patient will continue with identified treatment goals in further groups.     Status After Intervention:  Improved    Participation Level: Active Listener and Interactive    Participation Quality: Appropriate, Attentive, and Sharing      Speech:  normal      Thought Process/Content: Logical      Affective Functioning: Congruent      Mood: euthymic      Level of consciousness:  Alert and Oriented x4      Response to Learning: Able to verbalize current 
                                                                      Group Therapy Note    Date: 2/26/2024    Group Start Time:  9:00 AM  Group End Time:  9:40 AM  Group Topic: Community Meeting    [facility]    Heather Landrum        Group Therapy Note  This writer facilitated a community group that encouraged patients to check in with how their weekend went and how they were feeling today. Patients were asked to complete the check-in form. After allowing time for this, this writer encouraged patients to complete the Melanie, Yehuda, and Bud activity to reflect on positives, challenges, and upcoming events. This writer used open ended questions, clarifying, and reflective statements to prompt further discussion.    Attendees: 9       Patient's Goal:  To complete check-in form and reflect on positives and challenges experienced.     Notes:  The patient presented well to the session. The patient completed the check in form and did not identify any SI or self harm. The patient was prompted to reflect on a positive, challenging, or upcoming situation. The patient responded that they are looking forward to returning to work. The patient sat quietly during the remainder of the group and was observed coloring a picture. The patient will continue to work on reflection in further groups.     Status After Intervention:  Unchanged    Participation Level: Active Listener and Minimal    Participation Quality: Appropriate, Attentive, and Sharing      Speech:  normal      Thought Process/Content: Logical  Linear      Affective Functioning: Congruent      Mood: euthymic      Level of consciousness:  Alert, Oriented x4, and Attentive      Response to Learning: Able to verbalize current knowledge/experience, Able to retain information, and Capable of insight      Endings: None Reported    Modes of Intervention: Support, Socialization, Exploration, and Clarifying      Discipline Responsible: Social 
                                                                      Group Therapy Note    Date: 2/26/2024    Group Start Time:  9:40 AM  Group End Time: 10:30 AM  Group Topic: Process Group - Outpatient    [facility]    Heather Landrum        Group Therapy Note  This writer facilitated a process group by encouraging the patients to share about any recent thoughts, emotions, feelings, or situations, that they have experienced, in order to gain support and feedback from their peers. During this conversation the topic of conflict and preconceived notions were discussed. After this, patients completed an activity describing mental health challenges.This writer used open ended questions to encourage patients to reflect on their experiences and mental health challenges.     Attendees: 10       Patient's Goal:  To process thoughts and feelings and provide support and feedback to peers.    Notes:  The patient presented well to the session. The patient appeared quiet during the group as she was observed coloring a picture. The patient declined to share any thoughts or situations to process. The patient will continue to work on processing emotions and thoughts in further groups.     Status After Intervention:  Unchanged    Participation Level: Active Listener    Participation Quality: Appropriate and Attentive      Speech:  normal      Thought Process/Content: Logical  Linear      Affective Functioning: Congruent      Mood: euthymic      Level of consciousness:  Alert, Oriented x4, and Preoccupied      Response to Learning: Able to verbalize current knowledge/experience      Endings: None Reported    Modes of Intervention: Support, Socialization, and Exploration      Discipline Responsible: /Counselor      Signature:  DEBORA Adams Student.    
                                                                      Group Therapy Note    Date: 2/26/2024    Group Start Time: 10:40 AM  Group End Time: 11:30 AM  Group Topic: Cognitive Skills    [facility]    Magdalena Gastelum, MSW        Group Therapy Note    Attendees: 6    Writer facilitated cognitive skills group this morning. A new patient joined at the beginning of group, so writer facilitated introductions and a brief review of group rules. Writer then introduced the topic of forgiveness by asking patients what it meant to them. Writer explained stages of forgiveness, and the idea of it as an internal process that not everyone is ready for forgiveness. Writer then asked patients to complete the essential forgiveness worksheet, and encouraged them to share at the end.        Patient's Goal:  Participate in group therapy, build rapport with group members, deepen understanding of forgiveness as a concept and their own experiences with it.     Notes:  Patient engaged well in group this morning. She participated in the introductions, sharing that she had had a good experience in group so far and that she is very motivated to do the work. Patient presented as engaged in the forgiveness activity, but did not share what she had written about. Patient interacted appropriately with peers throughout group and will continue with identified treatment goals in further groups.     Status After Intervention:  Unchanged    Participation Level: Active Listener and Interactive    Participation Quality: Appropriate, Attentive, and Sharing      Speech:  normal      Thought Process/Content: Logical      Affective Functioning: Congruent      Mood: euthymic      Level of consciousness:  Alert and Oriented x4      Response to Learning: Able to verbalize current knowledge/experience, Capable of insight, and Progressing to goal      Endings: None Reported    Modes of Intervention: Education, Socialization, and Exploration      Discipline 
Progressing to goal      Endings: None Reported    Modes of Intervention: Education, Socialization, and Exploration      Discipline Responsible: /Counselor      Signature:  DEBORA Harper    
Worker/Counselor      Signature:  Heather Landrum, MSW Student.

## 2024-02-27 ENCOUNTER — HOSPITAL ENCOUNTER (OUTPATIENT)
Facility: HOSPITAL | Age: 29
Setting detail: RECURRING SERIES
Discharge: HOME | End: 2024-03-01
Payer: COMMERCIAL

## 2024-02-27 VITALS
SYSTOLIC BLOOD PRESSURE: 95 MMHG | OXYGEN SATURATION: 100 % | TEMPERATURE: 98.3 F | DIASTOLIC BLOOD PRESSURE: 69 MMHG | HEART RATE: 72 BPM

## 2024-02-27 PROCEDURE — 90853 GROUP PSYCHOTHERAPY: CPT

## 2024-02-27 NOTE — BH NOTE
PHP  Psychiatric Progress Note    Patient: Yen Edgar MRN: [id number]  SSN: [id number]    YOB: 1995  Age: 29 y.o.  Sex: female        Subjective:     Yen Edgar  reports feeling well and moods are fine.  Denies SI/HI/AH/VH.  No aggression or violence.  Appropriately interactive and aware. Tolerating medications well.  Eating OK and sleeping well.    Objective:     Vitals:    02/27/24 0845   BP: 95/69   Pulse: 72   Temp: 98.3 °F (36.8 °C)   TempSrc: Oral   SpO2: 100%        Mental Status Exam:       Orientation person, place, time/date, and situation   Eye Contact appropriate   Appearance:  casually dressed   Thought Process: tangential   Thought Content no hallucinations and no delusions   Suicidal ideations none   Homicidal ideations none   Mood:  within normal limits   Affect:  normal           MEDICATIONS:  Current Outpatient Medications   Medication Sig    [START ON 2/28/2024] risperiDONE ER (PERSERIS) 120 MG PRSY subcutaneous injection Inject 0.8 mLs into the skin every 28 days Next injection due on 02/28/24     No current facility-administered medications for this encounter.       the risks and benefits of the proposed medication    Lab/Data Review:  All lab results for the last 24 hours reviewed.    Discussed her upcoming Risperdal ROLAND--she plans to go to pharmacy tomorrow    Assessment:     Active Problems:    * No active hospital problems. *  Resolved Problems:    * No resolved hospital problems. *      Plan:     Continue current care  No med changes--to have ROLAND tomorrow  Disposition planning with social work    Signed By: Richa Gorman MD     February 27, 2024

## 2024-02-27 NOTE — GROUP NOTE
Group Therapy Note    Date: 2/27/2024    Group Start Time:  2:00 PM  Group End Time:  2:45 PM  Group Topic: Wrap-Up    [facility]    Magdalena Gastelum, MSW        Group Therapy Note    Attendees: 7    Writer facilitated wrap up group this afternoon, opening by encouraging patients to fill out their check out sheets. Writer then provide patients the \"7 Attitudes of Mindfulness\" information sheet. Writer encouraged patients to take turns reading aloud and facilitated discussion around these 7 attitudes. Following this, writer provided patients with 2 post its and asked them to write on one of them something that had stuck with them and on the other something they wanted to let go of. Writer then facilitated trash can basketball with the things patients wanted to let go of before the end of the day.        Patient's Goal:  Participate in group therapy, deepen understanding of the mindset surrounding mindfulness, identify something that stuck with them and something they wanted to let go of.     Notes:  Patient engaged fairly in group this afternoon. She filled out her wrap up sheet, declining any SI or HI. She identified low levels of depression and anger and higher levels of anxiety. Patient handed in a post it that listed stress, money, and heartbreak. Patient presented as somewhat frustrated with a group member during this time, though was not rude. Patient will continue with identified treatment goals in further groups.     Status After Intervention:  Decompensated    Participation Level: Active Listener and Interactive    Participation Quality: Appropriate and Attentive      Speech:  normal      Thought Process/Content: Logical      Affective Functioning: Blunted      Mood: anxious and irritable      Level of consciousness:  Alert and Oriented x4      Response to Learning: Able to verbalize current knowledge/experience, Capable of insight, and 
                                                                      Group Therapy Note    Date: 2/27/2024    Group Start Time:  9:00 AM  Group End Time:  9:40 AM  Group Topic: Community Meeting    [facility]    Magdalena Gastelum MSW        Group Therapy Note    Attendees: 7    Writer facilitated community check in group this morning. Writer opened by encouraging patients to fill out their check in sheets. Writer asked if patients had anything they wanted or needed to share with the group. When they did not, writer provided them with the Letter to Self journal prompt and asked them to engage with this. The prompt was, \"Write a letter to yourself to be opened around the time you complete treatment. In your letter, include the goals you hope to have accomplished.\"        Patient's Goal:  Participate in group therapy, complete check in sheet, engage in reflective journaling exercise.     Notes:  Patient engaged fairly in group this morning. She filled out her check in sheet, declining any SI or thoughts of self harm. She identified middling levels of depression and anxiety. Patient presented as preoccupied during the activity as evidenced by staring into space intermittently. Patient was interactive when prompted and engaged minimally with peers during this time. Patient will continue with identified treatment goals in further groups.     Status After Intervention:  Unchanged    Participation Level: Active Listener and Interactive    Participation Quality: Appropriate      Speech:  normal      Thought Process/Content: Logical      Affective Functioning: Congruent      Mood: depressed      Level of consciousness:  Alert, Oriented x4, and Preoccupied      Response to Learning: Able to verbalize current knowledge/experience, Capable of insight, and Progressing to goal      Endings: None Reported    Modes of Intervention: Support, Socialization, and Exploration      Discipline Responsible: /Counselor      Signature: 
                                                                      Group Therapy Note    Date: 2/27/2024    Group Start Time:  9:40 AM  Group End Time: 10:30 AM  Group Topic: Process Group - Outpatient    [facility]    Magdalena Gastelum MSW        Group Therapy Note    Attendees: 7    Writer facilitated process group this morning. Writer asked patients to share their letter to self from the previous group. Writer encouraged patients to reflect and share about what \"getting better\" looks like for them as well as what writing the letter felt like.        Patient's Goal:  Participate in group therapy, engage in open sharing and processing with peers, share the letter to self from previous group.     Notes:  Patient engaged well in group this morning. She shared the letter she had written about her longer term goals and shared that group has helped her feel her feelings more. Patient shared that feeling better to her feels like a [place] beach with a cool breeze. Patient interacted appropriately with peers throughout group and will continue with identified treatment goals in further groups.     Status After Intervention:  Improved    Participation Level: Active Listener and Interactive    Participation Quality: Appropriate, Attentive, and Sharing      Speech:  normal      Thought Process/Content: Logical      Affective Functioning: Congruent      Mood: euthymic      Level of consciousness:  Alert and Oriented x4      Response to Learning: Able to verbalize current knowledge/experience, Capable of insight, and Progressing to goal      Endings: None Reported    Modes of Intervention: Support, Socialization, and Exploration      Discipline Responsible: /Counselor      Signature:  DEBORA Harper    
                                                                      Group Therapy Note    Date: 2/27/2024    Group Start Time: 10:40 AM  Group End Time: 11:30 AM  Group Topic: Cognitive Skills    [facility]    Heather Landrum        Group Therapy Note  This writer facilitated a cognitive skills group on ways improving wellness. This writer what wellness encompassed. After reviewing this, this writer encouraged patients to complete a wellness assessment. This writer then asked patients to reflect on their current ratings and discuss ways to improve their overall wellness. This writer used open ended questions to prompt further discussion.     Attendees: 7       Patient's Goal:  To discuss ways to improve wellness.     Notes:  The patient presented well to the session. She completed the wellness assessment and shared her ratings for feelings of self, sleep, relationships, and healthy habits. This writer used open ended questions to promote discussion regarding the reasoning for each rating. This writer then initiated a discussion on ways to improve the different categories. The patient will continue to work on improving wellness in further groups.     Status After Intervention:  Unchanged    Participation Level: Active Listener and Minimal    Participation Quality: Appropriate, Attentive, and Sharing      Speech:  normal      Thought Process/Content: Logical  Linear      Affective Functioning: Congruent      Mood: euthymic      Level of consciousness:  Alert, Oriented x4, and Attentive      Response to Learning: Able to verbalize current knowledge/experience, Able to verbalize/acknowledge new learning, Able to retain information, and Capable of insight      Endings: None Reported    Modes of Intervention: Education, Support, Socialization, Exploration, and Clarifying      Discipline Responsible: /Counselor      Signature:  DEBORA Adams Student     
                                                                      Group Therapy Note    Date: 2/27/2024    Group Start Time: 12:00 PM  Group End Time:  1:00 PM  Group Topic: Psychotherapy    [facility]    Magdalena Gastelum MSW        Group Therapy Note    Attendees: 7    Writer facilitated psychoeducation group this afternoon. Writer accompanied patients to the [facility] area to participate in a journaling activity. Writer asked patients to write a letter to a future PHP patient (or someone starting their healing journey) after reflecting on their own letters from this morning. Writer encouraged patients to engage with their senses while outside. After 20 minutes, writer accompanied patients back inside and asked them to share their letters.        Patient's Goal:  Participate in group therapy, reflect on their letter from earlier in the day and write a letter to a future PHP patient/someone starting their healing journey.     Notes:  Patient engaged fairly in group this afternoon. She presented as engaged in the activity, however when the group returned inside patient presented with a lower affect and declined to share her letter. Patient will continue with identified treatment goals in further groups.     Status After Intervention:  Decompensated    Participation Level: Active Listener    Participation Quality: Appropriate and Attentive      Speech:  normal      Thought Process/Content: Logical      Affective Functioning: Congruent      Mood: depressed      Level of consciousness:  Alert and Oriented x4      Response to Learning: Able to verbalize current knowledge/experience, Capable of insight, and Progressing to goal      Endings: None Reported    Modes of Intervention: Exploration, Activity, and Movement      Discipline Responsible: /Counselor      Signature:  DEBORA Harper    
Clarifying      Discipline Responsible: /Counselor      Signature:  Heather Landrum MSW Student

## 2024-02-28 ENCOUNTER — HOSPITAL ENCOUNTER (OUTPATIENT)
Facility: HOSPITAL | Age: 29
Setting detail: RECURRING SERIES
Discharge: HOME | End: 2024-03-02
Payer: COMMERCIAL

## 2024-02-28 VITALS
TEMPERATURE: 97.6 F | DIASTOLIC BLOOD PRESSURE: 79 MMHG | HEART RATE: 69 BPM | OXYGEN SATURATION: 99 % | SYSTOLIC BLOOD PRESSURE: 100 MMHG

## 2024-02-28 PROCEDURE — 90853 GROUP PSYCHOTHERAPY: CPT

## 2024-02-28 NOTE — GROUP NOTE
Group Therapy Note    Date: 2/28/2024    Group Start Time: 12:00 PM  Group End Time:  1:00 PM  Group Topic: Psychotherapy    [facility]    Magdalena Gastelum MSW        Group Therapy Note    Attendees: 6    Writer facilitated psychotherapy group centered around creativity and reflection. Writer encouraged patients to reflect on the Tree of Life activity from this morning. Writer provided craft materials including poster paper, markers, glue, and collage supplies and asked patients to create their tree however they liked. Writer facilitated creative time after reviewing the main points of the earlier activity.        Patient's Goal:  Participate in group therapy, engage in reflection of earlier activity as well as creative expression related to the same activity (Tree of Life).    Notes:  Patient engaged well in group this afternoon. She sat at the table with another peer and writer noted her participating in the activity throughout group. Patient interacted appropriately with peers and expressed enjoying arts and crafts. Patient will continue with identified treatment goals in further groups.     Status After Intervention:  Unchanged    Participation Level: Active Listener and Interactive    Participation Quality: Appropriate and Attentive      Speech:  normal      Thought Process/Content: Logical      Affective Functioning: Congruent      Mood: euthymic      Level of consciousness:  Alert and Oriented x4      Response to Learning: Able to verbalize current knowledge/experience, Capable of insight, and Progressing to goal      Endings: None Reported    Modes of Intervention: Socialization, Exploration, and Activity      Discipline Responsible: /Counselor      Signature:  DEBORA Harper

## 2024-02-28 NOTE — GROUP NOTE
Group Therapy Note    Date: 2/28/2024    Group Start Time: 10:40 AM  Group End Time: 11:30 AM  Group Topic: Cognitive Skills    [facility]    Lulú Decker MSW        Group Therapy Note    Patients were asked to work through creating a fear ladder together. Patients were given a sheet describing the steps of creating a fear ladder and how it can be used to combat fear and avoidance. Patients were asked to create their own fear ladders and discuss steps they can take to challenge fears that are getting in their way.    Attendees: 6       Patient's Goal:  Create fear ladder, engage with peers, identify steps to challenge avoidance    Notes:  The patient engaged minimally in cognitive skills group. The patient sat quietly for the duration of the group activity. The patient discussed creating a goal surrounding dancing and modeling again. The patient will continue to practice engaging in groups.    Status After Intervention:  Unchanged    Participation Level: Active Listener and Minimal    Participation Quality: Appropriate, Attentive, and Sharing      Speech:  normal      Thought Process/Content: Logical  Linear      Affective Functioning: Blunted      Mood: euthymic      Level of consciousness:  Alert, Oriented x4, and Attentive      Response to Learning: Able to verbalize current knowledge/experience and Progressing to goal      Endings: None Reported    Modes of Intervention: Education and Activity      Discipline Responsible: /Counselor      Signature:  DEBORA Mack

## 2024-02-28 NOTE — GROUP NOTE
Group Therapy Note    Date: 2/28/2024    Group Start Time:  2:00 PM  Group End Time:  2:45 PM  Group Topic: Community Meeting    [facility]    Lulú Decker MSW        Group Therapy Note    Patients were given a wrap up sheet to assess mood, SI/HI. Patients were offered space and time to reflect on the treatment day. Patients were given a sheet asking them to identify where in their body they feel their emotions. Patients were lead through a progressive muscle relaxation to increase relaxation.    Attendees: 6       Patient's Goal:  Reflect on treatment, identify physical sensations of emotions, engage in relaxation exercise    Notes:  The patient engaged minimally in wrap up group. The patient identified no SI/HI on wrap up sheet. The patient completed the emotions worksheet and declined to share where she feels emotions. The patient appeared to engage in the relaxation exercise. The patient will continue to practice engaging in groups and using coping skills.    Status After Intervention:  Unchanged    Participation Level: Active Listener and Minimal    Participation Quality: Appropriate and Attentive      Speech:  normal      Thought Process/Content: Logical  Linear      Affective Functioning: Congruent      Mood: euthymic      Level of consciousness:  Alert, Oriented x4, and Attentive      Response to Learning: Progressing to goal      Endings: None Reported    Modes of Intervention: Exploration and Activity      Discipline Responsible: /Counselor      Signature:  DEBORA Mack

## 2024-02-28 NOTE — GROUP NOTE
Group Therapy Note    Date: 2/28/2024    Group Start Time:  9:00 AM  Group End Time:  9:40 AM  Group Topic: Community Meeting    [facility]    Lulú Decker MSW        Group Therapy Note    Patients were given a check-in assessment to assess mood, SI/HI. Patients were given a sheet asking to write a postcard to someone they feel frustrated with or want to show appreciation to. Patients were asked to share about their postcards. Patients were asked to identify goals for the day.    Attendees: 10       Patient's Goal:  Identify goal for the day, reflect on relationships and express emotions    Notes:  The patient engaged minimally in check-in group. The patient identified writing a postcard to her \"twin flame\" about getting closure. The patient declined to share more. The patient identified no SI/HI on her check in sheet. The patient will continue to practice engaging in groups and reflecting.    Status After Intervention:  Unchanged    Participation Level: Active Listener and Interactive    Participation Quality: Appropriate, Attentive, and Sharing      Speech:  normal      Thought Process/Content: Logical  Linear      Affective Functioning: Blunted      Mood: euthymic      Level of consciousness:  Alert, Oriented x4, and Attentive      Response to Learning: Able to verbalize current knowledge/experience and Progressing to goal      Endings: None Reported    Modes of Intervention: Exploration and Activity      Discipline Responsible: /Counselor      Signature:  DEBORA Mack

## 2024-02-28 NOTE — BH NOTE
This write and patient contacted pharmacy and ensured that her medication would be covered by insurance and scheduled an appointment for the medication to be administered on 2/29/2024 at 3:30 pm    -Jules Lozoya LCSW.

## 2024-02-28 NOTE — GROUP NOTE
Group Therapy Note    Date: 2/28/2024    Group Start Time:  9:40 AM  Group End Time: 10:30 AM  Group Topic: Process Group - Outpatient    [facility]    Magdalena Gastelum MSW        Group Therapy Note    Attendees: 7    Writer facilitated process group this morning. Writer opened by asking if patients had anything they would like to share with the group. Once all patients who wanted to share had done so, writer introduced the Tree of Life activity, explaining the meaning of the different parts of the tree. Writer encouraged patients to work through the activity now and explained that writer would be having them do a related craft in a later group.        Patient's Goal:  Participate in group therapy, engage in open sharing and processing with peers, reflect on aspects of the Tree of Life activity and complete the prompts.     Notes:  Patient engaged fairly in group this morning. She interacted appropriately with peers during sharing, though she did not share anything herself. Patient presented as attentive to the activity as evidenced by writing on the paper and remaining focused. Patient will continue with identified treatment goals in further groups.     Status After Intervention:  Unchanged    Participation Level: Active Listener and Interactive    Participation Quality: Appropriate and Attentive      Speech:  normal      Thought Process/Content: Logical      Affective Functioning: Congruent      Mood: euthymic      Level of consciousness:  Alert and Oriented x4      Response to Learning: Able to verbalize current knowledge/experience, Capable of insight, and Progressing to goal      Endings: None Reported    Modes of Intervention: Support, Socialization, and Exploration      Discipline Responsible: /Counselor       Signature:  DEBORA Harper

## 2024-02-28 NOTE — GROUP NOTE
Group Therapy Note    Date: 2/28/2024    Group Start Time:  1:10 PM  Group End Time:  2:00 PM  Group Topic: Relapse Prevention    [facility]    Magdalena Gastelum MSW        Group Therapy Note    Attendees: 6    Writer facilitated sharing about activity in previous group for the first part of group this afternoon, and for the second half writer was joined by Sondra , who shared her story and experience with substance abuse over the course of years.        Patient's Goal:  Participate in group activity, share creative expression project, and deepen understanding of substance use recovery.     Notes:  Patient engaged fairly in group this afternoon. She came to group somewhat late, and did share her tree. Patient stated she might have done it wrong, but writer provided validation and active listening. Patient explained in a disorganized way what her goals were for the future--she had used her project as a kind of vision board. Patient presented as attentive during the guest speaker presentation and will continue with identified treatment goals in further groups.     Status After Intervention:  Unchanged    Participation Level: Active Listener and Interactive    Participation Quality: Appropriate and Attentive      Speech:  normal      Thought Process/Content: Logical      Affective Functioning: Congruent      Mood: euthymic      Level of consciousness:  Alert and Oriented x4      Response to Learning: Able to verbalize current knowledge/experience, Capable of insight, and Progressing to goal      Endings: None Reported    Modes of Intervention: Education, Support, and Exploration      Discipline Responsible: /Counselor      Signature:  DEBORA Harper

## 2024-02-29 ENCOUNTER — HOSPITAL ENCOUNTER (OUTPATIENT)
Facility: HOSPITAL | Age: 29
Setting detail: RECURRING SERIES
End: 2024-02-29
Payer: COMMERCIAL

## 2024-02-29 VITALS
DIASTOLIC BLOOD PRESSURE: 72 MMHG | SYSTOLIC BLOOD PRESSURE: 102 MMHG | TEMPERATURE: 98.4 F | OXYGEN SATURATION: 99 % | HEART RATE: 71 BPM

## 2024-02-29 PROCEDURE — 90853 GROUP PSYCHOTHERAPY: CPT

## 2024-02-29 NOTE — GROUP NOTE
Group Therapy Note    Date: 2/29/2024    Group Start Time: 10:40 AM  Group End Time: 11:30 AM  Group Topic: Cognitive Skills    [facility]    Magdalena Gastelum, DEBORA        Group Therapy Note    Attendees: 6    Writer facilitated cognitive skills group centered around building positive experiences this morning. Writer opened with a rapport building question for patients, asking if they had a huge plot of land, what animals would they fill it with? After everyone answered, writer moved into activity, asking patients to identify a) ways to improve on a good mood, b) ways to challenge a medium mood, and c) ways to raise a low mood. Writer then facilitated sharing until close of group.        Patient's Goal:  Participate in group therapy, build rapport with therapist and peers, identify ways to build mastery and increase positive experiences.     Notes:  Patient engaged fairly in group this morning. She shared that if she had unlimited land, she would have lions, alligators, giraffes, horses, and cows. Patient identified that to challenge a low mood, she might eat a healthy snack, exercise, or read. To challenge a medium mood, she might dance, or engage in something creative. When asked about improving a good mood, she identified that she'd want to \"raise her vibrations\". Writer prompted for clarification, and patient stated it might look like continuing the things that had put her in a good mood in the first place. Patient will continue with identified treatment goals in further groups.     Status After Intervention:  Unchanged    Participation Level: Active Listener and Interactive    Participation Quality: Appropriate and Attentive      Speech:  normal      Thought Process/Content: Logical      Affective Functioning: Congruent      Mood: euthymic      Level of consciousness:  Alert and Oriented x4      Response to Learning: Able to verbalize current  knowledge/experience, Capable of insight, and Progressing to goal      Endings: None Reported    Modes of Intervention: Education, Exploration, and Activity      Discipline Responsible: /Counselor      Signature:  DEBORA Harper

## 2024-02-29 NOTE — GROUP NOTE
Group Therapy Note    Date: 2/29/2024    Group Start Time: 12:00 PM  Group End Time:  1:00 PM  Group Topic: Psychotherapy    [facility] Sabiha Caruso        Group Therapy Note      In the psychotherapy group, this writer introduced on the topic of sleep hygiene. This writer discussed the importance of having good sleep hygiene and ways in which patients can positively impact their sleep hygiene (setting a schedule, avoiding caffeine, avoiding napping, exercising, eating well, taking medication, etc.). This writer then prompted patients to take a sleep quiz, informing them of how well they prioritize their sleep.    Attendees: 7       Patient's Goal:  Engage in sleep hygiene activity     Notes:  This pt was present in the psychotherapy group, however complained of a migraine headache and did not engage in group discussion or sleep hygiene activity. The pt supported peers through active listening during group discussion and will continue to learn about sleep hygiene in the future psychotherapy groups.     Status After Intervention:  Unchanged    Participation Level: Active Listener and Minimal    Participation Quality: Attentive      Speech:  mute      Thought Process/Content: Logical      Affective Functioning: Congruent      Mood: dysphoric      Level of consciousness:  Drowsy      Response to Learning: Progressing to goal      Endings: None Reported    Modes of Intervention: Education, Clarifying, and Activity      Discipline Responsible: /Counselor      Signature:  Sabiha Gaston

## 2024-02-29 NOTE — GROUP NOTE
Group Therapy Note    Date: 2/29/2024    Group Start Time:  1:10 PM  Group End Time:  2:00 PM  Group Topic: Psychoeducation    [facility]    Magdalena Gastelum MSW        Group Therapy Note    Attendees: 7    Writer facilitated psychoeducation group centered around anxiety. Writer opened by asking patients what their experience with anxiety is, then provided information sheet that writer read over with the group. Writer facilitated sharing throughout. Writer then showed patients a [employer] Talk on the subject, and facilitated short discussion afterward until end of group.        Patient's Goal:  Participate in group therapy, deepen understanding of anxiety symptoms, experiences, and treatments.     Notes:  Patient engaged fairly in group this afternoon. She presented as attentive but quiet throughout group, demonstrating appropriate eye contact and nodding. Patient did not share about her own experience with anxiety. She will continue with identified treatment goals in further groups.     Status After Intervention:  Unchanged    Participation Level: Active Listener    Participation Quality: Appropriate      Speech:  normal      Thought Process/Content: Logical      Affective Functioning: Congruent      Mood: euthymic      Level of consciousness:  Alert and Oriented x4      Response to Learning: Able to verbalize current knowledge/experience, Capable of insight, and Progressing to goal      Endings: None Reported    Modes of Intervention: Education, Exploration, and Media      Discipline Responsible: /Counselor      Signature:  DEBORA Harper

## 2024-02-29 NOTE — GROUP NOTE
Group Therapy Note    Date: 2/29/2024    Group Start Time:  9:00 AM  Group End Time:  9:40 AM  Group Topic: Community Meeting    [facility]    Sabiha Gaston        Group Therapy Note    In the community group, this writer provided patients with their behavioral check-in sheets to identify treatment goals and current feelings of SI/HI. This writer provided patients with a sheet describing how language has the power to change their mindset. This writer had patients review how the term \"yet\" can be used in language to change negative self talk and increase positive experiences. This writer supported pt processing through the used of psychoeducation and CBT.     Attendees: 6       Patient's Goal: Engage in discussion on the power of language      Notes:  This pt was present in the community group sharing no SI/HI, however, was preoccupied, making a bracelet during the discussion on the use of language and its effect on the mindset. This pt will continue to engage in future community groups and learn about how language can increase positive experiences.     Status After Intervention:  Unchanged    Participation Level: Active Listener    Participation Quality: Appropriate      Speech:  normal      Thought Process/Content: Logical      Affective Functioning: Congruent      Mood: euthymic      Level of consciousness:  Preoccupied      Response to Learning: Able to verbalize current knowledge/experience and Progressing to goal      Endings: None Reported    Modes of Intervention: Education and Clarifying      Discipline Responsible: /Counselor      Signature:  Sabiha Gaston

## 2024-02-29 NOTE — GROUP NOTE
Group Therapy Note    Date: 2/29/2024    Group Start Time:  2:00 PM  Group End Time:  2:45 PM  Group Topic: Wrap-Up    [facility]    Magdalena Gastelum MSW        Group Therapy Note    Attendees: 7    Writer facilitated wrap up group, opening by encouraging patients to fill out their wrap up sheets. When several patients expressed some frustration, writer facilitated processing and discussion of boundaries and confrontation between peers. To end group, writer led patients in a meditation centered around self love.        Patient's Goal:  Participate in group therapy, complete wrap up sheet, engage in self love mediation.     Notes:  Patient engaged minimally in group--she filled out her wrap up sheet, declining any SI or HI, then had to leave for an appointment.     Status After Intervention:  Unchanged    Participation Level: Minimal    Participation Quality: Attentive      Speech:  normal      Thought Process/Content: Logical      Affective Functioning: Congruent      Mood: euthymic      Level of consciousness:  Attentive      Response to Learning: Able to retain information, Capable of insight, and Progressing to goal      Endings: None Reported    Modes of Intervention: Support, Socialization, and Exploration      Discipline Responsible: /Counselor      Signature:  DEBORA Harper

## 2024-02-29 NOTE — GROUP NOTE
Group Therapy Note    Date: 2/29/2024    Group Start Time:  9:40 AM  Group End Time: 10:30 AM  Group Topic: Process Group - Outpatient    [facility]    Magdalena Gastelum MSW        Group Therapy Note    Attendees: 6    Writer facilitated process group this morning. When writer entered, patients were already processing, so writer encouraged them to continue and facilitated discussion around wellness, treatment, and events of the past few days.        Patient's Goal:  Participate in group therapy, engage in open sharing and processing with peers.     Notes:  Patient presented as quiet and preoccupied during group. She did not share much about herself, but did express that she is feeling tired today. Patient will continue with identified treatment goals in further groups.     Status After Intervention:  Unchanged    Participation Level: Minimal    Participation Quality: Appropriate and Attentive      Speech:  normal      Thought Process/Content: Logical      Affective Functioning: Congruent      Mood: depressed      Level of consciousness:  Alert and Oriented x4      Response to Learning: Able to retain information, Capable of insight, and Progressing to goal      Endings: None Reported    Modes of Intervention: Support, Socialization, and Exploration      Discipline Responsible: /Counselor      Signature:  DEBORA Harper

## 2024-03-01 ENCOUNTER — HOSPITAL ENCOUNTER (OUTPATIENT)
Facility: HOSPITAL | Age: 29
Setting detail: RECURRING SERIES
Discharge: HOME | End: 2024-03-04

## 2024-03-01 VITALS
SYSTOLIC BLOOD PRESSURE: 101 MMHG | TEMPERATURE: 97.7 F | HEART RATE: 78 BPM | OXYGEN SATURATION: 98 % | DIASTOLIC BLOOD PRESSURE: 69 MMHG

## 2024-03-01 PROCEDURE — 90853 GROUP PSYCHOTHERAPY: CPT

## 2024-03-01 NOTE — GROUP NOTE
Group Therapy Note    Date: 3/1/2024    Group Start Time: 10:40 AM  Group End Time: 11:30 AM  Group Topic: Cognitive Skills    [facility] Heather Lorenzo        Group Therapy Note  This writer facilitated a cognitive skills group on ways to increase positive experiences. This writer discussed short term and long term ways to build positive experiences and ways to be mindful of worries. This writer then encouraged patients to create a plan to stay out of the emotion mind. Patients reflected and shared on their short term goals for staying out of their emotion mind. This writer used open ended questions to prompt further discussion.     Attendees: 5     Patient's Goal:  To discuss steps to increase positive experiences and identify short term goals.    Notes:  The patient presented well to the session. The patient expressed her understanding of how to build positive experiences which included doing positive things. She further elaborated that creating a 30 day challenge could be helpful. The patient then listened attentively as this writer explained additional ways to build positive experiences. The patient will continue to work on identifying specific and realistic short term goals in further groups.    Status After Intervention:  Unchanged    Participation Level: Active Listener and Interactive    Participation Quality: Appropriate, Attentive, and Sharing      Speech:  normal      Thought Process/Content: Logical  Linear      Affective Functioning: Congruent      Mood: euthymic      Level of consciousness:  Alert, Oriented x4, and Attentive      Response to Learning: Able to verbalize current knowledge/experience, Able to verbalize/acknowledge new learning, Able to retain information, and Capable of insight      Endings: None Reported    Modes of Intervention: Education, Support, Socialization, Exploration, and

## 2024-03-01 NOTE — GROUP NOTE
Group Therapy Note    Date: 3/1/2024    Group Start Time:  1:10 PM  Group End Time:  2:00 PM  Group Topic: Psychoeducation    [facility]    Heather Landrum        Group Therapy Note    This writer facilitated a psychoeducational group that educated patients on psychotherapy. This writer provided education by informing patient of the approaches to psychotherapy and psychotherapists. This writer used open ended questions to prompt further discussion on patients' experiences with psychotherapy.    Attendees: 6       Patient's Goal:  To learn what psychotherapy is    Notes:  The patient presented well to the session. The patient listened attentively as this writer discussed the types of psychotherapy and the providers of psychotherapy. The patient was prompted to reflect on her experience with psychotherapy. The patient engaged in discussion with her peers regarding psychiatrists and facilities. The patient will continue to work on identifying and understanding what psychotherapy is in further groups.    Status After Intervention:  Unchanged    Participation Level: Active Listener and Interactive    Participation Quality: Appropriate, Attentive, and Sharing      Speech:  normal      Thought Process/Content: Logical  Linear      Affective Functioning: Congruent      Mood: euthymic      Level of consciousness:  Alert, Oriented x4, and Attentive      Response to Learning: Able to verbalize current knowledge/experience, Able to verbalize/acknowledge new learning, Able to retain information, and Capable of insight      Endings: None Reported    Modes of Intervention: Education, Support, Socialization, Exploration, and Clarifying      Discipline Responsible: /Counselor      Signature:  DEBORA Adams Student.

## 2024-03-01 NOTE — GROUP NOTE
Group Therapy Note    Date: 3/1/2024    Group Start Time:  9:40 AM  Group End Time: 10:30 AM  Group Topic: Process Group - Outpatient    [facility]    Magdalena Gastelum MSW        Group Therapy Note    Attendees: 6    Writer facilitated process group this morning. When writer entered the room, patients were already having a discussion. Writer encouraged patients to continue and facilitated discussion centered around self talk and self care.        Patient's Goal:  Participate in group therapy, engage in open sharing, processing, and discussion with peers.     Notes:  Patient engaged fairly in group this morning. She shared about her experience with feeling like she is self centered, but sometimes that is a good thing. She shared that most people think that it's a bad thing to be selfish/self centered, but that sometimes one has to put themselves first. Patient presented as receptive to feedback from writer and peers. Patient will continue with identified treatment goals in further groups.     Status After Intervention:  Unchanged    Participation Level: Active Listener and Interactive    Participation Quality: Appropriate, Attentive, and Sharing      Speech:  normal      Thought Process/Content: Logical      Affective Functioning: Congruent      Mood: euthymic      Level of consciousness:  Alert and Oriented x4      Response to Learning: Able to verbalize current knowledge/experience, Capable of insight, and Progressing to goal      Endings: None Reported    Modes of Intervention: Support, Socialization, and Exploration      Discipline Responsible: /Counselor      Signature:  DEBORA Harper

## 2024-03-01 NOTE — GROUP NOTE
Group Therapy Note    Date: 3/1/2024    Group Start Time:  9:00 AM  Group End Time:  9:40 AM  Group Topic: Community Meeting    [facility] Heather Lorenzo        Group Therapy Note  This writer facilitated a community group that encouraged patients to check in with how their weekend went and how they were feeling today. Patients were asked to complete the check-in form. After allowing time for this, this writer had patients reflect on things that make life enjoyable, describe a recent time of frustration, and identify the silver lining in the situation. This writer used open ended questions to prompt further discussion and reflective statements.     Attendees: 9       Patient's Goal:  To complete check-in form and identify the silver linings in frustrating situations.    Notes:  The patient presented well to the session. The patient completed the check in form and did not identify any SI or self-harm. The patient was then prompted to identify things that made life enjoyable for her. She shared that attending school made life enjoyable. The patient attentively listened to her peers share their thoughts as well. The patient will continue to identify the silver linings in further groups.     Status After Intervention:  Unchanged    Participation Level: Active Listener and Interactive    Participation Quality: Appropriate, Attentive, and Sharing      Speech:  normal      Thought Process/Content: Logical  Linear      Affective Functioning: Congruent      Mood: euthymic      Level of consciousness:  Alert, Oriented x4, and Attentive      Response to Learning: Able to verbalize current knowledge/experience, Able to retain information, and Capable of insight      Endings: None Reported    Modes of Intervention: Support, Socialization, Exploration, and Clarifying      Discipline Responsible: /Counselor      Signature:  Heather

## 2024-03-01 NOTE — GROUP NOTE
Group Therapy Note    Date: 3/1/2024    Group Start Time:  2:00 PM  Group End Time:  2:45 PM  Group Topic: Wrap-Up    [facility]    Magdalena Gastelum MSW; Lulú Decker MSW        Group Therapy Note    Attendees: 6    Writer co-facilitated group with DEBORA Mack due to needing to be available for crisis planning at the end of group.     Writer opened wrap up group by encouraging patients to fill out their wrap up sheets. Writer also provided patients with their safety plans, encouraging them to make any changes as needed. Writer then provided patients with an afternoon agenda and asked them to plan out the rest of their evening (and weekend if they wanted) and to consciously include self care. Writer then asked patients to share.        Patient's Goal:  Participate in group therapy, complete wrap up sheet, identify items of self care for the weekend.     Notes:  Patient engaged fairly in group this afternoon. She filled out her wrap up sheet, declining any SI or HI. Patient presented as quiet but attentive during group. She will continue with identified treatment goals in further groups.     Status After Intervention:  Unchanged    Participation Level: Active Listener and Interactive    Participation Quality: Appropriate and Attentive      Speech:  normal      Thought Process/Content: Logical      Affective Functioning: Congruent      Mood: euthymic      Level of consciousness:  Alert and Oriented x4      Response to Learning: Able to verbalize current knowledge/experience, Capable of insight, and Progressing to goal      Endings: None Reported    Modes of Intervention: Support, Socialization, and Exploration      Discipline Responsible: /Counselor      Signature:  DEBORA Harper

## 2024-03-01 NOTE — GROUP NOTE
Group Therapy Note    Date: 3/1/2024    Group Start Time: 12:00 PM  Group End Time:  1:00 PM  Group Topic: Psychotherapy    [facility]    Magdalena Gastelum MSW        Group Therapy Note    Attendees: 6    Writer facilitated psychotherapy group centered around healthy conflict. Writer opened group by asking patients if they had ever experienced conflict before, eliciting a laugh. Writer provided patients with the Fair Fighting Rules sheet along with a sheet on the DBT skill GIVE. Writer asked patients to take turns reading aloud about the Fair Fighting Rules and asked patients to come up with examples for each of the GIVE skills (gentle, interested, validate, and easy manner).        Patient's Goal:  Participate in group therapy, deepen understanding of fair fighting rules, increase comfort level with healthy conflict during and outside of treatment.     Notes:  Patient engaged minimally in group this afternoon. She did read during her turn to read for the group, and presented as overall quiet but attentive as evidenced by intermittent eye contact and nodding. Patient will continue with identified treatment goals in further groups.    Status After Intervention:  Unchanged    Participation Level: Active Listener and Interactive    Participation Quality: Appropriate and Attentive      Speech:  normal      Thought Process/Content: Logical      Affective Functioning: Congruent      Mood: depressed      Level of consciousness:  Alert and Oriented x4      Response to Learning: Able to verbalize current knowledge/experience, Capable of insight, and Progressing to goal      Endings: None Reported    Modes of Intervention: Education, Exploration, and Clarifying      Discipline Responsible: /Counselor      Signature:  DEBORA Harper

## 2024-03-02 NOTE — BH NOTE
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    Criteria for Continued Treatment (check all that apply):   [x] Preventing Decompensation   [x] Improving Level of Functioning  [] Reducing Isolative Behaviors  [] Understanding Diagnosis and Need for Medications  [] Improving Treatment/Medication Compliance  [] Confronting Denial of Illness  [] Stabilizing Level of Functioning  [] Improving Emotional/Social/Cognitive Functioning  [] Decreasing Frequency of Hospitalizations    Suicidal/Homicidal ideations:   [x] Absent  [] Present  [] Passive  [] Intent  [] Plan  [] Death Wishes      CURRENT CONDITION OF PATIENT & PROGRESS ON TREATMENT PLAN    Subjective (ongoing complaints by patient regarding symptom severity, presentation, affect, function, etc.):  Yen Edgar reports getting her Risperdal shot at her [facility] pharmacy.  Notes feeling alright and having a fair week.  Vaped during groups and it was removed.  She upset a few patients with this behavior however it was processed in groups.  Appropriately interactive and aware. Tolerating medications well.  Appetite is good.  Eating and sleeping fairly.  Feeling too comfortable in group setting.  Is redirectable.      Behavior (side effects, changes in mental status, objective observations seen in individual sessions or by staff): Yen Edgar is calm cooperative, clear and coherent with speech of average rate volume and tone.  Moods are fair.  Affect is fair range. Appropriately dressed and groomed.  Denies SI/HI/AH/VH.  No aggression or violence.  Appropriately interactive and aware.  Insight is fair and judgement is fair.        Assessment/Plan (functional improvement and/or ongoing impairment, response to treatment, plan for future ongoing treatment and medication management to include revisions): Continue current care  Groups, milieu and individual therapy  Medication modification

## 2024-03-04 ENCOUNTER — HOSPITAL ENCOUNTER (OUTPATIENT)
Facility: HOSPITAL | Age: 29
Setting detail: RECURRING SERIES
Discharge: HOME | End: 2024-03-07

## 2024-03-04 VITALS
HEART RATE: 68 BPM | TEMPERATURE: 98.1 F | DIASTOLIC BLOOD PRESSURE: 76 MMHG | OXYGEN SATURATION: 100 % | SYSTOLIC BLOOD PRESSURE: 105 MMHG

## 2024-03-04 PROCEDURE — 90832 PSYTX W PT 30 MINUTES: CPT

## 2024-03-04 PROCEDURE — 90853 GROUP PSYCHOTHERAPY: CPT

## 2024-03-04 NOTE — GROUP NOTE
Group Therapy Note    Date: 3/4/2024    Group Start Time:  1:10 PM  Group End Time:  2:00 PM  Group Topic: Psychoeducation    [facility]    Magdalena Gastelum MSW        Group Therapy Note    Attendees: 9    Writer facilitated psychoeducation group centered around the significance of exercise to one's mental health. Writer provided patients with an information sheet, which writer went over with them. Writer noted for patients that the information sheet also had a sample exercise plan, which they could use if they wanted. Writer then showed a short [employer] Talk by a , Kingston Burt, which reframed exercise as basic self care. Following this, writer encouraged discussion and moved into a basic yoga practice, which a patient offered to lead.        Patient's Goal:  Participate in group therapy, deepen understanding of how exercise is important to mental health and wellness.     Notes:  Patient was pulled from this group for an individual session.     Status After Intervention:  Unchanged    Participation Level: Minimal    Participation Quality: Appropriate and Attentive      Speech:  normal      Thought Process/Content: Logical      Affective Functioning: Congruent      Mood: euthymic      Level of consciousness:  Alert and Oriented x4      Response to Learning: Able to retain information and Capable of insight      Endings: None Reported    Modes of Intervention: Education, Exploration, Activity, and Movement      Discipline Responsible: /Counselor      Signature:  DEBORA Harper

## 2024-03-04 NOTE — BH NOTE
Discharge     This writer met with the patient to discuss vaping during the group. The patient stated that she did vape during group and stated that she saw a peer vape and because they were outside did not think it was a big deal.  The patient was informed that due to this rule break being the second instance of the patient vaping during group time on hospital property she would be discharged from the program.     The patient acknowledged that she would be discharged and coordinated transportation from the facility.    The patinet has an outpatient psychiatric appointment on 3/25/2024 as well as intake with [facility]    This decisions was made following consultation with peers, Dr. Holger Victoria was informed of the decision.    Jules Lozoya LCSW.

## 2024-03-04 NOTE — GROUP NOTE
Group Therapy Note    Date: 3/4/2024    Group Start Time: 12:00 PM  Group End Time:  1:00 PM  Group Topic: Process Group - Outpatient    [facility]    Jules Lozoya LCSW        Group Therapy Note    This writer facilitated a relaxation and gratitude group encouraging the patients to practice a journaling exercise to express gratitude. The patients were encouraged to share journal entries with peers.     Attendees: 10       Patient's Goal: practice gratitude journaling    Notes:  The patient participated in the journaling exercise. The patient shared journal entry with peers. The patient was observed vaping by peers. The patient confirmed that she was vaping during the group. The patient will continue participate in gratitude journaling exercise.     Status After Intervention:  Unchanged    Participation Level: Active Listener and Interactive    Participation Quality: Inappropriate      Speech:  normal      Thought Process/Content: Logical      Affective Functioning: Congruent      Mood: irritable      Level of consciousness:  Alert, Oriented x4, and Attentive      Response to Learning: Able to verbalize current knowledge/experience and Resistant      Endings: None Reported    Modes of Intervention: Activity      Discipline Responsible: /Counselor      Signature:  Jules Lozoya LCSW

## 2024-03-04 NOTE — GROUP NOTE
Group Therapy Note    Date: 3/4/2024    Group Start Time:  9:40 AM  Group End Time: 10:30 AM  Group Topic: Process Group - Outpatient    [facility]    Magdalena Gastelum MSW        Group Therapy Note    Attendees: 9    Writer facilitated process group this morning. Writer opened by asking patients if they had anything to process, and facilitated resulting discussions. Once everyone shared who wanted to, writer moved into a short gratitude activity where patients identified gratitude for their families, relationships, and other parts of their lives. Writer then asked patients to share and facilitated discussion about why it is important to take time to recognize gratitude in our lives.        Patient's Goal:  Participate in group therapy, engage in open sharing and processing with peers, engage in gratitude practice.     Notes:  Patient presented as distracted during group this morning. When writer asked if patients had been able to get out and enjoy the good weather this weekend, patient replied that she had gone to the car wash. When writer prompted patient to share her gratitude activity, she declined. Patient will continue with identified treatment goals in further groups.     Status After Intervention:  Unchanged    Participation Level: Active Listener and Minimal    Participation Quality: Appropriate      Speech:  normal      Thought Process/Content: Logical      Affective Functioning: Congruent      Mood: euthymic      Level of consciousness:  Alert, Oriented x4, and Preoccupied      Response to Learning: Able to verbalize current knowledge/experience      Endings: None Reported    Modes of Intervention: Support, Socialization, and Exploration      Discipline Responsible: /Counselor      Signature:  DEBORA Harper

## 2024-03-04 NOTE — GROUP NOTE
Group Therapy Note    Date: 3/4/2024    Group Start Time:  9:00 AM  Group End Time:  9:40 AM  Group Topic: Community Meeting    [facility]    Jules Lozoya LCSW        Group Therapy Note      This writer facilitated the morning community group. The patients were encouraged to identify mood and any safety concerns on the morning assessments. The patients were encouraged to discuss and disclose any events from the weekend. The patients were encouraged to participate in an affirmation exercise as well as a love and kindness meditation exercise to end the group.     Attendees: 10       Patient's Goal:  Identify mood, participate in discussion    Notes:  The patient completed the morning assessment and denied SI/HI. The patient participated in discussion of weekend with peers. The patient declined to participate in affirmation exercise. The patient will continue to identify mood and self-disclose during the community group.     Status After Intervention:  Unchanged    Participation Level: Active Listener and Interactive    Participation Quality: Appropriate, Attentive, Sharing, and Supportive      Speech:  normal      Thought Process/Content: Logical      Affective Functioning: Congruent      Mood: euthymic      Level of consciousness:  Alert, Oriented x4, and Inattentive      Response to Learning: Able to verbalize current knowledge/experience, Capable of insight, and Progressing to goal      Endings: None Reported    Modes of Intervention: Socialization and Exploration      Discipline Responsible: /Counselor      Signature:  Jules Lozoya LCSW
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
## 2024-03-04 NOTE — BH NOTE
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       Psychotherapy Session    Start time: 10:40  Stop time: 11: 15        Patient Mental Status and Mood/Affect:Flat    Patient Behavior and Appearance: Evasiveshows no evidence of impairment    Intervention/Techniques: Informed and Other: Review treatment plan, discharge plan, scheduled outpatient psychiatric appointment.     Focus of Session/Patient Response and Progress Towards Goal: The patient participated in treatment plan review and update. The patient confirmed discharge date and continues to be open to a family session. The patient and this writer worked to scheduled outpatient psych appointment as part of discharge planning.     Narrative: The patient denied SI/HI or AH/VH. The patient was resistant to individual initially stating that it was because its a \"Monday\".  The patient denied AH/HV and stated she could not remember the last time she heard voices or felt paranoid. The patient noted some fear when she is alone because she does not want to experience the voices or paranoia again.     The patient was initially resistant to this writers interventions giving one word answers and questioning treatment recommendations and necessity in a challenging way.  The patient eventually softened and because giving longer answers and self-disclosing about paranoia and hearing voices in the past.      The patient and this writer discussed outpatient treatment and the patient declined additional group therapy. The patient is open to individual therapy and helped this writer schedule an outpatient appointment with NP Mirna at [facility] for March 25 at 12 pm. This writer and patient contacted the patient's mother to schedule a family meeting for the following week.     The patient and this

## 2024-03-04 NOTE — PROGRESS NOTES
Discharge note    The patient and this writer reviewed goals and progress towards those goals. The patient participated in group, but required prompting to participate as well as prompting to not use phone during the group. The patient did not meet goal of participation due to administrative discharge for vaping during group time. The patient did not accomplish goals for individual therapy, identifying and practicing coping strategies or identify mood and any safety concerns due to administrative discharge.      The patient has outpatient appointments scheduled with [facility] for case management on 3/13 and outpatient psychiatric care on 3/25 with [facility].     Jules Lozoya. [facility]

## 2024-03-04 NOTE — GROUP NOTE
Group Therapy Note    Date: 3/4/2024    Group Start Time: 10:41 AM  Group End Time: 11:30 AM  Group Topic: Cognitive Skills    [facility]    Katy Patel MSW        Group Therapy Note    Writer facilitated cognitive skills group focused on challenging self-doubt. Writer utilized a worksheet for identifying a goal and barriers to that goal. Writer encouraged pts to complete the worksheet then share their responses with peers. Writer encouraged peers to provide practical and thought challenging feedback as well as provided education on skills to address barriers identified.    Attendees: 9       Patient's Goal:  na    Notes:  Pt was pulled for an individual and was not present for group.    Status After Intervention:  na    Participation Level: None    Participation Quality: na      Speech:  na      Thought Process/Content: na      Affective Functioning: na      Mood: na      Level of consciousness:  na      Response to Learning: na      Endings: None reported    Modes of Intervention: na      Discipline Responsible: /Counselor      Signature:  DEBORA HUNT

## 2024-03-04 NOTE — BH NOTE
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    Criteria for Continued Treatment (check all that apply):   [x] Preventing Decompensation   [] Improving Level of Functioning  [] Reducing Isolative Behaviors  [] Understanding Diagnosis and Need for Medications  [] Improving Treatment/Medication Compliance  [] Confronting Denial of Illness  [] Stabilizing Level of Functioning  [x] Improving Emotional/Social/Cognitive Functioning  [] Decreasing Frequency of Hospitalizations    Suicidal/Homicidal ideations:   [x] Absent  [] Present  [] Passive  [] Intent  [] Plan  [] Death Wishes      CURRENT CONDITION OF PATIENT & PROGRESS ON TREATMENT PLAN    Subjective (ongoing complaints by patient regarding symptom severity, presentation, affect, function, etc.):  Yen Edgar reports getting her Risperdal shot at her [facility] pharmacy.  Notes feeling alright and having a fair week.  Vaped during groups and it was removed.  She feels that her head is clearing up and discharged to her own recognizance.  Appropriately interactive and aware. Tolerating medications well.  Appetite is good.  Eating and sleeping fairly.  Discharging to out patient care.    Behavior (side effects, changes in mental status, objective observations seen in individual sessions or by staff): Yen Edgar is calm cooperative, clear and coherent with speech of average rate volume and tone.  Moods are fair.  Affect is fair range. Appropriately dressed and groomed.  Denies SI/HI/AH/VH.  No aggression or violence.  Appropriately interactive and aware.  Insight is fair and judgement is fair.        Assessment/Plan (functional improvement and/or ongoing impairment, response to treatment, plan for future ongoing treatment and medication management to include revisions): Continue current care  Groups, milieu and individual therapy  Medication modification as appropriate  Disposition

## 2024-03-05 ENCOUNTER — APPOINTMENT (OUTPATIENT)
Facility: HOSPITAL | Age: 29
End: 2024-03-05

## 2024-03-06 ENCOUNTER — APPOINTMENT (OUTPATIENT)
Facility: HOSPITAL | Age: 29
End: 2024-03-06

## 2024-03-07 ENCOUNTER — APPOINTMENT (OUTPATIENT)
Facility: HOSPITAL | Age: 29
End: 2024-03-07

## 2024-03-08 ENCOUNTER — APPOINTMENT (OUTPATIENT)
Facility: HOSPITAL | Age: 29
End: 2024-03-08

## 2024-03-11 ENCOUNTER — APPOINTMENT (OUTPATIENT)
Facility: HOSPITAL | Age: 29
End: 2024-03-11

## 2024-03-12 ENCOUNTER — APPOINTMENT (OUTPATIENT)
Facility: HOSPITAL | Age: 29
End: 2024-03-12

## 2024-03-13 ENCOUNTER — APPOINTMENT (OUTPATIENT)
Facility: HOSPITAL | Age: 29
End: 2024-03-13

## 2024-03-14 ENCOUNTER — APPOINTMENT (OUTPATIENT)
Facility: HOSPITAL | Age: 29
End: 2024-03-14

## 2024-03-15 ENCOUNTER — APPOINTMENT (OUTPATIENT)
Facility: HOSPITAL | Age: 29
End: 2024-03-15

## 2024-03-18 ENCOUNTER — APPOINTMENT (OUTPATIENT)
Facility: HOSPITAL | Age: 29
End: 2024-03-18

## 2024-03-19 ENCOUNTER — APPOINTMENT (OUTPATIENT)
Facility: HOSPITAL | Age: 29
End: 2024-03-19

## 2024-03-20 ENCOUNTER — APPOINTMENT (OUTPATIENT)
Facility: HOSPITAL | Age: 29
End: 2024-03-20

## 2024-03-21 ENCOUNTER — APPOINTMENT (OUTPATIENT)
Facility: HOSPITAL | Age: 29
End: 2024-03-21

## 2024-03-22 ENCOUNTER — APPOINTMENT (OUTPATIENT)
Facility: HOSPITAL | Age: 29
End: 2024-03-22

## 2024-03-25 ENCOUNTER — APPOINTMENT (OUTPATIENT)
Facility: HOSPITAL | Age: 29
End: 2024-03-25

## 2024-03-26 ENCOUNTER — APPOINTMENT (OUTPATIENT)
Facility: HOSPITAL | Age: 29
End: 2024-03-26

## 2024-03-27 ENCOUNTER — APPOINTMENT (OUTPATIENT)
Facility: HOSPITAL | Age: 29
End: 2024-03-27

## 2024-03-28 ENCOUNTER — APPOINTMENT (OUTPATIENT)
Facility: HOSPITAL | Age: 29
End: 2024-03-28

## 2024-03-29 ENCOUNTER — APPOINTMENT (OUTPATIENT)
Facility: HOSPITAL | Age: 29
End: 2024-03-29

## 2024-07-20 ENCOUNTER — HOSPITAL ENCOUNTER (EMERGENCY)
Facility: HOSPITAL | Age: 29
Discharge: HOME | End: 2024-07-20
Attending: STUDENT IN AN ORGANIZED HEALTH CARE EDUCATION/TRAINING PROGRAM
Payer: COMMERCIAL

## 2024-07-20 VITALS
RESPIRATION RATE: 18 BRPM | HEIGHT: 62 IN | DIASTOLIC BLOOD PRESSURE: 74 MMHG | WEIGHT: 108.03 LBS | BODY MASS INDEX: 19.88 KG/M2 | OXYGEN SATURATION: 100 % | TEMPERATURE: 97.8 F | SYSTOLIC BLOOD PRESSURE: 106 MMHG | HEART RATE: 69 BPM

## 2024-07-20 DIAGNOSIS — R68.84 JAW PAIN: Primary | ICD-10-CM

## 2024-07-20 LAB — HCG UR QL: NEGATIVE

## 2024-07-20 PROCEDURE — 81025 URINE PREGNANCY TEST: CPT

## 2024-07-20 PROCEDURE — 99282 EMERGENCY DEPT VISIT SF MDM: CPT

## 2024-07-20 ASSESSMENT — PAIN - FUNCTIONAL ASSESSMENT
PAIN_FUNCTIONAL_ASSESSMENT: 0-10
PAIN_FUNCTIONAL_ASSESSMENT: PREVENTS OR INTERFERES SOME ACTIVE ACTIVITIES AND ADLS

## 2024-07-20 ASSESSMENT — PAIN SCALES - GENERAL: PAINLEVEL_OUTOF10: 7

## 2024-07-20 ASSESSMENT — PAIN DESCRIPTION - PAIN TYPE: TYPE: ACUTE PAIN

## 2024-07-20 ASSESSMENT — PAIN DESCRIPTION - ONSET: ONSET: ON-GOING

## 2024-07-20 ASSESSMENT — PAIN DESCRIPTION - DESCRIPTORS: DESCRIPTORS: ACHING

## 2024-07-20 ASSESSMENT — PAIN DESCRIPTION - ORIENTATION: ORIENTATION: RIGHT

## 2024-07-20 ASSESSMENT — PAIN DESCRIPTION - LOCATION: LOCATION: JAW;HEAD

## 2024-07-20 NOTE — DISCHARGE INSTRUCTIONS
Return for new or worsening symptoms.  It may be helpful to try a nightguard to help with your jaw pain.

## 2024-07-20 NOTE — ED PROVIDER NOTES
[facility] EMERGENCY DEP  EMERGENCY DEPARTMENT ENCOUNTER      Pt Name: Yen Edgar  MRN: [id number]  Birthdate 1995  Date of evaluation: 7/20/2024  Provider: RONNELL Harkins    CHIEF COMPLAINT       Chief Complaint   Patient presents with    Jaw Pain    Headache         HISTORY OF PRESENT ILLNESS   (Location/Symptom, Timing/Onset, Context/Setting, Quality, Duration, Modifying Factors, Severity)  Note limiting factors.   29 year old F presenting for jaw pain.  \"Two years ago I fell and bumped by chin and had to get stitches.\"  Notes pain in the right jaw and a mild frontal headache.   X about 3-4 weeks.  Intermitent.  No pain with eating.  No swelling.  No fever.  No drainage inside the mouth.  No dental pain or swelling.  Pt notes that headache is somewhat new.  Vision normal.  No vomiting.  No recent trauma.  No recent URI symptoms.  + chance of pregnancy.  Has not taken a test.  Notes mental health has been good - denies wanting to speak with a counselor, denies SI or HI.    PMHx: UTD per patient  Social: non-smoker.  No alcohol.  Occasional marijuana.  Works at [employer] and does some \"creativity work\"  Allergies UTD      The history is provided by the patient.         Review of External Medical Records:     Nursing Notes were reviewed.    REVIEW OF SYSTEMS    (2-9 systems for level 4, 10 or more for level 5)     Review of Systems   Neurological:  Positive for headaches.       Except as noted above the remainder of the review of systems was reviewed and negative.       PAST MEDICAL HISTORY     Past Medical History:   Diagnosis Date    Asthma     Second hand smoke exposure     Seizure (HCC)     mother unsure what kind of seizure, last one was in 1st grade         SURGICAL HISTORY     No past surgical history on file.      CURRENT MEDICATIONS       Discharge Medication List as of 7/20/2024  4:48 PM        CONTINUE these medications which have NOT CHANGED    Details   risperiDONE ER (PERSERIS) 120 MG

## 2024-07-22 LAB — HCG UR QL: NEGATIVE

## 2024-08-03 ENCOUNTER — HOSPITAL ENCOUNTER (EMERGENCY)
Facility: HOSPITAL | Age: 29
Discharge: HOME | End: 2024-08-03
Attending: STUDENT IN AN ORGANIZED HEALTH CARE EDUCATION/TRAINING PROGRAM
Payer: COMMERCIAL

## 2024-08-03 VITALS
BODY MASS INDEX: 17.88 KG/M2 | RESPIRATION RATE: 20 BRPM | HEART RATE: 94 BPM | OXYGEN SATURATION: 100 % | TEMPERATURE: 98.4 F | SYSTOLIC BLOOD PRESSURE: 121 MMHG | HEIGHT: 64 IN | DIASTOLIC BLOOD PRESSURE: 79 MMHG | WEIGHT: 104.72 LBS

## 2024-08-03 DIAGNOSIS — M79.10 MYALGIA: Primary | ICD-10-CM

## 2024-08-03 DIAGNOSIS — L08.9 INFECTED SEBACEOUS CYST: ICD-10-CM

## 2024-08-03 DIAGNOSIS — F20.9 SCHIZOPHRENIA, UNSPECIFIED TYPE (HCC): ICD-10-CM

## 2024-08-03 DIAGNOSIS — L72.3 INFECTED SEBACEOUS CYST: ICD-10-CM

## 2024-08-03 LAB
ANION GAP SERPL CALC-SCNC: 7 MMOL/L (ref 5–15)
APPEARANCE UR: ABNORMAL
BACTERIA URNS QL MICRO: ABNORMAL /HPF
BASOPHILS # BLD: 0 K/UL (ref 0–0.1)
BASOPHILS NFR BLD: 1 % (ref 0–1)
BILIRUB UR QL: NEGATIVE
BUN SERPL-MCNC: 6 MG/DL (ref 6–20)
BUN/CREAT SERPL: 8 (ref 12–20)
CALCIUM SERPL-MCNC: 9.8 MG/DL (ref 8.5–10.1)
CHLORIDE SERPL-SCNC: 105 MMOL/L (ref 97–108)
CK SERPL-CCNC: 59 U/L (ref 26–192)
CO2 SERPL-SCNC: 28 MMOL/L (ref 21–32)
COLOR UR: ABNORMAL
COMMENT:: NORMAL
CREAT SERPL-MCNC: 0.76 MG/DL (ref 0.55–1.02)
DIFFERENTIAL METHOD BLD: ABNORMAL
EOSINOPHIL # BLD: 0 K/UL (ref 0–0.4)
EOSINOPHIL NFR BLD: 1 % (ref 0–7)
EPITH CASTS URNS QL MICRO: ABNORMAL /LPF
ERYTHROCYTE [DISTWIDTH] IN BLOOD BY AUTOMATED COUNT: 12.6 % (ref 11.5–14.5)
GLUCOSE SERPL-MCNC: 97 MG/DL (ref 65–100)
GLUCOSE UR STRIP.AUTO-MCNC: NEGATIVE MG/DL
HCG UR QL: NEGATIVE
HCT VFR BLD AUTO: 38.1 % (ref 35–47)
HGB BLD-MCNC: 12.9 G/DL (ref 11.5–16)
HGB UR QL STRIP: NEGATIVE
IMM GRANULOCYTES # BLD AUTO: 0 K/UL (ref 0–0.04)
IMM GRANULOCYTES NFR BLD AUTO: 0 % (ref 0–0.5)
KETONES UR QL STRIP.AUTO: 40 MG/DL
LEUKOCYTE ESTERASE UR QL STRIP.AUTO: ABNORMAL
LYMPHOCYTES # BLD: 1.1 K/UL (ref 0.8–3.5)
LYMPHOCYTES NFR BLD: 34 % (ref 12–49)
MCH RBC QN AUTO: 29.5 PG (ref 26–34)
MCHC RBC AUTO-ENTMCNC: 33.9 G/DL (ref 30–36.5)
MCV RBC AUTO: 87 FL (ref 80–99)
MONOCYTES # BLD: 0.3 K/UL (ref 0–1)
MONOCYTES NFR BLD: 9 % (ref 5–13)
MUCOUS THREADS URNS QL MICRO: ABNORMAL /LPF
NEUTS SEG # BLD: 1.8 K/UL (ref 1.8–8)
NEUTS SEG NFR BLD: 55 % (ref 32–75)
NITRITE UR QL STRIP.AUTO: NEGATIVE
NRBC # BLD: 0 K/UL (ref 0–0.01)
NRBC BLD-RTO: 0 PER 100 WBC
PH UR STRIP: 6 (ref 5–8)
PLATELET # BLD AUTO: 199 K/UL (ref 150–400)
PMV BLD AUTO: 12 FL (ref 8.9–12.9)
POTASSIUM SERPL-SCNC: 3.5 MMOL/L (ref 3.5–5.1)
PROT UR STRIP-MCNC: 30 MG/DL
RBC # BLD AUTO: 4.38 M/UL (ref 3.8–5.2)
RBC #/AREA URNS HPF: ABNORMAL /HPF (ref 0–5)
SODIUM SERPL-SCNC: 140 MMOL/L (ref 136–145)
SP GR UR REFRACTOMETRY: 1.03 (ref 1–1.03)
SPECIMEN HOLD: NORMAL
SPECIMEN HOLD: NORMAL
UROBILINOGEN UR QL STRIP.AUTO: 2 EU/DL (ref 0.2–1)
WBC # BLD AUTO: 3.2 K/UL (ref 3.6–11)
WBC URNS QL MICRO: ABNORMAL /HPF (ref 0–4)

## 2024-08-03 PROCEDURE — 82550 ASSAY OF CK (CPK): CPT

## 2024-08-03 PROCEDURE — 81001 URINALYSIS AUTO W/SCOPE: CPT

## 2024-08-03 PROCEDURE — 99284 EMERGENCY DEPT VISIT MOD MDM: CPT

## 2024-08-03 PROCEDURE — 2500000003 HC RX 250 WO HCPCS: Performed by: PHYSICIAN ASSISTANT

## 2024-08-03 PROCEDURE — 81025 URINE PREGNANCY TEST: CPT

## 2024-08-03 PROCEDURE — 80048 BASIC METABOLIC PNL TOTAL CA: CPT

## 2024-08-03 PROCEDURE — 85025 COMPLETE CBC W/AUTO DIFF WBC: CPT

## 2024-08-03 PROCEDURE — 6360000002 HC RX W HCPCS: Performed by: PHYSICIAN ASSISTANT

## 2024-08-03 PROCEDURE — 10061 I&D ABSCESS COMP/MULTIPLE: CPT

## 2024-08-03 PROCEDURE — 96374 THER/PROPH/DIAG INJ IV PUSH: CPT

## 2024-08-03 PROCEDURE — 36415 COLL VENOUS BLD VENIPUNCTURE: CPT

## 2024-08-03 RX ORDER — KETOROLAC TROMETHAMINE 30 MG/ML
15 INJECTION, SOLUTION INTRAMUSCULAR; INTRAVENOUS
Status: COMPLETED | OUTPATIENT
Start: 2024-08-03 | End: 2024-08-03

## 2024-08-03 RX ORDER — LIDOCAINE HYDROCHLORIDE AND EPINEPHRINE 10; 10 MG/ML; UG/ML
20 INJECTION, SOLUTION INFILTRATION; PERINEURAL ONCE
Status: COMPLETED | OUTPATIENT
Start: 2024-08-03 | End: 2024-08-03

## 2024-08-03 RX ADMIN — LIDOCAINE HYDROCHLORIDE,EPINEPHRINE BITARTRATE 20 ML: 10; .01 INJECTION, SOLUTION INFILTRATION; PERINEURAL at 15:39

## 2024-08-03 RX ADMIN — KETOROLAC TROMETHAMINE 15 MG: 30 INJECTION, SOLUTION INTRAMUSCULAR at 15:37

## 2024-08-03 ASSESSMENT — PAIN DESCRIPTION - FREQUENCY: FREQUENCY: CONTINUOUS

## 2024-08-03 ASSESSMENT — PAIN SCALES - GENERAL: PAINLEVEL_OUTOF10: 6

## 2024-08-03 ASSESSMENT — PAIN - FUNCTIONAL ASSESSMENT
PAIN_FUNCTIONAL_ASSESSMENT: ACTIVITIES ARE NOT PREVENTED
PAIN_FUNCTIONAL_ASSESSMENT: 0-10

## 2024-08-03 ASSESSMENT — PAIN DESCRIPTION - PAIN TYPE: TYPE: ACUTE PAIN

## 2024-08-03 ASSESSMENT — PAIN DESCRIPTION - LOCATION: LOCATION: GENERALIZED

## 2024-08-03 ASSESSMENT — PAIN DESCRIPTION - ORIENTATION: ORIENTATION: RIGHT;LEFT

## 2024-08-03 ASSESSMENT — PAIN DESCRIPTION - DESCRIPTORS: DESCRIPTORS: ACHING

## 2024-08-03 ASSESSMENT — PAIN DESCRIPTION - ONSET: ONSET: ON-GOING

## 2024-08-03 NOTE — BSMART NOTE
BSMART assessment completed, and suicide risk level noted to be None. Primary/ Charge Nurse Raquel and Physician Assistant Sourav notified. Concerns not observed.

## 2024-08-03 NOTE — ED PROVIDER NOTES
[facility] EMERGENCY DEP  EMERGENCY DEPARTMENT ENCOUNTER      Pt Name: Yen Edgar  MRN: [id number]  Birthdate 1995  Date of evaluation: 8/3/2024  Provider: RONNELL Harkins    CHIEF COMPLAINT       Chief Complaint   Patient presents with    Generalized Body Aches         HISTORY OF PRESENT ILLNESS   (Location/Symptom, Timing/Onset, Context/Setting, Quality, Duration, Modifying Factors, Severity)  Note limiting factors.         29-year-old female presenting to the ED for multiple complaints.  Patient reports that she was diagnosed with a UTI about 5 weeks ago.  Was treated with an antibiotic.  Notes that at the time, she was having dysuria.  Notes that those symptoms have resolved.  States that \"I think it has spread to my arms and legs.\"  When asked why she thinks that the urine has caused her to have this pain, patient reports that she thinks the infection has spread.  \"My urine is fine.\"  Denies any redness, swelling, fever.  No vomiting.  No other concerns.    PMHx: List up-to-date per patient  Psx: List up-to-date per patient    The history is provided by the patient and medical records.         Review of External Medical Records:     Nursing Notes were reviewed.    REVIEW OF SYSTEMS    (2-9 systems for level 4, 10 or more for level 5)     Review of Systems   Constitutional:  Negative for fever.   Musculoskeletal:  Positive for myalgias.       Except as noted above the remainder of the review of systems was reviewed and negative.       PAST MEDICAL HISTORY     Past Medical History:   Diagnosis Date    Asthma     Second hand smoke exposure     Seizure (HCC)     mother unsure what kind of seizure, last one was in 1st grade         SURGICAL HISTORY     History reviewed. No pertinent surgical history.      CURRENT MEDICATIONS       Previous Medications    RISPERIDONE ER (PERSERIS) 120 MG PRSY SUBCUTANEOUS INJECTION    Inject 0.8 mLs into the skin every 28 days Next injection due on 02/28/24      Ultrasound guidance: no      Needle aspiration: no      Incision types:  Single straight    Incision depth:  Subcutaneous    Wound management:  Probed and deloculated    Drainage:  Purulent (sebaceous)    Drainage amount:  Scant    Wound treatment:  Wound left open  Post-procedure details:     Procedure completion:  Tolerated well, no immediate complications        FINAL IMPRESSION      1. Myalgia    2. Infected sebaceous cyst    3. Schizophrenia, unspecified type (HCC)          DISPOSITION/PLAN   DISPOSITION        PATIENT REFERRED TO:  [facility] EMERGENCY DEP  [address] [place] [id number] [phone]    If symptoms worsen      DISCHARGE MEDICATIONS:  New Prescriptions    No medications on file         (Please note that portions of this note were completed with a voice recognition program.  Efforts were made to edit the dictations but occasionally words are mis-transcribed.)    RONNELL Harkins (electronically signed)  Emergency Attending Physician / Physician Assistant / Nurse Practitioner      When I went to reassess patient, mother was at the bedside.  Mom voiced concerns over a possible infected cyst to the right chest wall, also voiced concerns that the patient is schizophrenic and has been off of her meds for 2 months.  Would like resources for patient to follow-up as an outpatient.  RONNELL Harkins Lindsay H, PA  08/03/24 [id number]

## 2024-08-03 NOTE — ED TRIAGE NOTES
Patient was seen 5 weeks and diagnosed with UTI and received antibiotics. Patient states having headache and still feeling bad. Denies urinary symptoms. Denies fever or redness

## 2024-08-03 NOTE — BSMART NOTE
Comprehensive Assessment Form Part 1      Section I - Disposition    Primary Diagnosis: Schizophrenia      The Medical Doctor to Psychiatrist conference was notcompleted.  The Medical Doctor is in agreement with Psychiatrist disposition because of (reason) patient doesn't warrant inpatient care.  The plan is d/c home with her mother and given referrals. Reminded patient of importance of proper self care.   The on-call Psychiatrist consulted was Dr. SAMANIEGO.  The admitting Psychiatrist will be Dr. SAMANIEGO.  The admitting Diagnosis is NA.  The Payor source is NA.    The C-SSRS is not indicative of any suicidal risk    Section II - Integrated Summary  Summary:  Patient and her mother are present in ER for medical reasons. During course in ER patient is noted to be labile in mood and her mother indicates that she's not taking her Rx. Patient is noted to be quite thin and mom states that patient has not been eating well \"she is being real picky and lately will only eat meat\". Mom indicates that patient has again been engaging in bizarre behavior at home(filling cups with bleach, hiding objects, filling bottles with other liquids). She has also been slightly paranoid with those around her. Mom states that she was being treated at [facility] but wasn't remembering appts that were telehealth and ultimately lost her Rx. She was on Risperdal ROLAND but mom is unsure of last dose. The patient had been working and staying with her grandmother, but as she has grown more bizarre in her behavior her grandmother stated she couldn't return until she resumed Rx. She is now staying with her mother. Patient initially wasn't answering questions and deferred to mom. She did state that she wasn't in need of  care but needed \"physical therapy for my whole body\". She states this is because her body has become weak. She lacks insight into her weight loss saying only that she used to weigh 120lb and now needs \"physical therapy to be healthy\".

## 2024-08-03 NOTE — ED NOTES
Discharge instructions given to patient by   PA and RN. Pt has been given counseling on medication use and verbalizes understanding. IV D/C. Pt ambulated off of unit in no signs of distress.

## 2024-09-30 ENCOUNTER — APPOINTMENT (OUTPATIENT)
Facility: HOSPITAL | Age: 29
End: 2024-09-30
Payer: COMMERCIAL

## 2024-09-30 ENCOUNTER — HOSPITAL ENCOUNTER (EMERGENCY)
Facility: HOSPITAL | Age: 29
Discharge: HOME | End: 2024-09-30
Attending: STUDENT IN AN ORGANIZED HEALTH CARE EDUCATION/TRAINING PROGRAM
Payer: COMMERCIAL

## 2024-09-30 VITALS
TEMPERATURE: 97.5 F | BODY MASS INDEX: 19.5 KG/M2 | RESPIRATION RATE: 16 BRPM | OXYGEN SATURATION: 96 % | HEART RATE: 73 BPM | HEIGHT: 64 IN | SYSTOLIC BLOOD PRESSURE: 112 MMHG | WEIGHT: 114.2 LBS | DIASTOLIC BLOOD PRESSURE: 75 MMHG

## 2024-09-30 DIAGNOSIS — M54.50 ACUTE BILATERAL LOW BACK PAIN WITHOUT SCIATICA: ICD-10-CM

## 2024-09-30 DIAGNOSIS — R10.13 ABDOMINAL PAIN, EPIGASTRIC: ICD-10-CM

## 2024-09-30 DIAGNOSIS — R51.9 NONINTRACTABLE EPISODIC HEADACHE, UNSPECIFIED HEADACHE TYPE: Primary | ICD-10-CM

## 2024-09-30 DIAGNOSIS — R19.7 DIARRHEA, UNSPECIFIED TYPE: ICD-10-CM

## 2024-09-30 LAB
ALBUMIN SERPL-MCNC: 4.6 G/DL (ref 3.5–5)
ALBUMIN/GLOB SERPL: 1.1 (ref 1.1–2.2)
ALP SERPL-CCNC: 78 U/L (ref 45–117)
ALT SERPL-CCNC: 13 U/L (ref 12–78)
ANION GAP SERPL CALC-SCNC: 3 MMOL/L (ref 2–12)
APPEARANCE UR: CLEAR
AST SERPL-CCNC: 21 U/L (ref 15–37)
BACTERIA URNS QL MICRO: NEGATIVE /HPF
BASOPHILS # BLD: 0.1 K/UL (ref 0–0.1)
BASOPHILS NFR BLD: 4 % (ref 0–1)
BILIRUB SERPL-MCNC: 0.8 MG/DL (ref 0.2–1)
BILIRUB UR QL: NEGATIVE
BUN SERPL-MCNC: 13 MG/DL (ref 6–20)
BUN/CREAT SERPL: 17 (ref 12–20)
CALCIUM SERPL-MCNC: 9.7 MG/DL (ref 8.5–10.1)
CHLORIDE SERPL-SCNC: 104 MMOL/L (ref 97–108)
CO2 SERPL-SCNC: 27 MMOL/L (ref 21–32)
COLOR UR: ABNORMAL
COMMENT:: NORMAL
CREAT SERPL-MCNC: 0.75 MG/DL (ref 0.55–1.02)
DIFFERENTIAL METHOD BLD: ABNORMAL
EKG ATRIAL RATE: 80 BPM
EKG DIAGNOSIS: NORMAL
EKG P AXIS: 85 DEGREES
EKG P-R INTERVAL: 152 MS
EKG Q-T INTERVAL: 376 MS
EKG QRS DURATION: 82 MS
EKG QTC CALCULATION (BAZETT): 433 MS
EKG R AXIS: 70 DEGREES
EKG T AXIS: 43 DEGREES
EKG VENTRICULAR RATE: 80 BPM
EOSINOPHIL # BLD: 0.1 K/UL (ref 0–0.4)
EOSINOPHIL NFR BLD: 3 % (ref 0–7)
EPITH CASTS URNS QL MICRO: ABNORMAL /LPF
ERYTHROCYTE [DISTWIDTH] IN BLOOD BY AUTOMATED COUNT: 13.2 % (ref 11.5–14.5)
GLOBULIN SER CALC-MCNC: 4.1 G/DL (ref 2–4)
GLUCOSE SERPL-MCNC: 71 MG/DL (ref 65–100)
GLUCOSE UR STRIP.AUTO-MCNC: NEGATIVE MG/DL
HCG UR QL: NEGATIVE
HCT VFR BLD AUTO: 38.6 % (ref 35–47)
HGB BLD-MCNC: 12.7 G/DL (ref 11.5–16)
HGB UR QL STRIP: NEGATIVE
HYALINE CASTS URNS QL MICRO: ABNORMAL /LPF (ref 0–5)
IMM GRANULOCYTES # BLD AUTO: 0 K/UL
IMM GRANULOCYTES NFR BLD AUTO: 0 %
KETONES UR QL STRIP.AUTO: ABNORMAL MG/DL
LEUKOCYTE ESTERASE UR QL STRIP.AUTO: NEGATIVE
LIPASE SERPL-CCNC: 25 U/L (ref 13–75)
LYMPHOCYTES # BLD: 1.9 K/UL (ref 0.8–3.5)
LYMPHOCYTES NFR BLD: 64 % (ref 12–49)
MAGNESIUM SERPL-MCNC: 1.8 MG/DL (ref 1.6–2.4)
MCH RBC QN AUTO: 29.3 PG (ref 26–34)
MCHC RBC AUTO-ENTMCNC: 32.9 G/DL (ref 30–36.5)
MCV RBC AUTO: 88.9 FL (ref 80–99)
MONOCYTES # BLD: 0.2 K/UL (ref 0–1)
MONOCYTES NFR BLD: 8 % (ref 5–13)
NEUTS SEG # BLD: 0.6 K/UL (ref 1.8–8)
NEUTS SEG NFR BLD: 21 % (ref 32–75)
NITRITE UR QL STRIP.AUTO: NEGATIVE
NRBC # BLD: 0 K/UL (ref 0–0.01)
NRBC BLD-RTO: 0 PER 100 WBC
PATH REV BLD -IMP: ABNORMAL
PH UR STRIP: 5.5 (ref 5–8)
PLATELET # BLD AUTO: 206 K/UL (ref 150–400)
PMV BLD AUTO: 11.7 FL (ref 8.9–12.9)
POTASSIUM SERPL-SCNC: 3.9 MMOL/L (ref 3.5–5.1)
PROT SERPL-MCNC: 8.7 G/DL (ref 6.4–8.2)
PROT UR STRIP-MCNC: NEGATIVE MG/DL
RBC # BLD AUTO: 4.34 M/UL (ref 3.8–5.2)
RBC #/AREA URNS HPF: ABNORMAL /HPF (ref 0–5)
RBC MORPH BLD: ABNORMAL
SODIUM SERPL-SCNC: 134 MMOL/L (ref 136–145)
SP GR UR REFRACTOMETRY: 1.02 (ref 1–1.03)
SPECIMEN HOLD: NORMAL
SPECIMEN HOLD: NORMAL
TROPONIN I SERPL HS-MCNC: <4 NG/L (ref 0–51)
UROBILINOGEN UR QL STRIP.AUTO: 0.2 EU/DL (ref 0.2–1)
WBC # BLD AUTO: 2.9 K/UL (ref 3.6–11)
WBC MORPH BLD: ABNORMAL
WBC URNS QL MICRO: ABNORMAL /HPF (ref 0–4)

## 2024-09-30 PROCEDURE — 99285 EMERGENCY DEPT VISIT HI MDM: CPT

## 2024-09-30 PROCEDURE — 81001 URINALYSIS AUTO W/SCOPE: CPT

## 2024-09-30 PROCEDURE — 83735 ASSAY OF MAGNESIUM: CPT

## 2024-09-30 PROCEDURE — 80053 COMPREHEN METABOLIC PANEL: CPT

## 2024-09-30 PROCEDURE — 96374 THER/PROPH/DIAG INJ IV PUSH: CPT

## 2024-09-30 PROCEDURE — 96361 HYDRATE IV INFUSION ADD-ON: CPT

## 2024-09-30 PROCEDURE — 85025 COMPLETE CBC W/AUTO DIFF WBC: CPT

## 2024-09-30 PROCEDURE — 6360000002 HC RX W HCPCS: Performed by: STUDENT IN AN ORGANIZED HEALTH CARE EDUCATION/TRAINING PROGRAM

## 2024-09-30 PROCEDURE — 36415 COLL VENOUS BLD VENIPUNCTURE: CPT

## 2024-09-30 PROCEDURE — 71046 X-RAY EXAM CHEST 2 VIEWS: CPT

## 2024-09-30 PROCEDURE — 81025 URINE PREGNANCY TEST: CPT

## 2024-09-30 PROCEDURE — 93005 ELECTROCARDIOGRAM TRACING: CPT | Performed by: STUDENT IN AN ORGANIZED HEALTH CARE EDUCATION/TRAINING PROGRAM

## 2024-09-30 PROCEDURE — 83690 ASSAY OF LIPASE: CPT

## 2024-09-30 PROCEDURE — 84484 ASSAY OF TROPONIN QUANT: CPT

## 2024-09-30 PROCEDURE — 2580000003 HC RX 258: Performed by: STUDENT IN AN ORGANIZED HEALTH CARE EDUCATION/TRAINING PROGRAM

## 2024-09-30 PROCEDURE — 76705 ECHO EXAM OF ABDOMEN: CPT

## 2024-09-30 RX ORDER — KETOROLAC TROMETHAMINE 30 MG/ML
30 INJECTION, SOLUTION INTRAMUSCULAR; INTRAVENOUS ONCE
Status: COMPLETED | OUTPATIENT
Start: 2024-09-30 | End: 2024-09-30

## 2024-09-30 RX ORDER — 0.9 % SODIUM CHLORIDE 0.9 %
1000 INTRAVENOUS SOLUTION INTRAVENOUS ONCE
Status: COMPLETED | OUTPATIENT
Start: 2024-09-30 | End: 2024-09-30

## 2024-09-30 RX ADMIN — SODIUM CHLORIDE 1000 ML: 9 INJECTION, SOLUTION INTRAVENOUS at 13:03

## 2024-09-30 RX ADMIN — KETOROLAC TROMETHAMINE 30 MG: 30 INJECTION, SOLUTION INTRAMUSCULAR at 12:59

## 2024-09-30 ASSESSMENT — ENCOUNTER SYMPTOMS
BACK PAIN: 1
ABDOMINAL PAIN: 1

## 2024-09-30 ASSESSMENT — PAIN DESCRIPTION - LOCATION: LOCATION: HEAD

## 2024-09-30 ASSESSMENT — PAIN - FUNCTIONAL ASSESSMENT: PAIN_FUNCTIONAL_ASSESSMENT: ACTIVITIES ARE NOT PREVENTED

## 2024-09-30 ASSESSMENT — PAIN SCALES - GENERAL: PAINLEVEL_OUTOF10: 3

## 2024-09-30 ASSESSMENT — PAIN DESCRIPTION - ORIENTATION: ORIENTATION: ANTERIOR

## 2024-09-30 ASSESSMENT — PAIN DESCRIPTION - DESCRIPTORS: DESCRIPTORS: ACHING

## 2024-09-30 ASSESSMENT — PAIN DESCRIPTION - ONSET: ONSET: ON-GOING

## 2024-09-30 ASSESSMENT — PAIN DESCRIPTION - PAIN TYPE: TYPE: ACUTE PAIN

## 2024-09-30 NOTE — ED PROVIDER NOTES
[facility] EMERGENCY DEP  EMERGENCY DEPARTMENT ENCOUNTER      Pt Name: Yen Edgar  MRN: [id number]  Birthdate 1995  Date of evaluation: 9/30/2024  Provider: Stalin Tee DO    CHIEF COMPLAINT       Chief Complaint   Patient presents with    Headache         HISTORY OF PRESENT ILLNESS   (Location/Symptom, Timing/Onset, Context/Setting, Quality, Duration, Modifying Factors, Severity)  Note limiting factors.   Patient is a 29-year-old female presenting today secondary to concerns for a \"GI bug\".  When asked to further describe she tells me that she has had intermittent constipation and diarrhea for the past week with migratory abdominal discomfort but mostly in the upper abdomen and radiates into the chest at times.  She also reports intermittent bilateral thoracic back pain without urinary symptoms, intermittent headaches.  Patient worried about inflammation in her bloodstream.  Recently finished a course of Bactrim after having an abscess on her left shoulder.  Has not taken anything for his symptoms today.  No fevers but feels warm at times.  No shortness of breath at this time.            Review of External Medical Records:     Nursing Notes were reviewed.    REVIEW OF SYSTEMS    (2-9 systems for level 4, 10 or more for level 5)     Review of Systems   Gastrointestinal:  Positive for abdominal pain.   Musculoskeletal:  Positive for back pain.   Neurological:  Positive for headaches.       Except as noted above the remainder of the review of systems was reviewed and negative.       PAST MEDICAL HISTORY     Past Medical History:   Diagnosis Date    Asthma     Second hand smoke exposure     Seizure (HCC)     mother unsure what kind of seizure, last one was in 1st grade         SURGICAL HISTORY     No past surgical history on file.      CURRENT MEDICATIONS       Previous Medications    RISPERIDONE ER (PERSERIS) 120 MG PRSY SUBCUTANEOUS INJECTION    Inject 0.8 mLs into the skin every 28 days Next

## 2024-09-30 NOTE — ED TRIAGE NOTES
Pt arrived ambulatory to the ER with CC \"feel like I've got a stomach bug\". +Headache, fluctuating diarrhea/constipation, lower back pain, lower abd pain, intermittent chest pain radiating from abd.     Was on abx for abscess on left shoulder - finished a week ago.

## 2025-04-01 ENCOUNTER — TRANSCRIBE ORDERS (OUTPATIENT)
Facility: HOSPITAL | Age: 30
End: 2025-04-01

## 2025-04-01 DIAGNOSIS — E22.1 HYPERPROLACTINEMIA: Primary | ICD-10-CM

## 2025-04-21 ENCOUNTER — HOSPITAL ENCOUNTER (EMERGENCY)
Facility: HOSPITAL | Age: 30
Discharge: HOME | End: 2025-04-21
Attending: STUDENT IN AN ORGANIZED HEALTH CARE EDUCATION/TRAINING PROGRAM
Payer: COMMERCIAL

## 2025-04-21 VITALS
OXYGEN SATURATION: 99 % | TEMPERATURE: 97.5 F | BODY MASS INDEX: 28.12 KG/M2 | SYSTOLIC BLOOD PRESSURE: 117 MMHG | DIASTOLIC BLOOD PRESSURE: 81 MMHG | RESPIRATION RATE: 18 BRPM | WEIGHT: 163.8 LBS | HEART RATE: 75 BPM

## 2025-04-21 DIAGNOSIS — A49.9 UTI (URINARY TRACT INFECTION), BACTERIAL: Primary | ICD-10-CM

## 2025-04-21 DIAGNOSIS — N39.0 UTI (URINARY TRACT INFECTION), BACTERIAL: Primary | ICD-10-CM

## 2025-04-21 LAB
APPEARANCE UR: ABNORMAL
BACTERIA URNS QL MICRO: ABNORMAL /HPF
BILIRUB UR QL: NEGATIVE
COLOR UR: ABNORMAL
EPITH CASTS URNS QL MICRO: ABNORMAL /LPF
GLUCOSE UR STRIP.AUTO-MCNC: NEGATIVE MG/DL
HCG UR QL: NEGATIVE
HGB UR QL STRIP: ABNORMAL
HYALINE CASTS URNS QL MICRO: ABNORMAL /LPF (ref 0–5)
KETONES UR QL STRIP.AUTO: NEGATIVE MG/DL
LEUKOCYTE ESTERASE UR QL STRIP.AUTO: ABNORMAL
NITRITE UR QL STRIP.AUTO: NEGATIVE
PH UR STRIP: 6.5 (ref 5–8)
PROT UR STRIP-MCNC: 30 MG/DL
RBC #/AREA URNS HPF: ABNORMAL /HPF (ref 0–5)
SP GR UR REFRACTOMETRY: 1.02 (ref 1–1.03)
SPECIMEN HOLD: NORMAL
UROBILINOGEN UR QL STRIP.AUTO: 0.2 EU/DL (ref 0.2–1)
WBC URNS QL MICRO: >100 /HPF (ref 0–4)

## 2025-04-21 PROCEDURE — 87088 URINE BACTERIA CULTURE: CPT

## 2025-04-21 PROCEDURE — 87186 SC STD MICRODIL/AGAR DIL: CPT

## 2025-04-21 PROCEDURE — 99283 EMERGENCY DEPT VISIT LOW MDM: CPT

## 2025-04-21 PROCEDURE — 81001 URINALYSIS AUTO W/SCOPE: CPT

## 2025-04-21 PROCEDURE — 87086 URINE CULTURE/COLONY COUNT: CPT

## 2025-04-21 PROCEDURE — 81025 URINE PREGNANCY TEST: CPT

## 2025-04-21 RX ORDER — PHENAZOPYRIDINE HYDROCHLORIDE 200 MG/1
200 TABLET, FILM COATED ORAL 3 TIMES DAILY PRN
Qty: 8 TABLET | Refills: 0 | Status: SHIPPED | OUTPATIENT
Start: 2025-04-21 | End: 2025-04-24

## 2025-04-21 RX ORDER — NITROFURANTOIN 25; 75 MG/1; MG/1
100 CAPSULE ORAL 2 TIMES DAILY
Qty: 14 CAPSULE | Refills: 0 | Status: SHIPPED | OUTPATIENT
Start: 2025-04-21 | End: 2025-04-28

## 2025-04-21 ASSESSMENT — ENCOUNTER SYMPTOMS
VOMITING: 0
BACK PAIN: 0
ABDOMINAL PAIN: 0
SHORTNESS OF BREATH: 0
COUGH: 0
TROUBLE SWALLOWING: 0
DIARRHEA: 0
NAUSEA: 0

## 2025-04-21 ASSESSMENT — PAIN - FUNCTIONAL ASSESSMENT: PAIN_FUNCTIONAL_ASSESSMENT: NONE - DENIES PAIN

## 2025-04-21 NOTE — ED TRIAGE NOTES
Patient presents from home with complaints of possible UTI. Reports she has dysuria and has not been good about drinking water

## 2025-04-21 NOTE — ED PROVIDER NOTES
[facility] EMERGENCY DEPARTMENT  EMERGENCY DEPARTMENT ENCOUNTER      Pt Name: Yen Edgar  MRN: [id number]  Birthdate 1995  Date of evaluation: 4/21/2025  Provider: YASH Rojas NP    CHIEF COMPLAINT       Chief Complaint   Patient presents with    Dysuria         HISTORY OF PRESENT ILLNESS   (Location/Symptom, Timing/Onset, Context/Setting, Quality, Duration, Modifying Factors, Severity)  Note limiting factors.   HPI patient is a 30-year-old female with past medical history significant for schizophrenia who presents to the ED reporting concerns that she may have a urinary tract infection.  She states she has been having dysuria and frequency for 1 to 2 days.  Denies fever, abdominal pain or back pain.  She has not had any medications today prior to arrival.  She drove herself here.  Old charts reviewed.      Review of External Medical Records:     Nursing Notes were reviewed.    REVIEW OF SYSTEMS    (2-9 systems for level 4, 10 or more for level 5)     Review of Systems   Constitutional:  Negative for activity change, appetite change, fever and unexpected weight change.   HENT:  Negative for congestion and trouble swallowing.    Respiratory:  Negative for cough and shortness of breath.    Cardiovascular:  Negative for chest pain, palpitations and leg swelling.   Gastrointestinal:  Negative for abdominal pain, diarrhea, nausea and vomiting.   Genitourinary:  Positive for dysuria and frequency. Negative for pelvic pain, vaginal bleeding and vaginal discharge.   Musculoskeletal:  Negative for back pain and gait problem.   Skin:  Negative for rash.   Neurological:  Negative for headaches.   All other systems reviewed and are negative.      Except as noted above the remainder of the review of systems was reviewed and negative.       PAST MEDICAL HISTORY     Past Medical History:   Diagnosis Date    Asthma     Second hand smoke exposure     Seizure (HCC)     mother unsure what kind of seizure, last one

## 2025-04-21 NOTE — DISCHARGE INSTR - COC
Continuity of Care Form    Patient Name: Yen Edgar   :  1995  MRN:  [id number]    Admit date:  (Not on file)  Discharge date:  ***    Code Status Order: Prior   Advance Directives:     Admitting Physician:  No admitting provider for patient encounter.  PCP: Toya Nunez NP-C    Discharging Nurse: ***  Discharging Hospital Unit/Room#: No information available for this encounter.  Discharging Unit Phone Number: ***    Emergency Contact:   Extended Emergency Contact Information  Primary Emergency Contact: Lyric Edgar  Address: [address] [place] [address]            [place] [address] [facility]  Home Phone: [phone]  Mobile Phone: [phone]  Relation: Parent    Past Surgical History:  No past surgical history on file.    Immunization History:     There is no immunization history on file for this patient.    Active Problems:  Patient Active Problem List   Diagnosis Code    Disorganized schizophrenia (HCC) F20.1       Isolation/Infection:   Isolation            No Isolation          Patient Infection Status    None to display              Nurse Assessment:  Last Vital Signs: /81   Pulse 75   Temp 97.5 °F (36.4 °C) (Oral)   Resp 18   Wt 74.3 kg (163 lb 12.8 oz)   SpO2 99%   BMI 28.12 kg/m²     Last documented pain score (0-10 scale):    Last Weight:   Wt Readings from Last 1 Encounters:   25 74.3 kg (163 lb 12.8 oz)     Mental Status:  {IP PT MENTAL STATUS:92822}    IV Access:  { MERRILL IV ACCESS:775446842}    Nursing Mobility/ADLs:  Walking   {CHP DME ADLs:784688435}  Transfer  {CHP DME ADLs:354943525}  Bathing  {CHP DME ADLs:257429931}  Dressing  {CHP DME ADLs:764153506}  Toileting  {CHP DME ADLs:279398475}  Feeding  {CHP DME ADLs:493752273}  Med Admin  {CHP DME ADLs:096457876}  Med Delivery   { MERRILL MED Delivery:104280090}    Wound Care Documentation and Therapy:        Elimination:  Continence:   Bowel: {YES / NO:}  Bladder: {YES / NO:}  Urinary Catheter: {Urinary

## 2025-04-22 ENCOUNTER — RESULTS FOLLOW-UP (OUTPATIENT)
Facility: HOSPITAL | Age: 30
End: 2025-04-22

## 2025-04-23 LAB
BACTERIA SPEC CULT: ABNORMAL
CC UR VC: ABNORMAL
SERVICE CMNT-IMP: ABNORMAL

## 2025-06-26 NOTE — PROGRESS NOTES
Yen remained in her room during the evening and did not socialize. She was pleasant when approached. She denied SI, HI, AVH and pain. She was med compliant. She requested no PRNs. She appears to be sleeping well.   Equal and normal pulses (carotid, femoral, dorsalis pedis)

## 2025-08-21 ENCOUNTER — TRANSCRIBE ORDERS (OUTPATIENT)
Facility: HOSPITAL | Age: 30
End: 2025-08-21

## 2025-08-21 DIAGNOSIS — E22.1 HYPERPROLACTINEMIA: Primary | ICD-10-CM
